# Patient Record
Sex: FEMALE | Race: WHITE | NOT HISPANIC OR LATINO | Employment: FULL TIME | URBAN - METROPOLITAN AREA
[De-identification: names, ages, dates, MRNs, and addresses within clinical notes are randomized per-mention and may not be internally consistent; named-entity substitution may affect disease eponyms.]

---

## 2017-07-03 ENCOUNTER — HOSPITAL ENCOUNTER (OUTPATIENT)
Dept: RADIOLOGY | Facility: OTHER | Age: 57
Discharge: HOME | End: 2017-07-03
Attending: OBSTETRICS & GYNECOLOGY
Payer: COMMERCIAL

## 2017-07-03 VITALS — WEIGHT: 114 LBS | BODY MASS INDEX: 19.46 KG/M2 | HEIGHT: 64 IN

## 2017-07-03 DIAGNOSIS — R92.8 ABNORMAL MAMMOGRAM, UNSPECIFIED: ICD-10-CM

## 2017-07-03 PROCEDURE — 77066 DX MAMMO INCL CAD BI: CPT | Mod: TC

## 2017-07-03 PROCEDURE — 77066 DX MAMMO INCL CAD BI: CPT | Mod: 26,,, | Performed by: RADIOLOGY

## 2017-07-03 PROCEDURE — 77062 BREAST TOMOSYNTHESIS BI: CPT | Mod: 26,,, | Performed by: RADIOLOGY

## 2018-03-13 ENCOUNTER — HOSPITAL ENCOUNTER (OUTPATIENT)
Dept: RADIOLOGY | Facility: OTHER | Age: 58
Discharge: HOME | End: 2018-03-13
Attending: INTERNAL MEDICINE
Payer: COMMERCIAL

## 2018-03-13 DIAGNOSIS — M25.572 LEFT ANKLE PAIN: ICD-10-CM

## 2018-03-13 PROCEDURE — 73600 X-RAY EXAM OF ANKLE: CPT | Mod: 26,50,, | Performed by: RADIOLOGY

## 2018-03-13 PROCEDURE — 73600 X-RAY EXAM OF ANKLE: CPT | Mod: 50,TC,FY

## 2018-09-28 DIAGNOSIS — Z12.39 SCREENING BREAST EXAMINATION: Primary | ICD-10-CM

## 2018-10-09 ENCOUNTER — HOSPITAL ENCOUNTER (OUTPATIENT)
Dept: RADIOLOGY | Facility: OTHER | Age: 58
Discharge: HOME | End: 2018-10-09
Attending: OBSTETRICS & GYNECOLOGY
Payer: COMMERCIAL

## 2018-10-09 VITALS — WEIGHT: 114 LBS | HEIGHT: 64 IN | BODY MASS INDEX: 19.46 KG/M2

## 2018-10-09 DIAGNOSIS — Z12.39 SCREENING BREAST EXAMINATION: ICD-10-CM

## 2018-10-09 PROCEDURE — 77063 BREAST TOMOSYNTHESIS BI: CPT | Mod: 26,,, | Performed by: RADIOLOGY

## 2018-10-09 PROCEDURE — 77067 SCR MAMMO BI INCL CAD: CPT | Mod: TC

## 2018-10-09 PROCEDURE — 77067 SCR MAMMO BI INCL CAD: CPT | Mod: 26,,, | Performed by: RADIOLOGY

## 2018-10-09 PROCEDURE — 77063 BREAST TOMOSYNTHESIS BI: CPT | Mod: TC

## 2021-03-12 ENCOUNTER — LAB VISIT (OUTPATIENT)
Dept: INTERNAL MEDICINE | Facility: CLINIC | Age: 61
End: 2021-03-12
Payer: COMMERCIAL

## 2021-03-12 DIAGNOSIS — Z01.818 PRE-OP TESTING: ICD-10-CM

## 2021-03-12 LAB — SARS-COV-2 RNA RESP QL NAA+PROBE: NOT DETECTED

## 2021-03-12 PROCEDURE — U0005 INFEC AGEN DETEC AMPLI PROBE: HCPCS | Performed by: SPECIALIST

## 2021-03-12 PROCEDURE — U0003 INFECTIOUS AGENT DETECTION BY NUCLEIC ACID (DNA OR RNA); SEVERE ACUTE RESPIRATORY SYNDROME CORONAVIRUS 2 (SARS-COV-2) (CORONAVIRUS DISEASE [COVID-19]), AMPLIFIED PROBE TECHNIQUE, MAKING USE OF HIGH THROUGHPUT TECHNOLOGIES AS DESCRIBED BY CMS-2020-01-R: HCPCS | Performed by: SPECIALIST

## 2021-03-15 ENCOUNTER — ANESTHESIA (OUTPATIENT)
Dept: SURGERY | Facility: OTHER | Age: 61
End: 2021-03-15
Payer: COMMERCIAL

## 2021-03-15 ENCOUNTER — ANESTHESIA EVENT (OUTPATIENT)
Dept: SURGERY | Facility: OTHER | Age: 61
End: 2021-03-15
Payer: COMMERCIAL

## 2021-03-15 ENCOUNTER — HOSPITAL ENCOUNTER (OUTPATIENT)
Facility: OTHER | Age: 61
Discharge: HOME | End: 2021-03-15
Attending: ORTHOPAEDIC SURGERY | Admitting: ORTHOPAEDIC SURGERY
Payer: COMMERCIAL

## 2021-03-15 VITALS
OXYGEN SATURATION: 100 % | SYSTOLIC BLOOD PRESSURE: 137 MMHG | WEIGHT: 118 LBS | HEART RATE: 61 BPM | BODY MASS INDEX: 19.66 KG/M2 | DIASTOLIC BLOOD PRESSURE: 66 MMHG | HEIGHT: 65 IN | TEMPERATURE: 97 F | RESPIRATION RATE: 18 BRPM

## 2021-03-15 DIAGNOSIS — M65.311 TRIGGER THUMB OF RIGHT HAND: Primary | ICD-10-CM

## 2021-03-15 DIAGNOSIS — Z01.818 PRE-OP TESTING: ICD-10-CM

## 2021-03-15 PROCEDURE — 36000706: Performed by: ORTHOPAEDIC SURGERY

## 2021-03-15 PROCEDURE — 36000707: Performed by: ORTHOPAEDIC SURGERY

## 2021-03-15 PROCEDURE — 63600175 PHARM REV CODE 636 W HCPCS: Performed by: NURSE ANESTHETIST, CERTIFIED REGISTERED

## 2021-03-15 PROCEDURE — 25000003 PHARM REV CODE 250: Performed by: NURSE ANESTHETIST, CERTIFIED REGISTERED

## 2021-03-15 PROCEDURE — 37000008 HC ANESTHESIA 1ST 15 MINUTES: Performed by: ORTHOPAEDIC SURGERY

## 2021-03-15 PROCEDURE — 71000016 HC POSTOP RECOV ADDL HR: Performed by: ORTHOPAEDIC SURGERY

## 2021-03-15 PROCEDURE — 25000003 PHARM REV CODE 250: Performed by: ORTHOPAEDIC SURGERY

## 2021-03-15 PROCEDURE — 71000015 HC POSTOP RECOV 1ST HR: Performed by: ORTHOPAEDIC SURGERY

## 2021-03-15 PROCEDURE — 63600175 PHARM REV CODE 636 W HCPCS: Performed by: ORTHOPAEDIC SURGERY

## 2021-03-15 PROCEDURE — 37000009 HC ANESTHESIA EA ADD 15 MINS: Performed by: ORTHOPAEDIC SURGERY

## 2021-03-15 RX ORDER — NAPROXEN SODIUM 220 MG
220 TABLET ORAL 2 TIMES DAILY WITH MEALS
Status: ON HOLD | COMMUNITY
Start: 2021-03-15 | End: 2022-12-28 | Stop reason: HOSPADM

## 2021-03-15 RX ORDER — OXYCODONE HYDROCHLORIDE 5 MG/1
5 TABLET ORAL
Status: CANCELLED | OUTPATIENT
Start: 2021-03-15

## 2021-03-15 RX ORDER — CEFAZOLIN SODIUM 1 G/3ML
2 INJECTION, POWDER, FOR SOLUTION INTRAMUSCULAR; INTRAVENOUS
Status: COMPLETED | OUTPATIENT
Start: 2021-03-15 | End: 2021-03-15

## 2021-03-15 RX ORDER — MIDAZOLAM HYDROCHLORIDE 1 MG/ML
INJECTION INTRAMUSCULAR; INTRAVENOUS
Status: DISCONTINUED | OUTPATIENT
Start: 2021-03-15 | End: 2021-03-15

## 2021-03-15 RX ORDER — HYDROMORPHONE HYDROCHLORIDE 2 MG/ML
0.4 INJECTION, SOLUTION INTRAMUSCULAR; INTRAVENOUS; SUBCUTANEOUS EVERY 5 MIN PRN
Status: CANCELLED | OUTPATIENT
Start: 2021-03-15

## 2021-03-15 RX ORDER — DIPHENHYDRAMINE HYDROCHLORIDE 50 MG/ML
25 INJECTION INTRAMUSCULAR; INTRAVENOUS EVERY 6 HOURS PRN
Status: CANCELLED | OUTPATIENT
Start: 2021-03-15

## 2021-03-15 RX ORDER — PROPOFOL 10 MG/ML
VIAL (ML) INTRAVENOUS
Status: DISCONTINUED | OUTPATIENT
Start: 2021-03-15 | End: 2021-03-15

## 2021-03-15 RX ORDER — LIDOCAINE HYDROCHLORIDE 10 MG/ML
INJECTION, SOLUTION EPIDURAL; INFILTRATION; INTRACAUDAL; PERINEURAL
Status: DISCONTINUED | OUTPATIENT
Start: 2021-03-15 | End: 2021-03-15 | Stop reason: HOSPADM

## 2021-03-15 RX ORDER — ONDANSETRON 2 MG/ML
4 INJECTION INTRAMUSCULAR; INTRAVENOUS DAILY PRN
Status: CANCELLED | OUTPATIENT
Start: 2021-03-15

## 2021-03-15 RX ORDER — MEPERIDINE HYDROCHLORIDE 25 MG/ML
12.5 INJECTION INTRAMUSCULAR; INTRAVENOUS; SUBCUTANEOUS ONCE AS NEEDED
Status: CANCELLED | OUTPATIENT
Start: 2021-03-15 | End: 2021-03-16

## 2021-03-15 RX ORDER — SODIUM CHLORIDE 0.9 % (FLUSH) 0.9 %
3 SYRINGE (ML) INJECTION
Status: DISCONTINUED | OUTPATIENT
Start: 2021-03-15 | End: 2021-03-15 | Stop reason: HOSPADM

## 2021-03-15 RX ORDER — LIDOCAINE HYDROCHLORIDE 20 MG/ML
INJECTION INTRAVENOUS
Status: DISCONTINUED | OUTPATIENT
Start: 2021-03-15 | End: 2021-03-15

## 2021-03-15 RX ORDER — DEXTROMETHORPHAN HYDROBROMIDE, GUAIFENESIN 5; 100 MG/5ML; MG/5ML
650 LIQUID ORAL EVERY 8 HOURS
Qty: 60 TABLET | Refills: 0 | Status: ON HOLD | OUTPATIENT
Start: 2021-03-15 | End: 2022-12-27

## 2021-03-15 RX ORDER — FENTANYL CITRATE 50 UG/ML
INJECTION, SOLUTION INTRAMUSCULAR; INTRAVENOUS
Status: DISCONTINUED | OUTPATIENT
Start: 2021-03-15 | End: 2021-03-15

## 2021-03-15 RX ORDER — PROPOFOL 10 MG/ML
VIAL (ML) INTRAVENOUS CONTINUOUS PRN
Status: DISCONTINUED | OUTPATIENT
Start: 2021-03-15 | End: 2021-03-15

## 2021-03-15 RX ORDER — OXYCODONE HYDROCHLORIDE 5 MG/1
TABLET ORAL
Qty: 10 TABLET | Refills: 0 | Status: ON HOLD | OUTPATIENT
Start: 2021-03-15 | End: 2022-12-27

## 2021-03-15 RX ADMIN — CEFAZOLIN 2 G: 330 INJECTION, POWDER, FOR SOLUTION INTRAMUSCULAR; INTRAVENOUS at 07:03

## 2021-03-15 RX ADMIN — PROPOFOL 40 MG: 10 INJECTION, EMULSION INTRAVENOUS at 07:03

## 2021-03-15 RX ADMIN — LIDOCAINE HYDROCHLORIDE 40 MG: 20 INJECTION, SOLUTION INTRAVENOUS at 07:03

## 2021-03-15 RX ADMIN — PROPOFOL 30 MG: 10 INJECTION, EMULSION INTRAVENOUS at 08:03

## 2021-03-15 RX ADMIN — FENTANYL CITRATE 25 MCG: 50 INJECTION, SOLUTION INTRAMUSCULAR; INTRAVENOUS at 08:03

## 2021-03-15 RX ADMIN — HYDROCORTISONE SODIUM SUCCINATE 100 MG: 100 INJECTION, POWDER, FOR SOLUTION INTRAMUSCULAR; INTRAVENOUS at 07:03

## 2021-03-15 RX ADMIN — MIDAZOLAM HYDROCHLORIDE 2 MG: 1 INJECTION, SOLUTION INTRAMUSCULAR; INTRAVENOUS at 07:03

## 2021-03-15 RX ADMIN — PROPOFOL 50 MCG/KG/MIN: 10 INJECTION, EMULSION INTRAVENOUS at 07:03

## 2021-03-29 ENCOUNTER — IMMUNIZATION (OUTPATIENT)
Dept: PRIMARY CARE CLINIC | Facility: CLINIC | Age: 61
End: 2021-03-29
Payer: COMMERCIAL

## 2021-03-29 DIAGNOSIS — Z23 NEED FOR VACCINATION: Primary | ICD-10-CM

## 2021-03-29 PROCEDURE — 91301 PR SARS-COV-2 COVID-19 VACCINE, NO PRSV, 100MCG/0.5ML, IM: ICD-10-PCS | Mod: S$GLB,,, | Performed by: INTERNAL MEDICINE

## 2021-03-29 PROCEDURE — 0011A PR IMMUNIZ ADMIN, SARS-COV-2 COVID-19 VACC, 100MCG/0.5ML, 1ST DOSE: ICD-10-PCS | Mod: CV19,S$GLB,, | Performed by: INTERNAL MEDICINE

## 2021-03-29 PROCEDURE — 91301 PR SARS-COV-2 COVID-19 VACCINE, NO PRSV, 100MCG/0.5ML, IM: CPT | Mod: S$GLB,,, | Performed by: INTERNAL MEDICINE

## 2021-03-29 PROCEDURE — 0011A PR IMMUNIZ ADMIN, SARS-COV-2 COVID-19 VACC, 100MCG/0.5ML, 1ST DOSE: CPT | Mod: CV19,S$GLB,, | Performed by: INTERNAL MEDICINE

## 2021-03-29 RX ADMIN — Medication 0.5 ML: at 05:03

## 2021-04-28 ENCOUNTER — IMMUNIZATION (OUTPATIENT)
Dept: PRIMARY CARE CLINIC | Facility: CLINIC | Age: 61
End: 2021-04-28
Payer: COMMERCIAL

## 2021-04-28 DIAGNOSIS — Z23 NEED FOR VACCINATION: Primary | ICD-10-CM

## 2021-04-28 PROCEDURE — 0012A PR IMMUNIZ ADMIN, SARS-COV-2 COVID-19 VACC, 100MCG/0.5ML, 2ND DOSE: ICD-10-PCS | Mod: CV19,S$GLB,, | Performed by: INTERNAL MEDICINE

## 2021-04-28 PROCEDURE — 91301 PR SARS-COV-2 COVID-19 VACCINE, NO PRSV, 100MCG/0.5ML, IM: CPT | Mod: S$GLB,,, | Performed by: INTERNAL MEDICINE

## 2021-04-28 PROCEDURE — 91301 PR SARS-COV-2 COVID-19 VACCINE, NO PRSV, 100MCG/0.5ML, IM: ICD-10-PCS | Mod: S$GLB,,, | Performed by: INTERNAL MEDICINE

## 2021-04-28 PROCEDURE — 0012A PR IMMUNIZ ADMIN, SARS-COV-2 COVID-19 VACC, 100MCG/0.5ML, 2ND DOSE: CPT | Mod: CV19,S$GLB,, | Performed by: INTERNAL MEDICINE

## 2021-04-28 RX ADMIN — Medication 0.5 ML: at 07:04

## 2022-01-02 ENCOUNTER — IMMUNIZATION (OUTPATIENT)
Dept: PRIMARY CARE CLINIC | Facility: CLINIC | Age: 62
End: 2022-01-02
Payer: COMMERCIAL

## 2022-01-02 DIAGNOSIS — Z23 NEED FOR VACCINATION: Primary | ICD-10-CM

## 2022-01-02 PROCEDURE — 0064A COVID-19, MRNA, LNP-S, PF, 100 MCG/0.25 ML DOSE VACCINE (MODERNA BOOSTER): CPT | Mod: CV19,PBBFAC | Performed by: INTERNAL MEDICINE

## 2022-12-25 ENCOUNTER — HOSPITAL ENCOUNTER (INPATIENT)
Facility: HOSPITAL | Age: 62
LOS: 3 days | Discharge: HOME | DRG: 270 | End: 2022-12-28
Attending: EMERGENCY MEDICINE | Admitting: INTERNAL MEDICINE
Payer: COMMERCIAL

## 2022-12-25 DIAGNOSIS — R79.89 ELEVATED TROPONIN: ICD-10-CM

## 2022-12-25 DIAGNOSIS — I50.20 HFREF (HEART FAILURE WITH REDUCED EJECTION FRACTION): ICD-10-CM

## 2022-12-25 DIAGNOSIS — I21.3 STEMI (ST ELEVATION MYOCARDIAL INFARCTION): ICD-10-CM

## 2022-12-25 DIAGNOSIS — R07.9 CHEST PAIN: ICD-10-CM

## 2022-12-25 DIAGNOSIS — I21.3 ST ELEVATION MYOCARDIAL INFARCTION (STEMI), UNSPECIFIED ARTERY: Primary | ICD-10-CM

## 2022-12-25 DIAGNOSIS — I21.11 ST ELEVATION MYOCARDIAL INFARCTION INVOLVING RIGHT CORONARY ARTERY: ICD-10-CM

## 2022-12-25 PROBLEM — Z98.890 ON INTRA-AORTIC BALLOON PUMP ASSIST: Status: ACTIVE | Noted: 2022-12-25

## 2022-12-25 LAB
ABO + RH BLD: NORMAL
ALBUMIN SERPL BCP-MCNC: 3.8 G/DL (ref 3.5–5.2)
ALP SERPL-CCNC: 66 U/L (ref 55–135)
ALT SERPL W/O P-5'-P-CCNC: 21 U/L (ref 10–44)
ANION GAP SERPL CALC-SCNC: 11 MMOL/L (ref 8–16)
AST SERPL-CCNC: 44 U/L (ref 10–40)
BASOPHILS # BLD AUTO: 0.05 K/UL (ref 0–0.2)
BASOPHILS NFR BLD: 0.5 % (ref 0–1.9)
BILIRUB SERPL-MCNC: 0.7 MG/DL (ref 0.1–1)
BLD GP AB SCN CELLS X3 SERPL QL: NORMAL
BUN SERPL-MCNC: 19 MG/DL (ref 8–23)
CALCIUM SERPL-MCNC: 9.6 MG/DL (ref 8.7–10.5)
CHLORIDE SERPL-SCNC: 103 MMOL/L (ref 95–110)
CO2 SERPL-SCNC: 23 MMOL/L (ref 23–29)
CREAT SERPL-MCNC: 1 MG/DL (ref 0.5–1.4)
DIFFERENTIAL METHOD: ABNORMAL
EOSINOPHIL # BLD AUTO: 0.1 K/UL (ref 0–0.5)
EOSINOPHIL NFR BLD: 1 % (ref 0–8)
ERYTHROCYTE [DISTWIDTH] IN BLOOD BY AUTOMATED COUNT: 11.8 % (ref 11.5–14.5)
EST. GFR  (NO RACE VARIABLE): >60 ML/MIN/1.73 M^2
GLUCOSE SERPL-MCNC: 181 MG/DL (ref 70–110)
HCT VFR BLD AUTO: 45.7 % (ref 37–48.5)
HGB BLD-MCNC: 15.7 G/DL (ref 12–16)
IMM GRANULOCYTES # BLD AUTO: 0.02 K/UL (ref 0–0.04)
IMM GRANULOCYTES NFR BLD AUTO: 0.2 % (ref 0–0.5)
LYMPHOCYTES # BLD AUTO: 2.7 K/UL (ref 1–4.8)
LYMPHOCYTES NFR BLD: 29.6 % (ref 18–48)
MCH RBC QN AUTO: 33.3 PG (ref 27–31)
MCHC RBC AUTO-ENTMCNC: 34.4 G/DL (ref 32–36)
MCV RBC AUTO: 97 FL (ref 82–98)
MONOCYTES # BLD AUTO: 0.6 K/UL (ref 0.3–1)
MONOCYTES NFR BLD: 6.4 % (ref 4–15)
NEUTROPHILS # BLD AUTO: 5.7 K/UL (ref 1.8–7.7)
NEUTROPHILS NFR BLD: 62.3 % (ref 38–73)
NRBC BLD-RTO: 0 /100 WBC
PLATELET # BLD AUTO: 156 K/UL (ref 150–450)
PMV BLD AUTO: 10.4 FL (ref 9.2–12.9)
POCT GLUCOSE: 107 MG/DL (ref 70–110)
POCT GLUCOSE: 93 MG/DL (ref 70–110)
POTASSIUM SERPL-SCNC: 4.3 MMOL/L (ref 3.5–5.1)
PROT SERPL-MCNC: 6.4 G/DL (ref 6–8.4)
RBC # BLD AUTO: 4.71 M/UL (ref 4–5.4)
SODIUM SERPL-SCNC: 137 MMOL/L (ref 136–145)
TROPONIN I SERPL DL<=0.01 NG/ML-MCNC: 1.73 NG/ML (ref 0–0.03)
TROPONIN I SERPL DL<=0.01 NG/ML-MCNC: 14.59 NG/ML (ref 0–0.03)
TROPONIN I SERPL DL<=0.01 NG/ML-MCNC: >50 NG/ML (ref 0–0.03)
WBC # BLD AUTO: 9.22 K/UL (ref 3.9–12.7)

## 2022-12-25 PROCEDURE — 84484 ASSAY OF TROPONIN QUANT: CPT | Performed by: STUDENT IN AN ORGANIZED HEALTH CARE EDUCATION/TRAINING PROGRAM

## 2022-12-25 PROCEDURE — 99152 MOD SED SAME PHYS/QHP 5/>YRS: CPT | Mod: ,,, | Performed by: INTERNAL MEDICINE

## 2022-12-25 PROCEDURE — 93308 PR ECHO HEART XTHORACIC,LIMITED: ICD-10-PCS | Mod: 26,,, | Performed by: EMERGENCY MEDICINE

## 2022-12-25 PROCEDURE — 99285 EMERGENCY DEPT VISIT HI MDM: CPT | Mod: 25,,, | Performed by: EMERGENCY MEDICINE

## 2022-12-25 PROCEDURE — 33967 INSERT I-AORT PERCUT DEVICE: CPT | Performed by: INTERNAL MEDICINE

## 2022-12-25 PROCEDURE — 99152 PR MOD CONSCIOUS SEDATION, SAME PHYS, 5+ YRS, FIRST 15 MIN: ICD-10-PCS | Mod: ,,, | Performed by: INTERNAL MEDICINE

## 2022-12-25 PROCEDURE — 99291 PR CRITICAL CARE, E/M 30-74 MINUTES: ICD-10-PCS | Mod: ,,, | Performed by: INTERNAL MEDICINE

## 2022-12-25 PROCEDURE — 96374 THER/PROPH/DIAG INJ IV PUSH: CPT

## 2022-12-25 PROCEDURE — C1894 INTRO/SHEATH, NON-LASER: HCPCS | Performed by: INTERNAL MEDICINE

## 2022-12-25 PROCEDURE — 93010 ELECTROCARDIOGRAM REPORT: CPT | Mod: 76,,, | Performed by: INTERNAL MEDICINE

## 2022-12-25 PROCEDURE — 27201423 OPTIME MED/SURG SUP & DEVICES STERILE SUPPLY: Performed by: INTERNAL MEDICINE

## 2022-12-25 PROCEDURE — 63600175 PHARM REV CODE 636 W HCPCS: Mod: JG | Performed by: STUDENT IN AN ORGANIZED HEALTH CARE EDUCATION/TRAINING PROGRAM

## 2022-12-25 PROCEDURE — 51701 INSERT BLADDER CATHETER: CPT

## 2022-12-25 PROCEDURE — 25000003 PHARM REV CODE 250: Performed by: STUDENT IN AN ORGANIZED HEALTH CARE EDUCATION/TRAINING PROGRAM

## 2022-12-25 PROCEDURE — C9606 PERC D-E COR REVASC W AMI S: HCPCS | Mod: RC | Performed by: INTERNAL MEDICINE

## 2022-12-25 PROCEDURE — 93454 PR CATH PLACE/CORONARY ANGIO, IMG SUPER/INTERP: ICD-10-PCS | Mod: 26,59,51, | Performed by: INTERNAL MEDICINE

## 2022-12-25 PROCEDURE — 92941 PRQ TRLML REVSC TOT OCCL AMI: CPT | Mod: RC,,, | Performed by: INTERNAL MEDICINE

## 2022-12-25 PROCEDURE — 99285 PR EMERGENCY DEPT VISIT,LEVEL V: ICD-10-PCS | Mod: 25,,, | Performed by: EMERGENCY MEDICINE

## 2022-12-25 PROCEDURE — 25000003 PHARM REV CODE 250

## 2022-12-25 PROCEDURE — 99153 MOD SED SAME PHYS/QHP EA: CPT | Performed by: INTERNAL MEDICINE

## 2022-12-25 PROCEDURE — 33967 PR IABP INSERTION: ICD-10-PCS | Mod: 51,,, | Performed by: INTERNAL MEDICINE

## 2022-12-25 PROCEDURE — 86900 BLOOD TYPING SEROLOGIC ABO: CPT | Performed by: EMERGENCY MEDICINE

## 2022-12-25 PROCEDURE — 25500020 PHARM REV CODE 255: Performed by: INTERNAL MEDICINE

## 2022-12-25 PROCEDURE — C1769 GUIDE WIRE: HCPCS | Performed by: INTERNAL MEDICINE

## 2022-12-25 PROCEDURE — 33967 INSERT I-AORT PERCUT DEVICE: CPT | Mod: 51,,, | Performed by: INTERNAL MEDICINE

## 2022-12-25 PROCEDURE — 93005 ELECTROCARDIOGRAM TRACING: CPT

## 2022-12-25 PROCEDURE — C1760 CLOSURE DEV, VASC: HCPCS | Performed by: INTERNAL MEDICINE

## 2022-12-25 PROCEDURE — 99291 CRITICAL CARE FIRST HOUR: CPT | Mod: ,,, | Performed by: INTERNAL MEDICINE

## 2022-12-25 PROCEDURE — 93454 CORONARY ARTERY ANGIO S&I: CPT | Mod: 26,59,51, | Performed by: INTERNAL MEDICINE

## 2022-12-25 PROCEDURE — 92941 PR AMI ANY METHOD: ICD-10-PCS | Mod: RC,,, | Performed by: INTERNAL MEDICINE

## 2022-12-25 PROCEDURE — 93010 EKG 12-LEAD: ICD-10-PCS | Mod: ,,, | Performed by: INTERNAL MEDICINE

## 2022-12-25 PROCEDURE — 27100094 HC IABP, SET-UP

## 2022-12-25 PROCEDURE — 25000003 PHARM REV CODE 250: Performed by: INTERNAL MEDICINE

## 2022-12-25 PROCEDURE — 84484 ASSAY OF TROPONIN QUANT: CPT | Mod: 91 | Performed by: EMERGENCY MEDICINE

## 2022-12-25 PROCEDURE — 63600175 PHARM REV CODE 636 W HCPCS: Performed by: INTERNAL MEDICINE

## 2022-12-25 PROCEDURE — 20000000 HC ICU ROOM

## 2022-12-25 PROCEDURE — 99285 EMERGENCY DEPT VISIT HI MDM: CPT | Mod: 25

## 2022-12-25 PROCEDURE — 93010 EKG 12-LEAD: ICD-10-PCS | Mod: 76,,, | Performed by: INTERNAL MEDICINE

## 2022-12-25 PROCEDURE — C1725 CATH, TRANSLUMIN NON-LASER: HCPCS | Performed by: INTERNAL MEDICINE

## 2022-12-25 PROCEDURE — C2623 CATH, TRANSLUMIN, DRUG-COAT: HCPCS | Performed by: INTERNAL MEDICINE

## 2022-12-25 PROCEDURE — 93454 CORONARY ARTERY ANGIO S&I: CPT | Mod: 59 | Performed by: INTERNAL MEDICINE

## 2022-12-25 PROCEDURE — 51798 US URINE CAPACITY MEASURE: CPT

## 2022-12-25 PROCEDURE — 99499 UNLISTED E&M SERVICE: CPT | Mod: ,,, | Performed by: INTERNAL MEDICINE

## 2022-12-25 PROCEDURE — 25000003 PHARM REV CODE 250: Performed by: EMERGENCY MEDICINE

## 2022-12-25 PROCEDURE — 81003 URINALYSIS AUTO W/O SCOPE: CPT | Performed by: EMERGENCY MEDICINE

## 2022-12-25 PROCEDURE — 99499 NO LOS: ICD-10-PCS | Mod: ,,, | Performed by: INTERNAL MEDICINE

## 2022-12-25 PROCEDURE — 93010 ELECTROCARDIOGRAM REPORT: CPT | Mod: ,,, | Performed by: INTERNAL MEDICINE

## 2022-12-25 PROCEDURE — 99152 MOD SED SAME PHYS/QHP 5/>YRS: CPT | Performed by: INTERNAL MEDICINE

## 2022-12-25 PROCEDURE — 93308 TTE F-UP OR LMTD: CPT | Mod: 26,,, | Performed by: EMERGENCY MEDICINE

## 2022-12-25 PROCEDURE — 80053 COMPREHEN METABOLIC PANEL: CPT | Performed by: EMERGENCY MEDICINE

## 2022-12-25 PROCEDURE — 85025 COMPLETE CBC W/AUTO DIFF WBC: CPT | Performed by: EMERGENCY MEDICINE

## 2022-12-25 PROCEDURE — C1887 CATHETER, GUIDING: HCPCS | Performed by: INTERNAL MEDICINE

## 2022-12-25 PROCEDURE — 63600175 PHARM REV CODE 636 W HCPCS: Performed by: EMERGENCY MEDICINE

## 2022-12-25 DEVICE — STENT ONYXNG40018UX ONYX 4.00X18RX
Type: IMPLANTABLE DEVICE | Site: HEART | Status: FUNCTIONAL
Brand: ONYX FRONTIER™

## 2022-12-25 DEVICE — STENT ONYXNG35008UX ONYX 3.50X08RX
Type: IMPLANTABLE DEVICE | Site: HEART | Status: FUNCTIONAL
Brand: ONYX FRONTIER™

## 2022-12-25 RX ORDER — IBUPROFEN 200 MG
16 TABLET ORAL
Status: DISCONTINUED | OUTPATIENT
Start: 2022-12-25 | End: 2022-12-28 | Stop reason: HOSPADM

## 2022-12-25 RX ORDER — TIROFIBAN HYDROCHLORIDE 50 UG/ML
0.07 INJECTION INTRAVENOUS CONTINUOUS
Status: DISPENSED | OUTPATIENT
Start: 2022-12-25 | End: 2022-12-26

## 2022-12-25 RX ORDER — CEFAZOLIN SODIUM 1 G/3ML
INJECTION, POWDER, FOR SOLUTION INTRAMUSCULAR; INTRAVENOUS
Status: DISCONTINUED | OUTPATIENT
Start: 2022-12-25 | End: 2022-12-25 | Stop reason: HOSPADM

## 2022-12-25 RX ORDER — SODIUM CHLORIDE 9 MG/ML
INJECTION, SOLUTION INTRAVENOUS CONTINUOUS
Status: CANCELLED | OUTPATIENT
Start: 2022-12-25 | End: 2022-12-25

## 2022-12-25 RX ORDER — LIDOCAINE HYDROCHLORIDE 10 MG/ML
INJECTION INFILTRATION; PERINEURAL
Status: DISCONTINUED | OUTPATIENT
Start: 2022-12-25 | End: 2022-12-25 | Stop reason: HOSPADM

## 2022-12-25 RX ORDER — MIDAZOLAM HYDROCHLORIDE 1 MG/ML
INJECTION INTRAMUSCULAR; INTRAVENOUS
Status: DISCONTINUED | OUTPATIENT
Start: 2022-12-25 | End: 2022-12-25 | Stop reason: HOSPADM

## 2022-12-25 RX ORDER — IBUPROFEN 200 MG
24 TABLET ORAL
Status: DISCONTINUED | OUTPATIENT
Start: 2022-12-25 | End: 2022-12-28 | Stop reason: HOSPADM

## 2022-12-25 RX ORDER — HEPARIN SODIUM 1000 [USP'U]/ML
INJECTION, SOLUTION INTRAVENOUS; SUBCUTANEOUS
Status: DISCONTINUED | OUTPATIENT
Start: 2022-12-25 | End: 2022-12-25 | Stop reason: HOSPADM

## 2022-12-25 RX ORDER — GLUCAGON 1 MG
1 KIT INJECTION
Status: DISCONTINUED | OUTPATIENT
Start: 2022-12-25 | End: 2022-12-28 | Stop reason: HOSPADM

## 2022-12-25 RX ORDER — HEPARIN SODIUM 5000 [USP'U]/ML
5000 INJECTION, SOLUTION INTRAVENOUS; SUBCUTANEOUS ONCE
Status: COMPLETED | OUTPATIENT
Start: 2022-12-25 | End: 2022-12-25

## 2022-12-25 RX ORDER — ASPIRIN 325 MG
TABLET ORAL
Status: DISPENSED
Start: 2022-12-25 | End: 2022-12-25

## 2022-12-25 RX ORDER — NITROGLYCERIN 0.4 MG/1
TABLET SUBLINGUAL
Status: DISPENSED
Start: 2022-12-25 | End: 2022-12-25

## 2022-12-25 RX ORDER — ATORVASTATIN CALCIUM 20 MG/1
80 TABLET, FILM COATED ORAL DAILY
Status: DISCONTINUED | OUTPATIENT
Start: 2022-12-25 | End: 2022-12-28 | Stop reason: HOSPADM

## 2022-12-25 RX ORDER — DIPHENHYDRAMINE HCL 50 MG
CAPSULE ORAL
Status: DISCONTINUED
Start: 2022-12-25 | End: 2022-12-25 | Stop reason: WASHOUT

## 2022-12-25 RX ORDER — ACETAMINOPHEN 325 MG/1
650 TABLET ORAL EVERY 6 HOURS PRN
Status: DISCONTINUED | OUTPATIENT
Start: 2022-12-25 | End: 2022-12-28 | Stop reason: HOSPADM

## 2022-12-25 RX ORDER — SODIUM CHLORIDE 0.9 % (FLUSH) 0.9 %
10 SYRINGE (ML) INJECTION
Status: DISCONTINUED | OUTPATIENT
Start: 2022-12-25 | End: 2022-12-28 | Stop reason: HOSPADM

## 2022-12-25 RX ORDER — ASPIRIN 325 MG
325 TABLET ORAL
Status: COMPLETED | OUTPATIENT
Start: 2022-12-25 | End: 2022-12-25

## 2022-12-25 RX ORDER — INSULIN ASPART 100 [IU]/ML
0-5 INJECTION, SOLUTION INTRAVENOUS; SUBCUTANEOUS
Status: DISCONTINUED | OUTPATIENT
Start: 2022-12-25 | End: 2022-12-28 | Stop reason: HOSPADM

## 2022-12-25 RX ORDER — FENTANYL CITRATE 50 UG/ML
INJECTION, SOLUTION INTRAMUSCULAR; INTRAVENOUS
Status: DISCONTINUED | OUTPATIENT
Start: 2022-12-25 | End: 2022-12-25 | Stop reason: HOSPADM

## 2022-12-25 RX ORDER — CLOPIDOGREL 300 MG/1
TABLET, FILM COATED ORAL
Status: DISPENSED
Start: 2022-12-25 | End: 2022-12-25

## 2022-12-25 RX ORDER — NAPROXEN SODIUM 220 MG/1
81 TABLET, FILM COATED ORAL DAILY
Status: DISCONTINUED | OUTPATIENT
Start: 2022-12-26 | End: 2022-12-28 | Stop reason: HOSPADM

## 2022-12-25 RX ORDER — HEPARIN SOD,PORCINE/0.9 % NACL 1000/500ML
INTRAVENOUS SOLUTION INTRAVENOUS
Status: DISCONTINUED | OUTPATIENT
Start: 2022-12-25 | End: 2022-12-25 | Stop reason: HOSPADM

## 2022-12-25 RX ORDER — DIPHENHYDRAMINE HCL 50 MG
50 CAPSULE ORAL ONCE
Status: CANCELLED | OUTPATIENT
Start: 2022-12-25 | End: 2022-12-25

## 2022-12-25 RX ORDER — SODIUM CHLORIDE 9 MG/ML
INJECTION, SOLUTION INTRAVENOUS CONTINUOUS
Status: ACTIVE | OUTPATIENT
Start: 2022-12-25 | End: 2022-12-25

## 2022-12-25 RX ORDER — MORPHINE SULFATE 2 MG/ML
2 INJECTION, SOLUTION INTRAMUSCULAR; INTRAVENOUS
Status: COMPLETED | OUTPATIENT
Start: 2022-12-25 | End: 2022-12-25

## 2022-12-25 RX ADMIN — ASPIRIN 325 MG: 325 TABLET ORAL at 11:12

## 2022-12-25 RX ADMIN — MORPHINE SULFATE 2 MG: 2 INJECTION, SOLUTION INTRAMUSCULAR; INTRAVENOUS at 11:12

## 2022-12-25 RX ADMIN — TICAGRELOR 90 MG: 90 TABLET ORAL at 08:12

## 2022-12-25 RX ADMIN — SODIUM CHLORIDE 500 ML: 0.9 INJECTION, SOLUTION INTRAVENOUS at 11:12

## 2022-12-25 RX ADMIN — HEPARIN SODIUM 5000 UNITS: 5000 INJECTION INTRAVENOUS; SUBCUTANEOUS at 11:12

## 2022-12-25 RX ADMIN — SODIUM CHLORIDE: 9 INJECTION, SOLUTION INTRAVENOUS at 01:12

## 2022-12-25 RX ADMIN — TICAGRELOR 180 MG: 90 TABLET ORAL at 11:12

## 2022-12-25 RX ADMIN — ATORVASTATIN CALCIUM 80 MG: 40 TABLET, FILM COATED ORAL at 03:12

## 2022-12-25 RX ADMIN — TIROFIBAN 0.07 MCG/KG/MIN: 5 INJECTION, SOLUTION INTRAVENOUS at 03:12

## 2022-12-25 NOTE — ASSESSMENT & PLAN NOTE
IABP left in place following LHC for assist.     Plan  - Daily CXR   - Daily coags   - Trend daily LDH

## 2022-12-25 NOTE — H&P
[facility]  Cardiology  History and Physical     Patient Name: Nai Boo  MRN: [id number]  Admission Date: 2022  Code Status: No Order   Attending Provider: No att. providers found   Primary Care Physician: Jaycee Barriga MD  Principal Problem:<principal problem not specified>    Patient information was obtained from patient and ER records.     Subjective:     Chief Complaint:  Chest discomfort     HPI:  Ms. Nai Boo is a 63 yo F with no known medical history presenting with crushing chest pain with radiation to upper arms. No other associated symptoms. History abbreviated given acuity of presentation. She was in normal state of health with some chest discomfort yesterday that self resolved. Today, symptoms were more severe and persistent. Symptoms started around 10-10:30am morning of admission. She was brought to ER by her .    In the ED, patient found to be in significant distress but hemodynamically stable. ECG showed inferior and posterior stemi findings. She was given morpine. Bedside echo showed infero septal hypokinesis with a quick picture. Code stemi called. SHe was loaded with ASA, brilinta, given 5k of heparin. Pt rolled to cath lab emergently after procedure discussed and consents obtained by patient and her .      Past Medical History:   Diagnosis Date    Hypertension     Trigger finger        Past Surgical History:   Procedure Laterality Date    AMPUTATION Left     finger amputation x2    BREAST BIOPSY Left 2016    benign     SECTION      EYE SURGERY      cataracts    SINUS SURGERY      TRIGGER FINGER RELEASE Right 3/15/2021    Procedure: RELEASE, TRIGGER THUMB;  Surgeon: Ailyn Renee MD;  Location: [facility];  Service: Orthopedics;  Laterality: Right;  rt thumb       Review of patient's allergies indicates:   Allergen Reactions    Sulfa (sulfonamide antibiotics) Swelling       No current facility-administered medications on file  prior to encounter.     Current Outpatient Medications on File Prior to Encounter   Medication Sig    acetaminophen (TYLENOL) 650 MG TbSR Take 1 tablet (650 mg total) by mouth every 8 (eight) hours. For three days then as needed    carboxymethylcellulose sodium (THERATEARS OPHT) Apply to eye.    multivitamin capsule Take 1 capsule by mouth once daily.    naproxen sodium (ALEVE) 220 MG tablet Take 1 tablet (220 mg total) by mouth 2 (two) times daily with meals. For 3 days then prn    oxyCODONE (ROXICODONE) 5 MG immediate release tablet Take 1 to 2 tablets by mouth every 4 hours as needed for pain     Family History    None       Tobacco Use    Smoking status: Never    Smokeless tobacco: Not on file   Substance and Sexual Activity    Alcohol use: Yes     Comment: occasinal     Drug use: Not on file    Sexual activity: Not on file     Review of Systems   Reason unable to perform ROS: negative aside from what is mentioned in hpi.   All other systems reviewed and are negative.  Objective:     Vital Signs (Most Recent):  Temp: 97.5 °F (36.4 °C) (12/25/22 1132)  Pulse: 62 (12/25/22 1138)  Resp: 20 (12/25/22 1138)  BP: (!) 150/71 (12/25/22 1138)  SpO2: 100 % (12/25/22 1138)   Vital Signs (24h Range):  Temp:  [97.5 °F (36.4 °C)] 97.5 °F (36.4 °C)  Pulse:  [61-77] 62  Resp:  [20-24] 20  SpO2:  [92 %-100 %] 100 %  BP: (115-152)/() 150/71     Weight: 51.3 kg (113 lb)  Body mass index is 18.8 kg/m².    SpO2: 100 %       No intake or output data in the 24 hours ending 12/25/22 1150    Lines/Drains/Airways       Peripheral Intravenous Line  Duration                  Peripheral IV - Single Lumen 03/15/21 0639 20 G Left Hand 650 days         Peripheral IV - Single Lumen 12/25/22 1118 18 G Right Antecubital <1 day         Peripheral IV - Single Lumen 12/25/22 1119 20 G Left Antecubital <1 day                    Physical Exam  Constitutional:       Appearance: She is well-developed.   HENT:      Head: Normocephalic and  atraumatic.      Right Ear: External ear normal.      Left Ear: External ear normal.   Eyes:      Conjunctiva/sclera: Conjunctivae normal.   Cardiovascular:      Rate and Rhythm: Normal rate and regular rhythm.      Pulses: Intact distal pulses.           Radial pulses are 2+ on the right side and 2+ on the left side.      Heart sounds: Normal heart sounds.   Pulmonary:      Effort: Pulmonary effort is normal. No respiratory distress.      Breath sounds: Normal breath sounds. No wheezing.   Abdominal:      General: Bowel sounds are normal. There is no distension.      Palpations: Abdomen is soft.      Tenderness: There is no abdominal tenderness.   Musculoskeletal:         General: Normal range of motion.      Cervical back: Normal range of motion and neck supple.   Skin:     General: Skin is warm and dry.   Neurological:      Mental Status: She is alert and oriented to person, place, and time.   Psychiatric:         Behavior: Behavior normal.     Significant Labs: CMP No results for input(s): NA, K, CL, CO2, GLU, BUN, CREATININE, CALCIUM, PROT, ALBUMIN, BILITOT, ALKPHOS, AST, ALT, ANIONGAP, ESTGFRAFRICA, EGFRNONAA in the last 48 hours., CBC   Recent Labs   Lab 12/25/22  1123   WBC 9.22   HGB 15.7   HCT 45.7      , INR No results for input(s): INR, PROTIME in the last 48 hours., and Troponin No results for input(s): TROPONINI in the last 48 hours.    Significant Imaging: EKG: see hpi    Assessment and Plan:     STEMI (ST elevation myocardial infarction)  61 yo F without significant medical history presenting with severe angina  Inferoposterior STEMI on ecg  Code stemi activated.    After LHC, will obtain TSH, A1c. Pt will need to be started on bb and high intensity statin along with DAPT, pending findings on LHC as well.    --LHC +/- PCI, patient is a MARQUES candidate  - Anti-platelet Therapy: ASA / Ticagrelor    - Allergies: No shellfish / Iodine allergy  - Pre-Hydration: NS  - Pre-Op Med: Bendaryl 50mg pO   -  All patient's questions were answered.  -The risks, benefits and alternatives of the procedure were explained to the patient.   -The risks of coronary angiography include but are not limited to: bleeding, infection, heart rhythm abnormalities, allergic reactions, kidney injury and potential need for dialysis, stroke and death.   - Should stenting be indicated, the patient has agreed to dual anti-platelet therapy for 1-consecutive year with a drug-eluting stent and a minimum of 1-month with the use of a bare metal stent  - Additionally, pt is aware that non-compliance is likely to result in stent clotting with heart attack, heart failure, and/or death  -The risks of moderate sedation include hypotension, respiratory depression, arrhythmias, bronchospasm, and death.   - Informed consent was obtained and the  patient is agreeable to proceed with the procedure.          VTE Risk Mitigation (From admission, onward)    None          Lisandro Beckett MD  Cardiology   [facility] - Cath Lab

## 2022-12-25 NOTE — ED NOTES
Patient leaves ED at this time with ED RN and cardiology. All patient belongings w family at this time. Consents at bedside. Patient on cardiac moniotor, bp cuff, and continuous pulse ox.

## 2022-12-25 NOTE — BRIEF OP NOTE
Brief Operative Note:    : Gilmar Ash MD     Referring Physician: Self,Aaareflaureano     All Operators: Surgeon(s):  MD Gilmar Mota MD     Preoperative Diagnosis: ST elevation myocardial infarction (STEMI), unspecified artery [I21.3]     Postop Diagnosis: ST elevation myocardial infarction (STEMI), unspecified artery [I21.3]    Treatments/Procedures: Procedure(s) (LRB):  CATHETERIZATION, HEART, BOTH LEFT AND RIGHT (N/A)  Stent, Drug Eluting, Single Vessel, Coronary  INSERTION, INTRA-AORTIC BALLOON PUMP    Access: Right CFA    Findings:Severe coronary artery disease is present.     See catheterization report for full details.    Intervention: s/p PCI to mid LAD with MARQUES X 2  Balloon pump placement     See catheterization report for full details.    Closure device: Perclose       Plan:  - Post cath protocol   - IVF @ 150 cc/kg/hr x 4 hours  - Bed rest as long as she has a balloon pump  - Continue aspirin 81 mg daily indefinitely  - Continue Brilinta BID  for minimum 6 months, ideally up to 1 year  - Continue high intensity statin therapy (LDL goal < 70)  - Risk factor reduction (BP <130/80 mmHg, glycemic control, etc)  - Cardiac rehab referral  - Follow up with outpatient cardiologist    Estimated Blood loss: 20 cc    Specimens removed: No    Michelle Houston MD

## 2022-12-25 NOTE — HPI
Ms. Nai Boo is a 63 yo F with no known medical history presenting with crushing chest pain at 1030 this AM. She denies any exertional CP in the preceding months but does report an episode of chest pressure at rest yesterday that was similar to today's presentation but less severe. She was in normal state of health noting only calf claudication 2 months ago. She also reports 1-2 months of GAMINO after climbing stairs but considered this to be 2/2 deconditioning. She attends an exercise class once per week and denies any decrease in exercise tolerance in these classes. Today, symptoms were more severe and persistent. Symptoms started around 10-10:30am morning of admission. She was brought to ER by her . She also reports that she is a lifetime non-smoker, does not drink and denies illicit's. Family history pertinent for a brother with MI in 50s, Mother CVA in 90s, Father MI in 70s. She does report increased stress at work after her boss passed away 6 months ago with an increased workload leading to trouble sleeping and panic attacks.     In the ED, patient found to be in significant distress but hemodynamically stable. ECG showed inferior and posterior stemi findings. She was given morpine. Bedside echo showed infero septal hypokinesis with a quick picture. Code stemi called. SHe was loaded with ASA, brilinta, given 5k of heparin. Pt rolled to cath lab emergently after procedure discussed and consents obtained by patient and her .

## 2022-12-25 NOTE — SUBJECTIVE & OBJECTIVE
Past Medical History:   Diagnosis Date    Hypertension     Trigger finger        Past Surgical History:   Procedure Laterality Date    AMPUTATION Left     finger amputation x2    BREAST BIOPSY Left 2016    benign     SECTION      EYE SURGERY      cataracts    SINUS SURGERY      TRIGGER FINGER RELEASE Right 3/15/2021    Procedure: RELEASE, TRIGGER THUMB;  Surgeon: Ailyn Renee MD;  Location: [facility];  Service: Orthopedics;  Laterality: Right;  rt thumb       Review of patient's allergies indicates:   Allergen Reactions    Sulfa (sulfonamide antibiotics) Swelling       No current facility-administered medications on file prior to encounter.     Current Outpatient Medications on File Prior to Encounter   Medication Sig    acetaminophen (TYLENOL) 650 MG TbSR Take 1 tablet (650 mg total) by mouth every 8 (eight) hours. For three days then as needed    carboxymethylcellulose sodium (THERATEARS OPHT) Apply to eye.    multivitamin capsule Take 1 capsule by mouth once daily.    naproxen sodium (ALEVE) 220 MG tablet Take 1 tablet (220 mg total) by mouth 2 (two) times daily with meals. For 3 days then prn    oxyCODONE (ROXICODONE) 5 MG immediate release tablet Take 1 to 2 tablets by mouth every 4 hours as needed for pain     Family History    None       Tobacco Use    Smoking status: Never    Smokeless tobacco: Not on file   Substance and Sexual Activity    Alcohol use: Yes     Comment: occasinal     Drug use: Not on file    Sexual activity: Not on file     Review of Systems   Constitutional: Negative for chills, diaphoresis, fever, weight gain and weight loss.   HENT:  Negative for congestion, hearing loss, nosebleeds, sore throat and tinnitus.    Eyes:  Negative for visual disturbance.   Cardiovascular:  Positive for chest pain. Negative for leg swelling, near-syncope, orthopnea, palpitations, paroxysmal nocturnal dyspnea and syncope.   Respiratory:  Negative for cough, hemoptysis, sputum production and  wheezing.    Endocrine: Negative for cold intolerance, heat intolerance and polyuria.   Skin:  Negative for nail changes and rash.   Musculoskeletal:  Positive for muscle cramps. Negative for back pain, falls, joint pain and muscle weakness.   Gastrointestinal:  Positive for heartburn. Negative for abdominal pain, constipation, diarrhea, hematemesis, hematochezia, melena, nausea and vomiting.   Genitourinary:  Negative for dysuria and hematuria.   Neurological:  Negative for excessive daytime sleepiness, dizziness and light-headedness.   Objective:     Vital Signs (Most Recent):  Temp: 97.8 °F (36.6 °C) (12/25/22 1345)  Pulse: 78 (12/25/22 1421)  Resp: 18 (12/25/22 1421)  BP: (!) 140/81 (12/25/22 1421)  SpO2: 99 % (12/25/22 1421)   Vital Signs (24h Range):  Temp:  [97.5 °F (36.4 °C)-97.8 °F (36.6 °C)] 97.8 °F (36.6 °C)  Pulse:  [61-81] 78  Resp:  [14-31] 18  SpO2:  [92 %-100 %] 99 %  BP: (115-152)/() 140/81     Weight: 51.3 kg (113 lb)  Body mass index is 18.8 kg/m².    SpO2: 99 %         Intake/Output Summary (Last 24 hours) at 12/25/2022 1426  Last data filed at 12/25/2022 1400  Gross per 24 hour   Intake 1773.83 ml   Output --   Net 1773.83 ml       Lines/Drains/Airways       Peripheral Intravenous Line  Duration                  Peripheral IV - Single Lumen 03/15/21 0639 20 G Left Hand 650 days         Peripheral IV - Single Lumen 12/25/22 1118 18 G Right Antecubital <1 day         Peripheral IV - Single Lumen 12/25/22 1119 20 G Left Antecubital <1 day                    Physical Exam  Constitutional:       General: She is in acute distress.      Appearance: Normal appearance. She is ill-appearing.   HENT:      Head: Normocephalic and atraumatic.      Right Ear: External ear normal.      Left Ear: External ear normal.      Nose: Nose normal. No congestion or rhinorrhea.      Mouth/Throat:      Mouth: Mucous membranes are moist.      Pharynx: Oropharynx is clear.   Eyes:      General: No scleral icterus.      Extraocular Movements: Extraocular movements intact.   Cardiovascular:      Rate and Rhythm: Normal rate and regular rhythm.      Comments: IABP 1:1   Pulmonary:      Effort: Pulmonary effort is normal. No respiratory distress.      Breath sounds: Normal breath sounds. No wheezing or rales.   Abdominal:      General: Abdomen is flat. There is no distension.      Palpations: Abdomen is soft.      Tenderness: There is no abdominal tenderness.   Musculoskeletal:         General: No tenderness. Normal range of motion.      Cervical back: Normal range of motion and neck supple. No tenderness.      Right lower leg: No edema.      Left lower leg: No edema.      Comments: R groin access site without bleeding   Skin:     General: Skin is warm and dry.      Capillary Refill: Capillary refill takes less than 2 seconds.      Coloration: Skin is not jaundiced.      Findings: No erythema or rash.   Neurological:      General: No focal deficit present.      Mental Status: She is alert and oriented to person, place, and time. Mental status is at baseline.       Significant Labs: All pertinent lab results from the last 24 hours have been reviewed.

## 2022-12-25 NOTE — H&P
[facility]  Cardiology  History and Physical     Patient Name: Nai Boo  MRN: [id number]  Admission Date: 12/25/2022  Code Status: Full Code   Attending Provider: Agustin Helms MD   Primary Care Physician: Jaycee Barriga MD  Principal Problem:<principal problem not specified>    Patient information was obtained from patient, spouse/SO, EMS personnel, past medical records and ER records.     Subjective:     Chief Complaint:  Chest pain     HPI:  Ms. Nai Boo is a 63 yo F with no known medical history presenting with crushing chest pain at 1030 this AM. She denies any exertional CP in the preceding months but does report an episode of chest pressure at rest yesterday that was similar to today's presentation but less severe. She was in normal state of health noting only calf claudication 2 months ago. She also reports 1-2 months of GAMINO after climbing stairs but considered this to be 2/2 deconditioning. She attends an exercise class once per week and denies any decrease in exercise tolerance in these classes. Today, symptoms were more severe and persistent. Symptoms started around 10-10:30am morning of admission. She was brought to ER by her . She also reports that she is a lifetime non-smoker, does not drink and denies illicit's. Family history pertinent for a brother with MI in 50s, Mother CVA in 90s, Father MI in 70s. She does report increased stress at work after her boss passed away 6 months ago with an increased workload leading to trouble sleeping and panic attacks.     In the ED, patient found to be in significant distress but hemodynamically stable. ECG showed inferior and posterior stemi findings. She was given morpine. Bedside echo showed infero septal hypokinesis with a quick picture. Code stemi called. SHe was loaded with ASA, brilinta, given 5k of heparin. Pt rolled to cath lab emergently after procedure discussed and consents obtained by patient and her  .      Past Medical History:   Diagnosis Date    Hypertension     Trigger finger        Past Surgical History:   Procedure Laterality Date    AMPUTATION Left     finger amputation x2    BREAST BIOPSY Left 2016    benign     SECTION      EYE SURGERY      cataracts    SINUS SURGERY      TRIGGER FINGER RELEASE Right 3/15/2021    Procedure: RELEASE, TRIGGER THUMB;  Surgeon: Ailyn Renee MD;  Location: [facility];  Service: Orthopedics;  Laterality: Right;  rt thumb       Review of patient's allergies indicates:   Allergen Reactions    Sulfa (sulfonamide antibiotics) Swelling       No current facility-administered medications on file prior to encounter.     Current Outpatient Medications on File Prior to Encounter   Medication Sig    acetaminophen (TYLENOL) 650 MG TbSR Take 1 tablet (650 mg total) by mouth every 8 (eight) hours. For three days then as needed    carboxymethylcellulose sodium (THERATEARS OPHT) Apply to eye.    multivitamin capsule Take 1 capsule by mouth once daily.    naproxen sodium (ALEVE) 220 MG tablet Take 1 tablet (220 mg total) by mouth 2 (two) times daily with meals. For 3 days then prn    oxyCODONE (ROXICODONE) 5 MG immediate release tablet Take 1 to 2 tablets by mouth every 4 hours as needed for pain     Family History    None       Tobacco Use    Smoking status: Never    Smokeless tobacco: Not on file   Substance and Sexual Activity    Alcohol use: Yes     Comment: occasinal     Drug use: Not on file    Sexual activity: Not on file     Review of Systems   Constitutional: Negative for chills, diaphoresis, fever, weight gain and weight loss.   HENT:  Negative for congestion, hearing loss, nosebleeds, sore throat and tinnitus.    Eyes:  Negative for visual disturbance.   Cardiovascular:  Positive for chest pain. Negative for leg swelling, near-syncope, orthopnea, palpitations, paroxysmal nocturnal dyspnea and syncope.   Respiratory:  Negative for cough,  hemoptysis, sputum production and wheezing.    Endocrine: Negative for cold intolerance, heat intolerance and polyuria.   Skin:  Negative for nail changes and rash.   Musculoskeletal:  Positive for muscle cramps. Negative for back pain, falls, joint pain and muscle weakness.   Gastrointestinal:  Positive for heartburn. Negative for abdominal pain, constipation, diarrhea, hematemesis, hematochezia, melena, nausea and vomiting.   Genitourinary:  Negative for dysuria and hematuria.   Neurological:  Negative for excessive daytime sleepiness, dizziness and light-headedness.   Objective:     Vital Signs (Most Recent):  Temp: 97.8 °F (36.6 °C) (12/25/22 1345)  Pulse: 78 (12/25/22 1421)  Resp: 18 (12/25/22 1421)  BP: (!) 140/81 (12/25/22 1421)  SpO2: 99 % (12/25/22 1421)   Vital Signs (24h Range):  Temp:  [97.5 °F (36.4 °C)-97.8 °F (36.6 °C)] 97.8 °F (36.6 °C)  Pulse:  [61-81] 78  Resp:  [14-31] 18  SpO2:  [92 %-100 %] 99 %  BP: (115-152)/() 140/81     Weight: 51.3 kg (113 lb)  Body mass index is 18.8 kg/m².    SpO2: 99 %         Intake/Output Summary (Last 24 hours) at 12/25/2022 1426  Last data filed at 12/25/2022 1400  Gross per 24 hour   Intake 1773.83 ml   Output --   Net 1773.83 ml       Lines/Drains/Airways       Peripheral Intravenous Line  Duration                  Peripheral IV - Single Lumen 03/15/21 0639 20 G Left Hand 650 days         Peripheral IV - Single Lumen 12/25/22 1118 18 G Right Antecubital <1 day         Peripheral IV - Single Lumen 12/25/22 1119 20 G Left Antecubital <1 day                    Physical Exam  Constitutional:       General: She is in acute distress.      Appearance: Normal appearance. She is ill-appearing.   HENT:      Head: Normocephalic and atraumatic.      Right Ear: External ear normal.      Left Ear: External ear normal.      Nose: Nose normal. No congestion or rhinorrhea.      Mouth/Throat:      Mouth: Mucous membranes are moist.      Pharynx: Oropharynx is clear.   Eyes:       General: No scleral icterus.     Extraocular Movements: Extraocular movements intact.   Cardiovascular:      Rate and Rhythm: Normal rate and regular rhythm.      Comments: IABP 1:1   Pulmonary:      Effort: Pulmonary effort is normal. No respiratory distress.      Breath sounds: Normal breath sounds. No wheezing or rales.   Abdominal:      General: Abdomen is flat. There is no distension.      Palpations: Abdomen is soft.      Tenderness: There is no abdominal tenderness.   Musculoskeletal:         General: No tenderness. Normal range of motion.      Cervical back: Normal range of motion and neck supple. No tenderness.      Right lower leg: No edema.      Left lower leg: No edema.      Comments: R groin access site without bleeding   Skin:     General: Skin is warm and dry.      Capillary Refill: Capillary refill takes less than 2 seconds.      Coloration: Skin is not jaundiced.      Findings: No erythema or rash.   Neurological:      General: No focal deficit present.      Mental Status: She is alert and oriented to person, place, and time. Mental status is at baseline.       Significant Labs: All pertinent lab results from the last 24 hours have been reviewed.        Assessment and Plan:     On intra-aortic balloon pump assist  IABP left in place following C for assist.     Plan  - Daily CXR   - Daily coags   - Trend daily LDH     STEMI (ST elevation myocardial infarction)  61 yo F without significant medical history presenting with severe angina  Inferoposterior STEMI on ecg  Code stemi activated.    [facility] with severe CAD and subtotal occlusion of the RCA requiring x2 MARQUES. IABP placed. Agristat started intraoperatively.     Plan   - Continue Agristat for 24 hours   - DAPT with ticagrelor 6 months and ASA 1 year  - Atorvastatin 80mg  - Continuous telemetry   - Morphine 2mg PRN   - Nitro gtt for hypertension  - Maintain Euvolemia, avoid hypovolemia give RV involvement  - Trend toponin to peak  - TSH, A1c, Lipid  panel ordered        VTE Risk Mitigation (From admission, onward)         Ordered     IP VTE HIGH RISK PATIENT  Once         12/25/22 1157     Place sequential compression device  Until discontinued         12/25/22 1157                Salvatore Trujillo DO  Cardiology   [facility] - Cardiac Int[facility]

## 2022-12-25 NOTE — ED PROVIDER NOTES
"Encounter Date: 2022       History     Chief Complaint   Patient presents with    Abdominal Pain    Chest Pain     X 3 days, pt pale and diaphoretic in triage     Muscle Pain     "Both arms feel very heavy"     63 yo W with pmhx HTN presents with chief complaint of chest pain.  I received the triage EKG that was consistent with an inferior lateral STEMI and activated the cath lab.  Patient notes she is chest pain radiating to her back.  It is described as "heaviness".  It began with minimal exertion 1 hour prior.  It is constant.  She is been suffering from intermittent chest pain throughout the last week and she is taken Tums for it.  No history of CAD.  Her father had a defibrillator and  from heart disease in his 70s.  Her mother is 93 and had a stroke but is still living.  Her brother has had CAD.  History is limited secondary to acuity of condition.    Review of patient's allergies indicates:   Allergen Reactions    Sulfa (sulfonamide antibiotics) Swelling     Past Medical History:   Diagnosis Date    Hypertension     Trigger finger      Past Surgical History:   Procedure Laterality Date    AMPUTATION Left     finger amputation x2    BREAST BIOPSY Left 2016    benign     SECTION      EYE SURGERY      cataracts    SINUS SURGERY      TRIGGER FINGER RELEASE Right 3/15/2021    Procedure: RELEASE, TRIGGER THUMB;  Surgeon: Ailyn Renee MD;  Location: [facility];  Service: Orthopedics;  Laterality: Right;  rt thumb     History reviewed. No pertinent family history.  Social History     Tobacco Use    Smoking status: Never   Substance Use Topics    Alcohol use: Yes     Comment: occasinal      Review of Systems   Constitutional:  Negative for fever.   HENT:  Negative for sore throat.    Respiratory:  Negative for shortness of breath.    Cardiovascular:  Positive for chest pain.   Gastrointestinal:  Negative for nausea.   Genitourinary:  Negative for dysuria.   Musculoskeletal:  Negative for back " pain.   Skin:  Negative for rash.   Neurological:  Positive for weakness.   Hematological:  Does not bruise/bleed easily.     Physical Exam     Initial Vitals   BP Pulse Resp Temp SpO2   12/25/22 1059 12/25/22 1059 12/25/22 1059 12/25/22 1132 12/25/22 1059   (!) 115/58 61 (!) 24 97.5 °F (36.4 °C) (!) 92 %      MAP       --                Physical Exam    Nursing note and vitals reviewed.  Constitutional: She appears well-developed and well-nourished. She is not diaphoretic. She appears distressed.   HENT:   Head: Normocephalic and atraumatic.   Eyes: Right eye exhibits no discharge. Left eye exhibits no discharge. No scleral icterus.   Neck: Neck supple. No JVD present.   Normal range of motion.  Cardiovascular:  Normal rate, regular rhythm and normal heart sounds.     Exam reveals no gallop and no friction rub.       No murmur heard.  Pulmonary/Chest: Breath sounds normal. No respiratory distress. She has no wheezes. She has no rhonchi. She has no rales. She exhibits no tenderness.   Abdominal: Abdomen is soft. She exhibits no distension and no mass. There is no abdominal tenderness. There is no rebound and no guarding.   Musculoskeletal:         General: No tenderness or edema. Normal range of motion.      Cervical back: Normal range of motion and neck supple.     Neurological: She is alert and oriented to person, place, and time. No sensory deficit.   Skin: Skin is warm. Capillary refill takes less than 2 seconds. There is pallor.   Psychiatric: Thought content normal.       ED Course   Procedures  Labs Reviewed   CBC W/ AUTO DIFFERENTIAL - Abnormal; Notable for the following components:       Result Value    MCH 33.3 (*)     All other components within normal limits   URINALYSIS, REFLEX TO URINE CULTURE   POCT URINE PREGNANCY     EKG Readings: (Independently Interpreted)   Initial Reading: STEMI. Rhythm: Normal Sinus Rhythm. Heart Rate: 60. ST Segment Elevation: II, III, AVF, V3, V4, V5 and V6. ST Segment  Depression: I. Axis: Normal.     Imaging Results    None          Medications   clopidogreL (PLAVIX) 300 mg tablet (  Not Given 12/25/22 1130)   nitroGLYCERIN (NITROSTAT) 0.4 MG SL tablet (  Not Given 12/25/22 1130)   sodium chloride 0.9% flush 10 mL (has no administration in time range)   sodium chloride 0.9% flush 10 mL (has no administration in time range)   LIDOcaine HCL 10 mg/ml (1%) injection (10 mLs Other Given 12/25/22 1156)   heparin infusion 1,000 units/500 ml in 0.9% NaCl (on sterile field) (1,500 mLs Intra-Catheter Given 12/25/22 1158)   fentaNYL 50 mcg/mL injection (50 mcg Intravenous Given 12/25/22 1159)   midazolam (VERSED) 1 mg/mL injection (0.5 mg Intravenous Given 12/25/22 1159)   heparin (porcine) injection (4,000 Units Intravenous Given 12/25/22 1158)   aspirin tablet 325 mg (325 mg Oral Given 12/25/22 1116)   ticagrelor tablet 180 mg (0 mg Oral Not Given 12/25/22 1130)   morphine injection 2 mg (2 mg Intravenous Given 12/25/22 1117)   sodium chloride 0.9% bolus 500 mL 500 mL (0 mLs Intravenous Stopped 12/25/22 1143)   heparin (porcine) injection 5,000 Units (5,000 Units Intravenous Given 12/25/22 1133)     Medical Decision Making:   History:   I obtained history from: someone other than patient.  Old Medical Records: I decided to obtain old medical records.  Initial Assessment:   61 yo W with pmhx HTN presents with chief complaint of chest pain.  EKG consistent with inferior lateral STEMI.  Initial blood pressure soft.  Patient brought promptly to resuscitation room.  Code STEMI activated.  Loaded with aspirin, Plavix.  Given inferior location and soft blood pressure, I held nitro.  We administered IV fluids and morphine morphine.  Differential Diagnosis:   STEMI, dissection, vasospasm, reflux  Clinical Tests:   Lab Tests: Ordered  Radiological Study: Ordered  Medical Tests: Ordered  ED Management:  Patient reported minimal improvement in symptoms status post morphine.  Cardiology fellow at  bedside.  Bedside ultrasound performed by myself reveals inferior wall motion abnormalities.  She will be transported to the cath lab.    Reassessment:  Patient remains in ED as cath lab staff are being called in as it is a holiday.  Blood pressure trending up, most recent 148/79.  On repeat assessment she has improved color.  She reports pain is still present but improved.  Discussed nitro with Cardiology fellow but will hold at this time given concern for inferior STEMI.  Additional morphine ordered.  Patient will be transported to cath lab now.                        Clinical Impression:   Final diagnoses:  [R07.9] Chest pain  [I21.3] ST elevation myocardial infarction (STEMI), unspecified artery (Primary)        ED Disposition Condition    Admit Stable                Guero Banks MD  12/25/22 9290

## 2022-12-25 NOTE — ASSESSMENT & PLAN NOTE
63 yo F without significant medical history presenting with severe angina  Inferoposterior STEMI on ecg  Code stemi activated.    [facility] with severe CAD and subtotal occlusion of the RCA requiring x2 MARQUES. IABP placed. Agristat started intraoperatively.     Plan   - Continue Agristat for 24 hours   - DAPT with ticagrelor 6 months and ASA 1 year  - Atorvastatin 80mg  - Continuous telemetry   - Morphine 2mg PRN   - Nitro gtt for hypertension  - Maintain Euvolemia, avoid hypovolemia give RV involvement  - Trend toponin to peak  - TSH, A1c, Lipid panel ordered

## 2022-12-25 NOTE — SUBJECTIVE & OBJECTIVE
Past Medical History:   Diagnosis Date    Hypertension     Trigger finger        Past Surgical History:   Procedure Laterality Date    AMPUTATION Left     finger amputation x2    BREAST BIOPSY Left 2016    benign     SECTION      EYE SURGERY      cataracts    SINUS SURGERY      TRIGGER FINGER RELEASE Right 3/15/2021    Procedure: RELEASE, TRIGGER THUMB;  Surgeon: Ailyn Renee MD;  Location: [facility];  Service: Orthopedics;  Laterality: Right;  rt thumb       Review of patient's allergies indicates:   Allergen Reactions    Sulfa (sulfonamide antibiotics) Swelling       No current facility-administered medications on file prior to encounter.     Current Outpatient Medications on File Prior to Encounter   Medication Sig    acetaminophen (TYLENOL) 650 MG TbSR Take 1 tablet (650 mg total) by mouth every 8 (eight) hours. For three days then as needed    carboxymethylcellulose sodium (THERATEARS OPHT) Apply to eye.    multivitamin capsule Take 1 capsule by mouth once daily.    naproxen sodium (ALEVE) 220 MG tablet Take 1 tablet (220 mg total) by mouth 2 (two) times daily with meals. For 3 days then prn    oxyCODONE (ROXICODONE) 5 MG immediate release tablet Take 1 to 2 tablets by mouth every 4 hours as needed for pain     Family History    None       Tobacco Use    Smoking status: Never    Smokeless tobacco: Not on file   Substance and Sexual Activity    Alcohol use: Yes     Comment: occasinal     Drug use: Not on file    Sexual activity: Not on file     Review of Systems   Reason unable to perform ROS: negative aside from what is mentioned in hpi.   All other systems reviewed and are negative.  Objective:     Vital Signs (Most Recent):  Temp: 97.5 °F (36.4 °C) (22 1132)  Pulse: 62 (22 1138)  Resp: 20 (22 1138)  BP: (!) 150/71 (22 1138)  SpO2: 100 % (22 1138)   Vital Signs (24h Range):  Temp:  [97.5 °F (36.4 °C)] 97.5 °F (36.4 °C)  Pulse:  [61-77] 62  Resp:  [20-24] 20  SpO2:   [92 %-100 %] 100 %  BP: (115-152)/() 150/71     Weight: 51.3 kg (113 lb)  Body mass index is 18.8 kg/m².    SpO2: 100 %       No intake or output data in the 24 hours ending 12/25/22 1150    Lines/Drains/Airways       Peripheral Intravenous Line  Duration                  Peripheral IV - Single Lumen 03/15/21 0639 20 G Left Hand 650 days         Peripheral IV - Single Lumen 12/25/22 1118 18 G Right Antecubital <1 day         Peripheral IV - Single Lumen 12/25/22 1119 20 G Left Antecubital <1 day                    Physical Exam  Constitutional:       Appearance: She is well-developed.   HENT:      Head: Normocephalic and atraumatic.      Right Ear: External ear normal.      Left Ear: External ear normal.   Eyes:      Conjunctiva/sclera: Conjunctivae normal.   Cardiovascular:      Rate and Rhythm: Normal rate and regular rhythm.      Pulses: Intact distal pulses.           Radial pulses are 2+ on the right side and 2+ on the left side.      Heart sounds: Normal heart sounds.   Pulmonary:      Effort: Pulmonary effort is normal. No respiratory distress.      Breath sounds: Normal breath sounds. No wheezing.   Abdominal:      General: Bowel sounds are normal. There is no distension.      Palpations: Abdomen is soft.      Tenderness: There is no abdominal tenderness.   Musculoskeletal:         General: Normal range of motion.      Cervical back: Normal range of motion and neck supple.   Skin:     General: Skin is warm and dry.   Neurological:      Mental Status: She is alert and oriented to person, place, and time.   Psychiatric:         Behavior: Behavior normal.     Significant Labs: CMP No results for input(s): NA, K, CL, CO2, GLU, BUN, CREATININE, CALCIUM, PROT, ALBUMIN, BILITOT, ALKPHOS, AST, ALT, ANIONGAP, ESTGFRAFRICA, EGFRNONAA in the last 48 hours., CBC   Recent Labs   Lab 12/25/22  1123   WBC 9.22   HGB 15.7   HCT 45.7      , INR No results for input(s): INR, PROTIME in the last 48 hours., and  Troponin No results for input(s): TROPONINI in the last 48 hours.    Significant Imaging: EKG: see hpi

## 2022-12-25 NOTE — ED TRIAGE NOTES
Patient presents to the ER with complaints of chest pain and shortness of breath for a few days, that progressively got worse approx 1 hour ago patient has pain in both arms and a headache. Patient states both arms are numb. Pt has no cardiac history. Pain is center of chest radiates to the back. Pain 8 out of 10 at this time.

## 2022-12-25 NOTE — ED NOTES
Patient ready for cath-lab, patient in hospital [facility], cath lab leads in place, awaiting cardiology to come to bedside

## 2022-12-25 NOTE — ASSESSMENT & PLAN NOTE
61 yo F without significant medical history presenting with severe angina  Inferoposterior STEMI on ecg  Code stemi activated.    After LHC, will obtain TSH, A1c. Pt will need to be started on bb and high intensity statin along with DAPT, pending findings on LHC as well.    --LHC +/- PCI, patient is a MARQUES candidate  - Anti-platelet Therapy: ASA / Ticagrelor    - Allergies: No shellfish / Iodine allergy  - Pre-Hydration: NS  - Pre-Op Med: Bendaryl 50mg pO   - All patient's questions were answered.  -The risks, benefits and alternatives of the procedure were explained to the patient.   -The risks of coronary angiography include but are not limited to: bleeding, infection, heart rhythm abnormalities, allergic reactions, kidney injury and potential need for dialysis, stroke and death.   - Should stenting be indicated, the patient has agreed to dual anti-platelet therapy for 1-consecutive year with a drug-eluting stent and a minimum of 1-month with the use of a bare metal stent  - Additionally, pt is aware that non-compliance is likely to result in stent clotting with heart attack, heart failure, and/or death  -The risks of moderate sedation include hypotension, respiratory depression, arrhythmias, bronchospasm, and death.   - Informed consent was obtained and the  patient is agreeable to proceed with the procedure.

## 2022-12-26 PROBLEM — N95.2 ATROPHIC VAGINITIS: Status: ACTIVE | Noted: 2022-12-26

## 2022-12-26 PROBLEM — I73.00 RAYNAUD PHENOMENON: Status: ACTIVE | Noted: 2022-12-26

## 2022-12-26 PROBLEM — I10 ESSENTIAL HYPERTENSION: Status: ACTIVE | Noted: 2022-12-26

## 2022-12-26 PROBLEM — E78.2 MIXED HYPERLIPIDEMIA: Status: ACTIVE | Noted: 2022-12-26

## 2022-12-26 PROBLEM — R92.8 ABNORMAL FINDINGS ON DIAGNOSTIC IMAGING OF BREAST: Status: ACTIVE | Noted: 2022-12-26

## 2022-12-26 LAB
ALBUMIN SERPL BCP-MCNC: 3.1 G/DL (ref 3.5–5.2)
ALP SERPL-CCNC: 58 U/L (ref 55–135)
ALT SERPL W/O P-5'-P-CCNC: 34 U/L (ref 10–44)
ANION GAP SERPL CALC-SCNC: 6 MMOL/L (ref 8–16)
ANION GAP SERPL CALC-SCNC: 6 MMOL/L (ref 8–16)
AST SERPL-CCNC: 194 U/L (ref 10–40)
BASOPHILS # BLD AUTO: 0.02 K/UL (ref 0–0.2)
BASOPHILS NFR BLD: 0.3 % (ref 0–1.9)
BILIRUB SERPL-MCNC: 0.8 MG/DL (ref 0.1–1)
BILIRUB UR QL STRIP: NEGATIVE
BUN SERPL-MCNC: 15 MG/DL (ref 8–23)
BUN SERPL-MCNC: 16 MG/DL (ref 8–23)
CALCIUM SERPL-MCNC: 8.4 MG/DL (ref 8.7–10.5)
CALCIUM SERPL-MCNC: 8.6 MG/DL (ref 8.7–10.5)
CHLORIDE SERPL-SCNC: 107 MMOL/L (ref 95–110)
CHLORIDE SERPL-SCNC: 109 MMOL/L (ref 95–110)
CHOLEST SERPL-MCNC: 149 MG/DL (ref 120–199)
CHOLEST/HDLC SERPL: 4.4 {RATIO} (ref 2–5)
CLARITY UR REFRACT.AUTO: CLEAR
CO2 SERPL-SCNC: 24 MMOL/L (ref 23–29)
CO2 SERPL-SCNC: 27 MMOL/L (ref 23–29)
COLOR UR AUTO: YELLOW
CREAT SERPL-MCNC: 0.7 MG/DL (ref 0.5–1.4)
CREAT SERPL-MCNC: 0.7 MG/DL (ref 0.5–1.4)
DIFFERENTIAL METHOD: ABNORMAL
EOSINOPHIL # BLD AUTO: 0 K/UL (ref 0–0.5)
EOSINOPHIL NFR BLD: 0.3 % (ref 0–8)
ERYTHROCYTE [DISTWIDTH] IN BLOOD BY AUTOMATED COUNT: 11.9 % (ref 11.5–14.5)
EST. GFR  (NO RACE VARIABLE): >60 ML/MIN/1.73 M^2
EST. GFR  (NO RACE VARIABLE): >60 ML/MIN/1.73 M^2
ESTIMATED AVG GLUCOSE: 103 MG/DL (ref 68–131)
GLUCOSE SERPL-MCNC: 102 MG/DL (ref 70–110)
GLUCOSE SERPL-MCNC: 97 MG/DL (ref 70–110)
GLUCOSE UR QL STRIP: NEGATIVE
HBA1C MFR BLD: 5.2 % (ref 4–5.6)
HCT VFR BLD AUTO: 39.2 % (ref 37–48.5)
HDLC SERPL-MCNC: 34 MG/DL (ref 40–75)
HDLC SERPL: 22.8 % (ref 20–50)
HGB BLD-MCNC: 12.9 G/DL (ref 12–16)
HGB UR QL STRIP: NEGATIVE
IMM GRANULOCYTES # BLD AUTO: 0.01 K/UL (ref 0–0.04)
IMM GRANULOCYTES NFR BLD AUTO: 0.1 % (ref 0–0.5)
KETONES UR QL STRIP: ABNORMAL
LDH SERPL L TO P-CCNC: 660 U/L (ref 110–260)
LDLC SERPL CALC-MCNC: 93 MG/DL (ref 63–159)
LEUKOCYTE ESTERASE UR QL STRIP: NEGATIVE
LYMPHOCYTES # BLD AUTO: 0.9 K/UL (ref 1–4.8)
LYMPHOCYTES NFR BLD: 10.9 % (ref 18–48)
MAGNESIUM SERPL-MCNC: 1.8 MG/DL (ref 1.6–2.6)
MAGNESIUM SERPL-MCNC: 2.7 MG/DL (ref 1.6–2.6)
MCH RBC QN AUTO: 31.5 PG (ref 27–31)
MCHC RBC AUTO-ENTMCNC: 32.9 G/DL (ref 32–36)
MCV RBC AUTO: 96 FL (ref 82–98)
MONOCYTES # BLD AUTO: 0.7 K/UL (ref 0.3–1)
MONOCYTES NFR BLD: 8.8 % (ref 4–15)
NEUTROPHILS # BLD AUTO: 6.3 K/UL (ref 1.8–7.7)
NEUTROPHILS NFR BLD: 79.6 % (ref 38–73)
NITRITE UR QL STRIP: NEGATIVE
NONHDLC SERPL-MCNC: 115 MG/DL
NRBC BLD-RTO: 0 /100 WBC
PH UR STRIP: 7 [PH] (ref 5–8)
PHOSPHATE SERPL-MCNC: 2.9 MG/DL (ref 2.7–4.5)
PLATELET # BLD AUTO: 110 K/UL (ref 150–450)
PMV BLD AUTO: 10.7 FL (ref 9.2–12.9)
POTASSIUM SERPL-SCNC: 3.7 MMOL/L (ref 3.5–5.1)
POTASSIUM SERPL-SCNC: 4.4 MMOL/L (ref 3.5–5.1)
PROT SERPL-MCNC: 5.2 G/DL (ref 6–8.4)
PROT UR QL STRIP: ABNORMAL
RBC # BLD AUTO: 4.1 M/UL (ref 4–5.4)
SODIUM SERPL-SCNC: 139 MMOL/L (ref 136–145)
SODIUM SERPL-SCNC: 140 MMOL/L (ref 136–145)
SP GR UR STRIP: >1.03 (ref 1–1.03)
T4 FREE SERPL-MCNC: 0.84 NG/DL (ref 0.71–1.51)
TRIGL SERPL-MCNC: 110 MG/DL (ref 30–150)
TROPONIN I SERPL DL<=0.01 NG/ML-MCNC: 41.46 NG/ML (ref 0–0.03)
TROPONIN I SERPL DL<=0.01 NG/ML-MCNC: >50 NG/ML (ref 0–0.03)
TSH SERPL DL<=0.005 MIU/L-ACNC: 7.47 UIU/ML (ref 0.4–4)
URN SPEC COLLECT METH UR: ABNORMAL
WBC # BLD AUTO: 7.91 K/UL (ref 3.9–12.7)

## 2022-12-26 PROCEDURE — 25000003 PHARM REV CODE 250: Performed by: STUDENT IN AN ORGANIZED HEALTH CARE EDUCATION/TRAINING PROGRAM

## 2022-12-26 PROCEDURE — 84484 ASSAY OF TROPONIN QUANT: CPT | Performed by: STUDENT IN AN ORGANIZED HEALTH CARE EDUCATION/TRAINING PROGRAM

## 2022-12-26 PROCEDURE — 99291 CRITICAL CARE FIRST HOUR: CPT | Mod: ,,, | Performed by: INTERNAL MEDICINE

## 2022-12-26 PROCEDURE — 93005 ELECTROCARDIOGRAM TRACING: CPT

## 2022-12-26 PROCEDURE — 84484 ASSAY OF TROPONIN QUANT: CPT | Mod: 91 | Performed by: STUDENT IN AN ORGANIZED HEALTH CARE EDUCATION/TRAINING PROGRAM

## 2022-12-26 PROCEDURE — 27000202 HC IABP, ADD'L DAY

## 2022-12-26 PROCEDURE — 99291 PR CRITICAL CARE, E/M 30-74 MINUTES: ICD-10-PCS | Mod: ,,, | Performed by: INTERNAL MEDICINE

## 2022-12-26 PROCEDURE — 94761 N-INVAS EAR/PLS OXIMETRY MLT: CPT

## 2022-12-26 PROCEDURE — 83615 LACTATE (LD) (LDH) ENZYME: CPT

## 2022-12-26 PROCEDURE — 80048 BASIC METABOLIC PNL TOTAL CA: CPT | Performed by: INTERNAL MEDICINE

## 2022-12-26 PROCEDURE — 25000003 PHARM REV CODE 250

## 2022-12-26 PROCEDURE — 80053 COMPREHEN METABOLIC PANEL: CPT | Performed by: STUDENT IN AN ORGANIZED HEALTH CARE EDUCATION/TRAINING PROGRAM

## 2022-12-26 PROCEDURE — 83036 HEMOGLOBIN GLYCOSYLATED A1C: CPT | Performed by: INTERNAL MEDICINE

## 2022-12-26 PROCEDURE — 80061 LIPID PANEL: CPT | Performed by: STUDENT IN AN ORGANIZED HEALTH CARE EDUCATION/TRAINING PROGRAM

## 2022-12-26 PROCEDURE — 84443 ASSAY THYROID STIM HORMONE: CPT | Performed by: STUDENT IN AN ORGANIZED HEALTH CARE EDUCATION/TRAINING PROGRAM

## 2022-12-26 PROCEDURE — 51798 US URINE CAPACITY MEASURE: CPT

## 2022-12-26 PROCEDURE — 83735 ASSAY OF MAGNESIUM: CPT | Performed by: STUDENT IN AN ORGANIZED HEALTH CARE EDUCATION/TRAINING PROGRAM

## 2022-12-26 PROCEDURE — 84100 ASSAY OF PHOSPHORUS: CPT | Performed by: STUDENT IN AN ORGANIZED HEALTH CARE EDUCATION/TRAINING PROGRAM

## 2022-12-26 PROCEDURE — 20000000 HC ICU ROOM

## 2022-12-26 PROCEDURE — 63600175 PHARM REV CODE 636 W HCPCS: Performed by: STUDENT IN AN ORGANIZED HEALTH CARE EDUCATION/TRAINING PROGRAM

## 2022-12-26 PROCEDURE — 84439 ASSAY OF FREE THYROXINE: CPT | Performed by: STUDENT IN AN ORGANIZED HEALTH CARE EDUCATION/TRAINING PROGRAM

## 2022-12-26 PROCEDURE — 93010 ELECTROCARDIOGRAM REPORT: CPT | Mod: ,,, | Performed by: INTERNAL MEDICINE

## 2022-12-26 PROCEDURE — 85025 COMPLETE CBC W/AUTO DIFF WBC: CPT | Performed by: STUDENT IN AN ORGANIZED HEALTH CARE EDUCATION/TRAINING PROGRAM

## 2022-12-26 PROCEDURE — 83735 ASSAY OF MAGNESIUM: CPT | Mod: 91 | Performed by: INTERNAL MEDICINE

## 2022-12-26 PROCEDURE — 93010 EKG 12-LEAD: ICD-10-PCS | Mod: ,,, | Performed by: INTERNAL MEDICINE

## 2022-12-26 RX ORDER — FLUTICASONE PROPIONATE 50 MCG
2 SPRAY, SUSPENSION (ML) NASAL DAILY
Status: DISCONTINUED | OUTPATIENT
Start: 2022-12-26 | End: 2022-12-28 | Stop reason: HOSPADM

## 2022-12-26 RX ORDER — POTASSIUM CHLORIDE 20 MEQ/1
40 TABLET, EXTENDED RELEASE ORAL ONCE
Status: COMPLETED | OUTPATIENT
Start: 2022-12-26 | End: 2022-12-26

## 2022-12-26 RX ORDER — METOPROLOL TARTRATE 25 MG/1
25 TABLET, FILM COATED ORAL 2 TIMES DAILY
Status: DISCONTINUED | OUTPATIENT
Start: 2022-12-26 | End: 2022-12-28

## 2022-12-26 RX ORDER — TIROFIBAN HYDROCHLORIDE 50 UG/ML
0.07 INJECTION INTRAVENOUS CONTINUOUS
Status: DISCONTINUED | OUTPATIENT
Start: 2022-12-26 | End: 2022-12-26

## 2022-12-26 RX ORDER — SODIUM,POTASSIUM PHOSPHATES 280-250MG
1 POWDER IN PACKET (EA) ORAL ONCE
Status: COMPLETED | OUTPATIENT
Start: 2022-12-26 | End: 2022-12-26

## 2022-12-26 RX ORDER — LIDOCAINE 50 MG/G
2 PATCH TOPICAL
Status: DISCONTINUED | OUTPATIENT
Start: 2022-12-26 | End: 2022-12-28 | Stop reason: HOSPADM

## 2022-12-26 RX ORDER — MAGNESIUM SULFATE HEPTAHYDRATE 40 MG/ML
2 INJECTION, SOLUTION INTRAVENOUS ONCE
Status: COMPLETED | OUTPATIENT
Start: 2022-12-26 | End: 2022-12-26

## 2022-12-26 RX ADMIN — ATORVASTATIN CALCIUM 80 MG: 40 TABLET, FILM COATED ORAL at 08:12

## 2022-12-26 RX ADMIN — POTASSIUM & SODIUM PHOSPHATES POWDER PACK 280-160-250 MG 1 PACKET: 280-160-250 PACK at 08:12

## 2022-12-26 RX ADMIN — LIDOCAINE 2 PATCH: 50 PATCH CUTANEOUS at 02:12

## 2022-12-26 RX ADMIN — METOPROLOL TARTRATE 25 MG: 25 TABLET, FILM COATED ORAL at 08:12

## 2022-12-26 RX ADMIN — ASPIRIN 81 MG: 81 TABLET, CHEWABLE ORAL at 08:12

## 2022-12-26 RX ADMIN — MAGNESIUM SULFATE 2 G: 2 INJECTION INTRAVENOUS at 04:12

## 2022-12-26 RX ADMIN — METOPROLOL TARTRATE 25 MG: 25 TABLET, FILM COATED ORAL at 09:12

## 2022-12-26 RX ADMIN — TICAGRELOR 90 MG: 90 TABLET ORAL at 08:12

## 2022-12-26 RX ADMIN — POTASSIUM CHLORIDE 40 MEQ: 1500 TABLET, EXTENDED RELEASE ORAL at 04:12

## 2022-12-26 RX ADMIN — ACETAMINOPHEN 650 MG: 325 TABLET ORAL at 12:12

## 2022-12-26 RX ADMIN — TICAGRELOR 90 MG: 90 TABLET ORAL at 09:12

## 2022-12-26 NOTE — CARE UPDATE
Tirofiban gtt discontinued around 11:30AM and IABP in R CFA pulled at bedside this afternoon. IABP placed on standby, helium disconnected, and console turned off prior to removal. Manual pressure held for 25 minutes with hemostasis achieved at 2:15PM. Distal pulses intact and small hematoma noted laterally to access site.    Bed rest x4 hrs.  Pulse checks Q1 hr while on bedrest.     Orders placed and relayed to nursing.     Karlos Lundberg MD PGY5  Cardiovascular Medicine Fellow  [facility]  Pager: [phone]

## 2022-12-26 NOTE — PROGRESS NOTES
[facility] - Card[facility] Intensive [facility]  Cardiology  Progress Note    Patient Name: Nai Boo  MRN: [id number]  Admission Date: 12/25/2022  Hospital Length of Stay: 1 days  Code Status: Full Code   Attending Physician: Agustin Helms MD   Primary Care Physician: Jaycee Barriga MD  Expected Discharge Date:   Principal Problem:STEMI (ST elevation myocardial infarction)    Subjective:     Hospital Course:   Admitted to CCU after C where 2x MARQUES were placed to RCA for acute STEMI. IABP left in place. Had been loaded with heparin drip and DAPT with brillinta, tirofiban was also used while on IABP. IABP removed 12/26. With some ongoing chest achiness but this is different in quality from her initial ischemic chest pain.       Interval History: NAEON. Tirofiban stopped this morning    Review of Systems   Cardiovascular:  Positive for chest pain.   Musculoskeletal:  Positive for back pain.   Objective:     Vital Signs (Most Recent):  Temp: 97.9 °F (36.6 °C) (12/26/22 1100)  Pulse: 67 (12/26/22 1100)  Resp: (!) 22 (12/26/22 1100)  BP: (!) 145/67 (12/26/22 1100)  SpO2: 98 % (12/26/22 1100)   Vital Signs (24h Range):  Temp:  [97.5 °F (36.4 °C)-99.1 °F (37.3 °C)] 97.9 °F (36.6 °C)  Pulse:  [61-93] 67  Resp:  [13-31] 22  SpO2:  [95 %-100 %] 98 %  BP: (113-157)/(64-98) 145/67     Weight: 51.3 kg (113 lb 1.5 oz)  Body mass index is 18.82 kg/m².     SpO2: 98 %         Intake/Output Summary (Last 24 hours) at 12/26/2022 1310  Last data filed at 12/26/2022 0905  Gross per 24 hour   Intake 2808.09 ml   Output 550 ml   Net 2258.09 ml       Lines/Drains/Airways       Line  Duration                  IABP 12/25/22 1330 <1 day              Peripheral Intravenous Line  Duration                  Peripheral IV - Single Lumen 03/15/21 0639 20 G Left Hand 651 days         Peripheral IV - Single Lumen 12/25/22 1118 18 G Right Antecubital 1 day         Peripheral IV - Single Lumen 12/25/22 1119 20 G Left Antecubital 1 day                     Physical Exam  Vitals and nursing note reviewed.   Constitutional:       General: She is not in acute distress.     Appearance: She is not toxic-appearing or diaphoretic.   Cardiovascular:      Rate and Rhythm: Normal rate and regular rhythm.   Pulmonary:      Effort: Pulmonary effort is normal. No respiratory distress.      Breath sounds: No wheezing.   Abdominal:      Palpations: Abdomen is soft.      Tenderness: There is no guarding.   Musculoskeletal:      Right lower leg: No edema.      Left lower leg: No edema.   Skin:     General: Skin is warm and dry.   Neurological:      Mental Status: She is alert. Mental status is at baseline.      Cranial Nerves: No dysarthria.   Psychiatric:         Mood and Affect: Mood normal.         Behavior: Behavior normal.       Significant Labs: All pertinent lab results from the last 24 hours have been reviewed.    Significant Imaging:  na    Assessment and Plan:       * STEMI (ST elevation myocardial infarction)  61 yo F without significant medical history presenting with severe angina  Inferoposterior STEMI on ecg  Code stemi activated.    [facility] with severe CAD and subtotal occlusion of the RCA requiring x2 MARQUES. IABP placed. Agristat started intraoperatively.     Plan   - Continue Agristat for 18; now off  - Remove IABP today  - DAPT with ticagrelor 6 months and ASA 1 year  - Atorvastatin 80mg; goal LDL <70  - Continuous telemetry   - Morphine 2mg PRN   - Maintain Euvolemia, avoid hypovolemia give RV involvement  - Trend toponin to peak; peaked at >50k, stop tranding  - TSH, A1c, Lipid panel ordered    Essential hypertension  Not on medications for this at home    - assess regimen prior to discharge    On intra-aortic balloon pump assist  IABP left in place following [facility] for assist.     Plan  - Daily CXR   - Daily coags   - Trend daily LDH   - remove 12/26        VTE Risk Mitigation (From admission, onward)         Ordered     IP VTE HIGH RISK PATIENT  Once          12/25/22 1157     Place sequential compression device  Until discontinued         12/25/22 1157                Paola Mcgraw MD  Cardiology  [facility] - Cardiac Intensive [facility]

## 2022-12-26 NOTE — ASSESSMENT & PLAN NOTE
IABP left in place following LHC for assist.     Plan  - Daily CXR   - Daily coags   - Trend daily LDH   - remove 12/26

## 2022-12-26 NOTE — PROGRESS NOTES
12/25/2022  Neville Haque    Current provider:  Agustin Helms MD    Device interrogation:  No flowsheet data found.       Rounded on Nai Mariejose to ensure all mechanical assist device settings (IABP or VAD) were appropriate and all parameters were within limits.  I was able to ensure all back up equipment was present, the staff had no issues, and the Perfusion Department daily rounding was complete.      For implantable VADs: Interrogation of Ventricular assist device was performed with analysis of device parameters and review of device function. I have personally reviewed the interrogation findings and agree with findings as stated.     In emergency, the nursing units have been notified to contact the perfusion department either by:  Calling [id number] from 630am to 4pm Mon thru Fri, utilizing the On-Call Finder functionality of Epic and searching for Perfusion, or by contacting the hospital  from 4pm to 630am and on weekends and asking to speak with the perfusionist on call.    9:14 PM

## 2022-12-26 NOTE — SUBJECTIVE & OBJECTIVE
nterval History: NAEON. Reporting some aching chest pain different in quality from her initial ischemic chest pain.     Review of Systems   Cardiovascular:  Positive for chest pain.   Musculoskeletal:  Positive for back pain.   Objective:     Vital Signs (Most Recent):  Temp: 97.9 °F (36.6 °C) (12/26/22 1100)  Pulse: 67 (12/26/22 1100)  Resp: (!) 22 (12/26/22 1100)  BP: (!) 145/67 (12/26/22 1100)  SpO2: 98 % (12/26/22 1100)   Vital Signs (24h Range):  Temp:  [97.5 °F (36.4 °C)-99.1 °F (37.3 °C)] 97.9 °F (36.6 °C)  Pulse:  [61-93] 67  Resp:  [13-31] 22  SpO2:  [95 %-100 %] 98 %  BP: (113-157)/(64-98) 145/67     Weight: 51.3 kg (113 lb 1.5 oz)  Body mass index is 18.82 kg/m².    Intake/Output Summary (Last 24 hours) at 12/26/2022 1302  Last data filed at 12/26/2022 0905  Gross per 24 hour   Intake 2808.09 ml   Output 550 ml   Net 2258.09 ml      Physical Exam  Vitals and nursing note reviewed.   Constitutional:       General: She is not in acute distress.     Appearance: She is ill-appearing. She is not toxic-appearing or diaphoretic.   Cardiovascular:      Rate and Rhythm: Normal rate and regular rhythm.      Heart sounds: No murmur heard.     Comments: No JVD appreciated  Pulmonary:      Effort: Pulmonary effort is normal. No respiratory distress.   Abdominal:      Palpations: Abdomen is soft.      Tenderness: There is no guarding.   Musculoskeletal:      Right lower leg: No edema.      Left lower leg: No edema.   Skin:     General: Skin is warm and dry.   Neurological:      Mental Status: She is alert. Mental status is at baseline.      Cranial Nerves: No dysarthria.   Psychiatric:         Mood and Affect: Mood normal.         Behavior: Behavior normal.       Significant Labs: All pertinent labs within the past 24 hours have been reviewed.    Significant Imaging: I have reviewed all pertinent imaging results/findings within the past 24 hours.

## 2022-12-26 NOTE — HOSPITAL COURSE
Admitted to CCU with acute STEMI (inferolateral). Loaded with ASA and brillinta + heparin.  Underwent LHC with 2x MARQUES placed in the RCA on 12/25. Continued to tirofiban for 18h. IABP left in place after cath, removed on 12/26. Continued to have additional chest pain, but it was different in quality from initial ischemic chest pain. Troponins downtrending.

## 2022-12-26 NOTE — ASSESSMENT & PLAN NOTE
61 yo F without significant medical history presenting with severe angina  Inferoposterior STEMI on ecg  Code stemi activated.    [facility] with severe CAD and subtotal occlusion of the RCA requiring x2 MARQUES. IABP placed. Agristat started intraoperatively.     Plan   - Continue Agristat for 18; now off  - Remove IABP today  - DAPT with ticagrelor 6 months and ASA 1 year  - Atorvastatin 80mg; goal LDL <70  - Continuous telemetry   - Morphine 2mg PRN   - Maintain Euvolemia, avoid hypovolemia give RV involvement  - Trend toponin to peak; peaked at >50k, stop tranding  - TSH, A1c, Lipid panel ordered

## 2022-12-26 NOTE — PLAN OF CARE
Cardiac ICU Care Plan    POC reviewed with Nai Pinawalter Mariejose and family. Questions and concerns addressed. Patient had no complaints of chest pain this shift. IABP site found to be draining blood. Pressure held by RN and quick clot applied under sterile dressing. Bleeding under control at this time. No alarms with IABP or complaints from patient. See below and flowsheets for full assessment and VS info.       Neuro:  [place] Coma Scale  Best Eye Response: 4-->(E4) spontaneous  Best Motor Response: 6-->(M6) obeys commands  Best Verbal Response: 5-->(V5) oriented  [place] Coma Scale Score: 15       24 hr Temp:  [97.5 °F (36.4 °C)-98.2 °F (36.8 °C)]      CV:  Rhythm: normal sinus rhythm   DVT prophylaxis:                   IABP Mode: Auto  IABP Rate: 1:1  IABP Trigger: ECG  IABP Augmented Diastolic Pressure: 136          Pulses  Right Radial Pulse: 2+ (normal)  Left Radial Pulse: 2+ (normal)  Right Dorsalis Pedis Pulse: 1+ (weak)  Left Dorsalis Pedis Pulse: 1+ (weak)  Right Posterior Tibial Pulse: 1+ (weak)  Left Posterior Tibial Pulse: 1+ (weak)    Resp:  Flow (L/min): 2       GI/:  GI prophylaxis: no            Intake/Output Summary (Last 24 hours) at 12/25/2022 1918  Last data filed at 12/25/2022 1800  Gross per 24 hour   Intake 2329.47 ml   Output --   Net 2329.47 ml        Nutritional Supplement Intake: None    Labs/Accuchecks:  Recent Labs   Lab 12/25/22  1123   WBC 9.22   RBC 4.71   HGB 15.7   HCT 45.7       No results for input(s): PT, INR, APTT in the last 168 hours.   Recent Labs     12/25/22  1122      K 4.3   CO2 23      BUN 19   CREATININE 1.0   ALKPHOS 66   ALT 21   AST 44*   BILITOT 0.7       Recent Labs   Lab 12/25/22  1122 12/25/22  1548   TROPONINI 1.728* 14.589*    No results for input(s): PH, PCO2, PO2, HCO3, POCSATURATED, BE in the last 72 hours.    Electrolytes: N/A - electrolytes WDL  Accuchecks: ACHS    Gtts/LDAs:   tirofiban-0.9% sodium chloride 12.5 mg/250ml 0.075  mcg/kg/min (12/25/22 1800)       Lines/Drains/Airways       Line  Duration                  IABP 12/25/22 1330 <1 day              Peripheral Intravenous Line  Duration                  Peripheral IV - Single Lumen 03/15/21 0639 20 G Left Hand 650 days         Peripheral IV - Single Lumen 12/25/22 1118 18 G Right Antecubital <1 day         Peripheral IV - Single Lumen 12/25/22 1119 20 G Left Antecubital <1 day                    Skin/Wounds  Bathing/Skin Care: back care;bath, complete;dressed/undressed;electrode patches/site rotation;linen changed (12/25/22 1530)  Wounds: No  Wound care consulted: No

## 2022-12-26 NOTE — PROGRESS NOTES
12/26/2022  Neville Haque    Current provider:  Agustin Helms MD    Device interrogation:  No flowsheet data found.       Rounded on Nai Mariejose to ensure all mechanical assist device settings (IABP or VAD) were appropriate and all parameters were within limits.  I was able to ensure all back up equipment was present, the staff had no issues, and the Perfusion Department daily rounding was complete.      For implantable VADs: Interrogation of Ventricular assist device was performed with analysis of device parameters and review of device function. I have personally reviewed the interrogation findings and agree with findings as stated.     In emergency, the nursing units have been notified to contact the perfusion department either by:  Calling [phone] from 630am to 4pm Mon thru Fri, utilizing the On-Call Finder functionality of Epic and searching for Perfusion, or by contacting the hospital  from 4pm to 630am and on weekends and asking to speak with the perfusionist on call.    10:40 AM

## 2022-12-26 NOTE — SUBJECTIVE & OBJECTIVE
Interval History: NAEON. Tirofiban stopped this morning    Review of Systems   Cardiovascular:  Positive for chest pain.   Musculoskeletal:  Positive for back pain.   Objective:     Vital Signs (Most Recent):  Temp: 97.9 °F (36.6 °C) (12/26/22 1100)  Pulse: 67 (12/26/22 1100)  Resp: (!) 22 (12/26/22 1100)  BP: (!) 145/67 (12/26/22 1100)  SpO2: 98 % (12/26/22 1100)   Vital Signs (24h Range):  Temp:  [97.5 °F (36.4 °C)-99.1 °F (37.3 °C)] 97.9 °F (36.6 °C)  Pulse:  [61-93] 67  Resp:  [13-31] 22  SpO2:  [95 %-100 %] 98 %  BP: (113-157)/(64-98) 145/67     Weight: 51.3 kg (113 lb 1.5 oz)  Body mass index is 18.82 kg/m².     SpO2: 98 %         Intake/Output Summary (Last 24 hours) at 12/26/2022 1310  Last data filed at 12/26/2022 0905  Gross per 24 hour   Intake 2808.09 ml   Output 550 ml   Net 2258.09 ml       Lines/Drains/Airways       Line  Duration                  IABP 12/25/22 1330 <1 day              Peripheral Intravenous Line  Duration                  Peripheral IV - Single Lumen 03/15/21 0639 20 G Left Hand 651 days         Peripheral IV - Single Lumen 12/25/22 1118 18 G Right Antecubital 1 day         Peripheral IV - Single Lumen 12/25/22 1119 20 G Left Antecubital 1 day                    Physical Exam  Vitals and nursing note reviewed.   Constitutional:       General: She is not in acute distress.     Appearance: She is not toxic-appearing or diaphoretic.   Cardiovascular:      Rate and Rhythm: Normal rate and regular rhythm.   Pulmonary:      Effort: Pulmonary effort is normal. No respiratory distress.      Breath sounds: No wheezing.   Abdominal:      Palpations: Abdomen is soft.      Tenderness: There is no guarding.   Musculoskeletal:      Right lower leg: No edema.      Left lower leg: No edema.   Skin:     General: Skin is warm and dry.   Neurological:      Mental Status: She is alert. Mental status is at baseline.      Cranial Nerves: No dysarthria.   Psychiatric:         Mood and Affect: Mood normal.          Behavior: Behavior normal.       Significant Labs: All pertinent lab results from the last 24 hours have been reviewed.    Significant Imaging:  na

## 2022-12-26 NOTE — HOSPITAL COURSE
Admitted to CCU after [facility] where 2x MARQUES were placed to RCA for acute STEMI. IABP left in place to aid with coronary perfusion not ionotropic support. Had been loaded with heparin drip and DAPT with brillinta, tirofiban was also used while on IABP. IABP removed 12/26. With some ongoing chest achiness but this is different in quality from her initial ischemic chest pain. EF 35-40% now after NSTEMI. Brillinta changed to prasugrel due to insurance issues. Stepped down from ICU on 12/27. Feeling well and ready for discharge.

## 2022-12-26 NOTE — HPI
Ms. Nai Boo is a 63 yo F with no known medical history presenting with crushing chest pain at 1030 this AM. She denies any exertional CP in the preceding months but does report an episode of chest pressure at rest yesterday that was similar to today's presentation but less severe. She was in normal state of health noting only calf claudication 2 months ago. She also reports 1-2 months of GAMINO after climbing stairs but considered this to be 2/2 deconditioning. She attends an exercise class once per week and denies any decrease in exercise tolerance in these classes. Today, symptoms were more severe and persistent. Symptoms started around 10-10:30am morning of admission. She was brought to ER by her . She also reports that she is a lifetime non-smoker, does not drink and denies illicit's. Family history pertinent for a brother with MI in 50s, Mother CVA in 90s, Father MI in 70s. She does report increased stress at work after her boss passed away 6 months ago with an increased workload leading to trouble sleeping and panic attacks.      In the ED, patient found to be in significant distress but hemodynamically stable. ECG showed inferior and posterior stemi findings. She was given morpine. Bedside echo showed infero septal hypokinesis with a quick picture. Code stemi called. SHe was loaded with ASA, brilinta, given 5k of heparin. Pt rolled to cath lab emergently after procedure discussed and consents obtained by patient and her . See above.

## 2022-12-26 NOTE — NURSING
12/25/22:  2130-Notified Dr. Pavel Gil of pt's Troponin  >50.000 with previous Troponin-14.589 without changes noted to vital signs or clinical assessment. Pt denies chest pain or discomfort at this time. New orders obtained and implemented. Dr. Naveed Gil reviewed stat 12 lead EKG. No new orders obtained at this time.    12/26/22:    0157-Notified Dr. Pavel Gil of pt's most recent troponin, with pt c/o lower back pain without radiation. Pt denies chest pain and stated that headache has resolved after administration of Tylenol. 12 lead uploaded in Epic for MD's review. Reviewed pt's current vital signs. New orders obtained.

## 2022-12-27 PROBLEM — D69.6 THROMBOCYTOPENIA: Status: ACTIVE | Noted: 2022-12-27

## 2022-12-27 PROBLEM — I50.20 HFREF (HEART FAILURE WITH REDUCED EJECTION FRACTION): Status: ACTIVE | Noted: 2022-12-27

## 2022-12-27 PROBLEM — Z98.890 ON INTRA-AORTIC BALLOON PUMP ASSIST: Status: RESOLVED | Noted: 2022-12-25 | Resolved: 2022-12-27

## 2022-12-27 LAB
ALBUMIN SERPL BCP-MCNC: 3.4 G/DL (ref 3.5–5.2)
ALP SERPL-CCNC: 65 U/L (ref 55–135)
ALT SERPL W/O P-5'-P-CCNC: 29 U/L (ref 10–44)
ANION GAP SERPL CALC-SCNC: 6 MMOL/L (ref 8–16)
ASCENDING AORTA: 3.06 CM
AST SERPL-CCNC: 111 U/L (ref 10–40)
AV INDEX (PROSTH): 0.71
AV MEAN GRADIENT: 2 MMHG
AV PEAK GRADIENT: 3 MMHG
AV VALVE AREA: 2.15 CM2
AV VELOCITY RATIO: 0.64
BASOPHILS # BLD AUTO: 0.03 K/UL (ref 0–0.2)
BASOPHILS NFR BLD: 0.5 % (ref 0–1.9)
BILIRUB SERPL-MCNC: 1.3 MG/DL (ref 0.1–1)
BSA FOR ECHO PROCEDURE: 1.53 M2
BUN SERPL-MCNC: 10 MG/DL (ref 8–23)
CALCIUM SERPL-MCNC: 8.6 MG/DL (ref 8.7–10.5)
CHLORIDE SERPL-SCNC: 109 MMOL/L (ref 95–110)
CO2 SERPL-SCNC: 24 MMOL/L (ref 23–29)
CREAT SERPL-MCNC: 0.7 MG/DL (ref 0.5–1.4)
CV ECHO LV RWT: 0.63 CM
DIFFERENTIAL METHOD: ABNORMAL
DOP CALC AO PEAK VEL: 0.91 M/S
DOP CALC AO VTI: 15.42 CM
DOP CALC LVOT AREA: 3 CM2
DOP CALC LVOT DIAMETER: 1.96 CM
DOP CALC LVOT PEAK VEL: 0.58 M/S
DOP CALC LVOT STROKE VOLUME: 33.14 CM3
DOP CALCLVOT PEAK VEL VTI: 10.99 CM
E WAVE DECELERATION TIME: 203.24 MSEC
E/A RATIO: 0.73
E/E' RATIO: 13.75 M/S
ECHO LV POSTERIOR WALL: 1.07 CM (ref 0.6–1.1)
EJECTION FRACTION: 35 %
EOSINOPHIL # BLD AUTO: 0.1 K/UL (ref 0–0.5)
EOSINOPHIL NFR BLD: 1.4 % (ref 0–8)
ERYTHROCYTE [DISTWIDTH] IN BLOOD BY AUTOMATED COUNT: 12 % (ref 11.5–14.5)
EST. GFR  (NO RACE VARIABLE): >60 ML/MIN/1.73 M^2
FRACTIONAL SHORTENING: 19 % (ref 28–44)
GLUCOSE SERPL-MCNC: 98 MG/DL (ref 70–110)
HCT VFR BLD AUTO: 40.5 % (ref 37–48.5)
HGB BLD-MCNC: 13.6 G/DL (ref 12–16)
IMM GRANULOCYTES # BLD AUTO: 0.01 K/UL (ref 0–0.04)
IMM GRANULOCYTES NFR BLD AUTO: 0.2 % (ref 0–0.5)
INTERVENTRICULAR SEPTUM: 0.93 CM (ref 0.6–1.1)
LA MAJOR: 4.27 CM
LA MINOR: 3.88 CM
LA WIDTH: 3.55 CM
LDH SERPL L TO P-CCNC: 645 U/L (ref 110–260)
LEFT ATRIUM SIZE: 2.26 CM
LEFT ATRIUM VOLUME INDEX: 17.9 ML/M2
LEFT ATRIUM VOLUME: 27.73 CM3
LEFT INTERNAL DIMENSION IN SYSTOLE: 2.75 CM (ref 2.1–4)
LEFT VENTRICLE DIASTOLIC VOLUME INDEX: 30.43 ML/M2
LEFT VENTRICLE DIASTOLIC VOLUME: 47.16 ML
LEFT VENTRICLE MASS INDEX: 64 G/M2
LEFT VENTRICLE SYSTOLIC VOLUME INDEX: 18.3 ML/M2
LEFT VENTRICLE SYSTOLIC VOLUME: 28.33 ML
LEFT VENTRICULAR INTERNAL DIMENSION IN DIASTOLE: 3.39 CM (ref 3.5–6)
LEFT VENTRICULAR MASS: 98.47 G
LV LATERAL E/E' RATIO: 13.75 M/S
LV SEPTAL E/E' RATIO: 13.75 M/S
LYMPHOCYTES # BLD AUTO: 1 K/UL (ref 1–4.8)
LYMPHOCYTES NFR BLD: 14.7 % (ref 18–48)
MAGNESIUM SERPL-MCNC: 2.1 MG/DL (ref 1.6–2.6)
MCH RBC QN AUTO: 32.9 PG (ref 27–31)
MCHC RBC AUTO-ENTMCNC: 33.6 G/DL (ref 32–36)
MCV RBC AUTO: 98 FL (ref 82–98)
MONOCYTES # BLD AUTO: 0.5 K/UL (ref 0.3–1)
MONOCYTES NFR BLD: 7.5 % (ref 4–15)
MV PEAK A VEL: 0.75 M/S
MV PEAK E VEL: 0.55 M/S
MV STENOSIS PRESSURE HALF TIME: 58.94 MS
MV VALVE AREA P 1/2 METHOD: 3.73 CM2
NEUTROPHILS # BLD AUTO: 4.9 K/UL (ref 1.8–7.7)
NEUTROPHILS NFR BLD: 75.7 % (ref 38–73)
NRBC BLD-RTO: 0 /100 WBC
PHOSPHATE SERPL-MCNC: 2.3 MG/DL (ref 2.7–4.5)
PLATELET # BLD AUTO: 115 K/UL (ref 150–450)
PMV BLD AUTO: 10.6 FL (ref 9.2–12.9)
POCT GLUCOSE: 92 MG/DL (ref 70–110)
POTASSIUM SERPL-SCNC: 4.3 MMOL/L (ref 3.5–5.1)
PROT SERPL-MCNC: 5.4 G/DL (ref 6–8.4)
QEF: 38 %
RA MAJOR: 3.94 CM
RA WIDTH: 3.86 CM
RBC # BLD AUTO: 4.13 M/UL (ref 4–5.4)
RIGHT VENTRICULAR END-DIASTOLIC DIMENSION: 2.58 CM
SINUS: 3.53 CM
SODIUM SERPL-SCNC: 139 MMOL/L (ref 136–145)
STJ: 3.19 CM
TDI LATERAL: 0.04 M/S
TDI SEPTAL: 0.04 M/S
TDI: 0.04 M/S
TRICUSPID ANNULAR PLANE SYSTOLIC EXCURSION: 1.38 CM
WBC # BLD AUTO: 6.52 K/UL (ref 3.9–12.7)

## 2022-12-27 PROCEDURE — 94761 N-INVAS EAR/PLS OXIMETRY MLT: CPT

## 2022-12-27 PROCEDURE — 20600001 HC STEP DOWN PRIVATE ROOM

## 2022-12-27 PROCEDURE — 63600175 PHARM REV CODE 636 W HCPCS: Performed by: STUDENT IN AN ORGANIZED HEALTH CARE EDUCATION/TRAINING PROGRAM

## 2022-12-27 PROCEDURE — 80053 COMPREHEN METABOLIC PANEL: CPT | Performed by: STUDENT IN AN ORGANIZED HEALTH CARE EDUCATION/TRAINING PROGRAM

## 2022-12-27 PROCEDURE — 83615 LACTATE (LD) (LDH) ENZYME: CPT

## 2022-12-27 PROCEDURE — 25000003 PHARM REV CODE 250: Performed by: STUDENT IN AN ORGANIZED HEALTH CARE EDUCATION/TRAINING PROGRAM

## 2022-12-27 PROCEDURE — 27000202 HC IABP, ADD'L DAY

## 2022-12-27 PROCEDURE — 25000003 PHARM REV CODE 250: Performed by: INTERNAL MEDICINE

## 2022-12-27 PROCEDURE — 99232 SBSQ HOSP IP/OBS MODERATE 35: CPT | Mod: ,,, | Performed by: INTERNAL MEDICINE

## 2022-12-27 PROCEDURE — 85025 COMPLETE CBC W/AUTO DIFF WBC: CPT | Performed by: STUDENT IN AN ORGANIZED HEALTH CARE EDUCATION/TRAINING PROGRAM

## 2022-12-27 PROCEDURE — 99232 PR SUBSEQUENT HOSPITAL CARE,LEVL II: ICD-10-PCS | Mod: ,,, | Performed by: INTERNAL MEDICINE

## 2022-12-27 PROCEDURE — 25000003 PHARM REV CODE 250

## 2022-12-27 PROCEDURE — 84100 ASSAY OF PHOSPHORUS: CPT | Performed by: STUDENT IN AN ORGANIZED HEALTH CARE EDUCATION/TRAINING PROGRAM

## 2022-12-27 PROCEDURE — 25500020 PHARM REV CODE 255: Performed by: INTERNAL MEDICINE

## 2022-12-27 PROCEDURE — 83735 ASSAY OF MAGNESIUM: CPT | Performed by: STUDENT IN AN ORGANIZED HEALTH CARE EDUCATION/TRAINING PROGRAM

## 2022-12-27 RX ORDER — PRASUGREL 10 MG/1
60 TABLET, FILM COATED ORAL ONCE
Status: COMPLETED | OUTPATIENT
Start: 2022-12-27 | End: 2022-12-27

## 2022-12-27 RX ORDER — ENOXAPARIN SODIUM 100 MG/ML
40 INJECTION SUBCUTANEOUS EVERY 24 HOURS
Status: DISCONTINUED | OUTPATIENT
Start: 2022-12-27 | End: 2022-12-28 | Stop reason: HOSPADM

## 2022-12-27 RX ORDER — PRASUGREL 5 MG/1
5 TABLET, FILM COATED ORAL DAILY
Qty: 30 TABLET | Refills: 11 | Status: SHIPPED | OUTPATIENT
Start: 2022-12-28 | End: 2022-12-28 | Stop reason: SDUPTHER

## 2022-12-27 RX ORDER — PRASUGREL 5 MG/1
5 TABLET, FILM COATED ORAL DAILY
Status: DISCONTINUED | OUTPATIENT
Start: 2022-12-28 | End: 2022-12-28 | Stop reason: HOSPADM

## 2022-12-27 RX ORDER — SODIUM,POTASSIUM PHOSPHATES 280-250MG
2 POWDER IN PACKET (EA) ORAL ONCE
Status: COMPLETED | OUTPATIENT
Start: 2022-12-27 | End: 2022-12-27

## 2022-12-27 RX ORDER — NAPROXEN SODIUM 220 MG/1
81 TABLET, FILM COATED ORAL DAILY
Qty: 90 TABLET | Refills: 3 | Status: CANCELLED | OUTPATIENT
Start: 2022-12-28 | End: 2023-12-28

## 2022-12-27 RX ORDER — ATORVASTATIN CALCIUM 80 MG/1
80 TABLET, FILM COATED ORAL DAILY
Qty: 90 TABLET | Refills: 3 | Status: CANCELLED | OUTPATIENT
Start: 2022-12-28 | End: 2023-12-28

## 2022-12-27 RX ADMIN — VALSARTAN 20 MG: 40 TABLET, FILM COATED ORAL at 08:12

## 2022-12-27 RX ADMIN — ACETAMINOPHEN 650 MG: 325 TABLET ORAL at 09:12

## 2022-12-27 RX ADMIN — METOPROLOL TARTRATE 25 MG: 25 TABLET, FILM COATED ORAL at 08:12

## 2022-12-27 RX ADMIN — ENOXAPARIN SODIUM 40 MG: 100 INJECTION SUBCUTANEOUS at 04:12

## 2022-12-27 RX ADMIN — TICAGRELOR 90 MG: 90 TABLET ORAL at 08:12

## 2022-12-27 RX ADMIN — ATORVASTATIN CALCIUM 80 MG: 40 TABLET, FILM COATED ORAL at 08:12

## 2022-12-27 RX ADMIN — HUMAN ALBUMIN MICROSPHERES AND PERFLUTREN 0.66 MG: 10; .22 INJECTION, SOLUTION INTRAVENOUS at 11:12

## 2022-12-27 RX ADMIN — POTASSIUM & SODIUM PHOSPHATES POWDER PACK 280-160-250 MG 2 PACKET: 280-160-250 PACK at 06:12

## 2022-12-27 RX ADMIN — PRASUGREL 60 MG: 10 TABLET, FILM COATED ORAL at 08:12

## 2022-12-27 RX ADMIN — ASPIRIN 81 MG: 81 TABLET, CHEWABLE ORAL at 08:12

## 2022-12-27 NOTE — ASSESSMENT & PLAN NOTE
EF 35-40% now after STEMI:   The left ventricle is normal in size with concentric hypertrophy and moderately decreased systolic function.   There are segmental left ventricular wall motion abnormalities.   The estimated ejection fraction is 35-40%.   Grade I left ventricular diastolic dysfunction.   Small circumferential pericardial effusion.   Normal right ventricular size with normal right ventricular systolic function.   The quantitatively derived ejection fraction is 38%.   Indeterminate central venous pressure.    - will need GDMT initiation   - start low-dose valsartan 20mg BID, uptitrate as tolerated   - continue tartrate 25mg BID, plan to transition to succinate  - ambulatory referral to cardiac rehab

## 2022-12-27 NOTE — NURSING TRANSFER
Nursing Transfer Note      12/27/2022     Reason patient is being transferred: Stepdown    Transfer To: 319    Transfer via wheelchair    Transfer with cardiac monitoring, pt belongings    Transported by Ze Ochoa RN    Medicines sent: none    Chart send with patient: Yes    Notified: spouse w/ patient    Upon arrival to floor: accepting RN at bedside, patient oriented to room, call bell in reach, and bed in lowest position

## 2022-12-27 NOTE — PLAN OF CARE
"      PLAN OF CARE NOTE    Dx: STEMI (ST elevation myocardial infarction), post-IABP removal    Shift Events: Pt's V/S stable over night, no acute events over shift. Pt able to rest well over shift, no complaints of pain over shift. Pt able to ambulate to toilet once over night, tolerated well. Plan to repeat TTE today, possible stepdown / discharge today. POC reviewed with pt and pt's spouse, all questions answered and encouraged.     Goals of Care: MAP >65, SBP <180    Neuro: AAO x4, Follows Commands, and Moves All Extremities    Vital Signs: /61 (BP Location: Right arm, Patient Position: Lying)   Pulse 69   Temp 98.5 °F (36.9 °C) (Oral)   Resp 18   Ht 5' 5" (1.651 m)   Wt 51.3 kg (113 lb 1.5 oz)   LMP 03/11/2021   SpO2 96%   BMI 18.82 kg/m²     Cardiac: NSR, HR 60-80    Respiratory: Room Air    Diet: Cardiac Diet    Urine Output: Voids Spontaneously, x1 urine occurrence over shift     Labs: daily, 2 packets of Phos given PO / Accuchecks: ACHS    Skin: No skin breakdown noted over shift. Pt's R groin site remains CDI over shift. Pt able to reposition independently, pillow support provided. Skin foams in place for prevention. [place] bed plugged in, inflated, and working properly.     "

## 2022-12-27 NOTE — ASSESSMENT & PLAN NOTE
Mild TCP to 110-115 this admission    - continue to monitor; continue antiplatelet agents at this time as benefit>risk

## 2022-12-27 NOTE — PLAN OF CARE
[facility] - Cardiac Intensive Care  Initial Discharge Assessment       Primary Care Provider: Jaycee Barriga MD    Admission Diagnosis: Chest pain [R07.9]  ST elevation myocardial infarction (STEMI), unspecified artery [I21.3]    Admission Date: 12/25/2022  Expected Discharge Date: 12/27/2022    Discharge Barriers Identified: None    Payor: BLUE CROSS BLUE SHIELD / Plan: BCBS BLUE SAVER PPO - HD / Product Type: PPO /     Extended Emergency Contact Information  Primary Emergency Contact: Guillaume Boo  Address: [address] [place] [address] [facility]  Home Phone: [phone]  Mobile Phone: [phone]  Relation: Spouse    Discharge Plan A: Home with family  Discharge Plan B: Home      [id number] - [place], [place] - [address] AT [facility] & [address]  [address]  [facility] [address]  Phone: [phone] Fax: [phone]      Initial Assessment (most recent)       Adult Discharge Assessment - 12/27/22 1128          Discharge Assessment    Assessment Type Discharge Planning Assessment     Confirmed/corrected address, phone number and insurance Yes     Confirmed Demographics Correct on Facesheet     Source of Information patient;family;health record     Communicated TIM with patient/caregiver Yes     Reason For Admission chest pain     People in Home significant other     Facility Arrived From: home     Do you expect to return to your current living situation? Yes     Do you have help at home or someone to help you manage your care at home? Yes     Who are your caregiver(s) and their phone number(s)? Guillaume Boo () [phone]     Prior to hospitilization cognitive status: Alert/Oriented     Current cognitive status: Alert/Oriented     Home Layout Able to live on 1st floor     Readmission within 30 days? No     Patient currently being followed by outpatient case management? No     Do you currently have service(s) that  help you manage your care at home? No     Do you take prescription medications? No     Who is going to help you get home at discharge? Guillaume Boo () [phone]     How do you get to doctors appointments? car, drives self     Are you on dialysis? No     Do you take coumadin? No     Discharge Plan A Home with family     Discharge Plan B Home     DME Needed Upon Discharge  none     Discharge Plan discussed with: Spouse/sig other;Patient     Name(s) and Number(s) Guillaume Boo () [phone]     Discharge Barriers Identified None                   SW met with pt and  at bedside to discuss discharge planning.  Pt lives with her  in a one-story house and is independent with ambulation and ADLs.  Pt will have transportation and assistance from her  at bedside.  Anticipate discharge home with no needs.  SW name and ext on whiteboard; discharge planning booklet provided.  Will continue to follow.    Edita Yadav LMSW  [facility]  [id number]

## 2022-12-27 NOTE — SUBJECTIVE & OBJECTIVE
Interval History: NAEON. Tolerated removal of balloon pump well. EF returned moderately decreased at 35-40%. Stepped down from ICU.     Review of Systems   Constitutional: Negative for chills and diaphoresis.   Cardiovascular:  Positive for chest pain (achiness not same as initial ischemic pain). Negative for palpitations.   Respiratory:  Negative for cough and shortness of breath.    Gastrointestinal:  Negative for nausea and vomiting.   Neurological:  Negative for dizziness.   Objective:     Vital Signs (Most Recent):  Temp: 98 °F (36.7 °C) (12/27/22 1205)  Pulse: 86 (12/27/22 1305)  Resp: (!) 25 (12/27/22 1305)  BP: (!) 109/54 (12/27/22 1305)  SpO2: 100 % (12/27/22 1305)   Vital Signs (24h Range):  Temp:  [98 °F (36.7 °C)-98.7 °F (37.1 °C)] 98 °F (36.7 °C)  Pulse:  [66-97] 86  Resp:  [15-35] 25  SpO2:  [95 %-100 %] 100 %  BP: ()/(54-73) 109/54     Weight: 51.3 kg (113 lb)  Body mass index is 18.8 kg/m².     SpO2: 100 %         Intake/Output Summary (Last 24 hours) at 12/27/2022 1325  Last data filed at 12/27/2022 1305  Gross per 24 hour   Intake 980 ml   Output 1000 ml   Net -20 ml       Lines/Drains/Airways       Peripheral Intravenous Line  Duration                  Peripheral IV - Single Lumen 12/25/22 1118 18 G Right Antecubital 2 days         Peripheral IV - Single Lumen 12/25/22 1119 20 G Left Antecubital 2 days                    Physical Exam  Vitals and nursing note reviewed.   Constitutional:       General: She is not in acute distress.     Appearance: She is not toxic-appearing or diaphoretic.   Cardiovascular:      Rate and Rhythm: Normal rate and regular rhythm.      Heart sounds: No murmur heard.  Pulmonary:      Effort: Pulmonary effort is normal. No respiratory distress.      Breath sounds: No wheezing.   Abdominal:      Palpations: Abdomen is soft.      Tenderness: There is no guarding.   Musculoskeletal:      Right lower leg: No edema.      Left lower leg: No edema.   Skin:     General:  Skin is warm and dry.   Neurological:      Mental Status: She is alert. Mental status is at baseline.      Cranial Nerves: No dysarthria.   Psychiatric:         Mood and Affect: Mood normal.         Behavior: Behavior normal.       Significant Labs: All pertinent lab results from the last 24 hours have been reviewed.    Significant Imaging:  na

## 2022-12-27 NOTE — PROGRESS NOTES
12/27/22 1437   Vital Signs   Temp 98.3 °F (36.8 °C)   Temp src Oral   Pulse 77   Heart Rate Source Monitor   Resp 18   SpO2 95 %   Pulse Oximetry Type Intermittent   Device (Oxygen Therapy) room air   BP (!) 110/54   MAP (mmHg) 77   BP Location Left arm   BP Method Automatic   Patient Position Lying        Cardiac/Telemetry Details / Alarms   Cardiac/Telemetry Monitor On Yes   Cardiac/Telemetry Audible Yes   Cardiac/Telemetry Alarms Set Yes   Assessments (Pre/Post)   Level of Consciousness (AVPU) alert       Patient admitted to CSU. Patient arrived to floor from CICU no evidence of distress; patient AAO x4 at this time. Patient placed on tele. Vital signs obtained. Patient voices no complaints at this time. Plan of care initiated with patient. Bed in lowest position, locked, SR up x2, call bell in reach. Will continue to monitor patient throughout my shift.

## 2022-12-27 NOTE — ASSESSMENT & PLAN NOTE
Not on medications for this at home  Pressures soft inpatient    - will be on GDMT as tolerated after discharge

## 2022-12-27 NOTE — PLAN OF CARE
Problem: Adult Inpatient Plan of Care  Goal: Patient-Specific Goal (Individualized)  Outcome: Ongoing, Progressing  Flowsheets (Taken 12/27/2022 7585)  Anxieties, Fears or Concerns: None voiced  Individualized Care Needs:   Monitor HR. Monitor R groin site. Pt to be D/C 12/28. Maintain BG protocol; achs  Patient-Specific Goals (Include Timeframe): Pt will be free of falls and injuries throughout my shift.

## 2022-12-27 NOTE — ASSESSMENT & PLAN NOTE
63 yo F without significant medical history presenting with severe angina  Inferoposterior STEMI on ecg  Code stemi activated.    [facility] with severe CAD and subtotal occlusion of the RCA requiring x2 MARQUES. IABP placed. Agristat started intraoperatively.      The left ventricle is normal in size with concentric hypertrophy and moderately decreased systolic function.   There are segmental left ventricular wall motion abnormalities.   The estimated ejection fraction is 35-40%.   Grade I left ventricular diastolic dysfunction.   Small circumferential pericardial effusion.   Normal right ventricular size with normal right ventricular systolic function.   The quantitatively derived ejection fraction is 38%.   Indeterminate central venous pressure.    Plan   - now s/p IABP removal  - DAPT with prasugrel x6 months and ASA 1 year  - Atorvastatin 80mg; goal LDL <70  - Continuous telemetry   - Morphine 2mg PRN   - Maintain Euvolemia, avoid hypovolemia give RV involvement  - Troponin peaked at >50k, stop trending

## 2022-12-27 NOTE — PLAN OF CARE
Cardiac ICU Care Plan    POC reviewed with Nai Boo and family. Questions and concerns addressed. No acute events today. IABP removed, pt tolerated well. Bedrest complete, pt ambulated in room w/ standby assist. Pt progressing toward goals. Will continue to monitor. See below and flowsheets for full assessment and VS info.       Neuro:  [place] Coma Scale  Best Eye Response: 4-->(E4) spontaneous  Best Motor Response: 6-->(M6) obeys commands  Best Verbal Response: 5-->(V5) oriented  [place] Coma Scale Score: 15  Assessment Qualifiers: patient not sedated/intubated       24 hr Temp:  [97.5 °F (36.4 °C)-99.1 °F (37.3 °C)]      CV:  Rhythm: normal sinus rhythm   DVT prophylaxis: VTE Required Core Measure: Pharmacological prophylaxis initiated/maintained                 IABP Mode: Auto  IABP Rate: 1:1  IABP Trigger: ECG  IABP Augmented Diastolic Pressure: 133          Pulses  Right Radial Pulse: 2+ (normal)  Left Radial Pulse: 2+ (normal)  Right Dorsalis Pedis Pulse: 2+ (normal)  Left Dorsalis Pedis Pulse: 2+ (normal)  Right Posterior Tibial Pulse: 1+ (weak)  Left Posterior Tibial Pulse: 1+ (weak)    Resp:  Flow (L/min): 2       GI/:  GI prophylaxis: no  Diet/Nutrition Received: 2 gram sodium, low saturated fat/low cholesterol  Last Bowel Movement: 12/25/22  Voiding Characteristics: voids spontaneously without difficulty   Intake/Output Summary (Last 24 hours) at 12/26/2022 1922  Last data filed at 12/26/2022 1505  Gross per 24 hour   Intake 714.01 ml   Output 1150 ml   Net -435.99 ml        Nutritional Supplement Intake: Quantity 0,    Labs/Accuchecks:  Recent Labs   Lab 12/25/22  1123 12/26/22  0312   WBC 9.22 7.91   RBC 4.71 4.10   HGB 15.7 12.9   HCT 45.7 39.2    110*    No results for input(s): PT, INR, APTT in the last 168 hours.   Recent Labs     12/26/22  0312 12/26/22  0831    140   K 3.7 4.4   CO2 24 27    107   BUN 16 15   CREATININE 0.7 0.7   ALKPHOS 58  --    ALT 34  --     *  --    BILITOT 0.8  --        Recent Labs   Lab 12/25/22 2033 12/26/22  0010 12/26/22  0831   TROPONINI >50.000* >50.000* 41.462*    No results for input(s): PH, PCO2, PO2, HCO3, POCSATURATED, BE in the last 72 hours.    Electrolytes: N/A - electrolytes WDL  Accuchecks: none    Gtts/LDAs:      Lines/Drains/Airways       Line  Duration                  IABP 12/25/22 1330 1 day              Peripheral Intravenous Line  Duration                  Peripheral IV - Single Lumen 03/15/21 0639 20 G Left Hand 651 days         Peripheral IV - Single Lumen 12/25/22 1118 18 G Right Antecubital 1 day         Peripheral IV - Single Lumen 12/25/22 1119 20 G Left Antecubital 1 day                    Skin/Wounds  Bathing/Skin Care: back care;bath, complete;dressed/undressed;electrode patches/site rotation;linen changed (12/25/22 1530)  Wounds: Yes  Wound care consulted: No

## 2022-12-27 NOTE — PROGRESS NOTES
12/27/2022  [place]    Current provider:  Agustin Helms MD    Device interrogation:  No flowsheet data found.       Rounded on Nai Mariejose to ensure all mechanical assist device settings (IABP or VAD) were appropriate and all parameters were within limits.  I was able to ensure all back up equipment was present, the staff had no issues, and the Perfusion Department daily rounding was complete.      For implantable VADs: Interrogation of Ventricular assist device was performed with analysis of device parameters and review of device function. I have personally reviewed the interrogation findings and agree with findings as stated.     In emergency, the nursing units have been notified to contact the perfusion department either by:  Calling [phone] from 630am to 4pm Mon thru Fri, utilizing the On-Call Finder functionality of Epic and searching for Perfusion, or by contacting the hospital  from 4pm to 630am and on weekends and asking to speak with the perfusionist on call.    7:08 AM

## 2022-12-27 NOTE — PROGRESS NOTES
[facility] - Cardiology Stepdown  Cardiology  Progress Note    Patient Name: Nai Boo  MRN: [id number]  Admission Date: 12/25/2022  Hospital Length of Stay: 2 days  Code Status: Full Code   Attending Physician: Wlii Best MD   Primary Care Physician: Jaycee Barriga MD  Expected Discharge Date: 12/28/2022  Principal Problem:STEMI (ST elevation myocardial infarction)    Subjective:     Hospital Course:   Admitted to CCU after [facility] where 2x MARQUES were placed to RCA for acute STEMI. IABP left in place to aid with coronary perfusion not ionotropic support. Had been loaded with heparin drip and DAPT with brillinta, tirofiban was also used while on IABP. IABP removed 12/26. With some ongoing chest achiness but this is different in quality from her initial ischemic chest pain. EF 35-40% now after NSTEMI. Brillinta changed to prasugrel due to insurance issues. Stepped down from ICU on 12/27.       Interval History: NAEON. Tolerated removal of balloon pump well. EF returned moderately decreased at 35-40%. Stepped down from ICU.     Review of Systems   Constitutional: Negative for chills and diaphoresis.   Cardiovascular:  Positive for chest pain (achiness not same as initial ischemic pain). Negative for palpitations.   Respiratory:  Negative for cough and shortness of breath.    Gastrointestinal:  Negative for nausea and vomiting.   Neurological:  Negative for dizziness.   Objective:     Vital Signs (Most Recent):  Temp: 98 °F (36.7 °C) (12/27/22 1205)  Pulse: 86 (12/27/22 1305)  Resp: (!) 25 (12/27/22 1305)  BP: (!) 109/54 (12/27/22 1305)  SpO2: 100 % (12/27/22 1305)   Vital Signs (24h Range):  Temp:  [98 °F (36.7 °C)-98.7 °F (37.1 °C)] 98 °F (36.7 °C)  Pulse:  [66-97] 86  Resp:  [15-35] 25  SpO2:  [95 %-100 %] 100 %  BP: ()/(54-73) 109/54     Weight: 51.3 kg (113 lb)  Body mass index is 18.8 kg/m².     SpO2: 100 %         Intake/Output Summary (Last 24 hours) at 12/27/2022 1833  Last data filed at  12/27/2022 1305  Gross per 24 hour   Intake 980 ml   Output 1000 ml   Net -20 ml       Lines/Drains/Airways       Peripheral Intravenous Line  Duration                  Peripheral IV - Single Lumen 12/25/22 1118 18 G Right Antecubital 2 days         Peripheral IV - Single Lumen 12/25/22 1119 20 G Left Antecubital 2 days                    Physical Exam  Vitals and nursing note reviewed.   Constitutional:       General: She is not in acute distress.     Appearance: She is not toxic-appearing or diaphoretic.   Cardiovascular:      Rate and Rhythm: Normal rate and regular rhythm.      Heart sounds: No murmur heard.  Pulmonary:      Effort: Pulmonary effort is normal. No respiratory distress.      Breath sounds: No wheezing.   Abdominal:      Palpations: Abdomen is soft.      Tenderness: There is no guarding.   Musculoskeletal:      Right lower leg: No edema.      Left lower leg: No edema.   Skin:     General: Skin is warm and dry.   Neurological:      Mental Status: She is alert. Mental status is at baseline.      Cranial Nerves: No dysarthria.   Psychiatric:         Mood and Affect: Mood normal.         Behavior: Behavior normal.       Significant Labs: All pertinent lab results from the last 24 hours have been reviewed.    Significant Imaging:  na    Assessment and Plan:     * STEMI (ST elevation myocardial infarction)  63 yo F without significant medical history presenting with severe angina  Inferoposterior STEMI on ecg  Code stemi activated.    [facility] with severe CAD and subtotal occlusion of the RCA requiring x2 MARQUES. IABP placed. Agristat started intraoperatively.      The left ventricle is normal in size with concentric hypertrophy and moderately decreased systolic function.   There are segmental left ventricular wall motion abnormalities.   The estimated ejection fraction is 35-40%.   Grade I left ventricular diastolic dysfunction.   Small circumferential pericardial effusion.   Normal right ventricular size  with normal right ventricular systolic function.   The quantitatively derived ejection fraction is 38%.   Indeterminate central venous pressure.    Plan   - now s/p IABP removal  - DAPT with prasugrel x6 months and ASA 1 year  - Atorvastatin 80mg; goal LDL <70  - Continuous telemetry   - Morphine 2mg PRN   - Maintain Euvolemia, avoid hypovolemia give RV involvement  - Troponin peaked at >50k, stop trending    HFrEF (heart failure with reduced ejection fraction)  EF 35-40% now after STEMI:   The left ventricle is normal in size with concentric hypertrophy and moderately decreased systolic function.   There are segmental left ventricular wall motion abnormalities.   The estimated ejection fraction is 35-40%.   Grade I left ventricular diastolic dysfunction.   Small circumferential pericardial effusion.   Normal right ventricular size with normal right ventricular systolic function.   The quantitatively derived ejection fraction is 38%.   Indeterminate central venous pressure.    - will need GDMT initiation   - start low-dose valsartan 20mg BID, uptitrate as tolerated   - continue tartrate 25mg BID, plan to transition to succinate  - ambulatory referral to cardiac rehab    Thrombocytopenia  Mild TCP to 110-115 this admission    - continue to monitor; continue antiplatelet agents at this time as benefit>risk    Essential hypertension  Not on medications for this at home  Pressures soft inpatient    - will be on GDMT as tolerated after discharge        VTE Risk Mitigation (From admission, onward)         Ordered     enoxaparin injection 40 mg  Daily         12/27/22 1529     IP VTE HIGH RISK PATIENT  Once         12/25/22 1157     Place sequential compression device  Until discontinued         12/25/22 1157                Paola Mcgraw MD  Cardiology  [facility] - Card[facility] Stepdown

## 2022-12-28 ENCOUNTER — TELEPHONE (OUTPATIENT)
Dept: CARDIAC REHAB | Facility: CLINIC | Age: 62
End: 2022-12-28
Payer: COMMERCIAL

## 2022-12-28 ENCOUNTER — TELEPHONE (OUTPATIENT)
Dept: CARDIAC REHAB | Facility: CLINIC | Age: 62
End: 2022-12-28

## 2022-12-28 VITALS
TEMPERATURE: 99 F | RESPIRATION RATE: 18 BRPM | DIASTOLIC BLOOD PRESSURE: 59 MMHG | OXYGEN SATURATION: 96 % | BODY MASS INDEX: 18.52 KG/M2 | HEIGHT: 65 IN | SYSTOLIC BLOOD PRESSURE: 108 MMHG | HEART RATE: 78 BPM | WEIGHT: 111.13 LBS

## 2022-12-28 LAB
ALBUMIN SERPL BCP-MCNC: 3.3 G/DL (ref 3.5–5.2)
ALP SERPL-CCNC: 66 U/L (ref 55–135)
ALT SERPL W/O P-5'-P-CCNC: 24 U/L (ref 10–44)
ANION GAP SERPL CALC-SCNC: 9 MMOL/L (ref 8–16)
AST SERPL-CCNC: 61 U/L (ref 10–40)
BASOPHILS # BLD AUTO: 0.03 K/UL (ref 0–0.2)
BASOPHILS NFR BLD: 0.4 % (ref 0–1.9)
BILIRUB SERPL-MCNC: 1.2 MG/DL (ref 0.1–1)
BUN SERPL-MCNC: 14 MG/DL (ref 8–23)
CALCIUM SERPL-MCNC: 8.8 MG/DL (ref 8.7–10.5)
CHLORIDE SERPL-SCNC: 108 MMOL/L (ref 95–110)
CO2 SERPL-SCNC: 22 MMOL/L (ref 23–29)
CREAT SERPL-MCNC: 0.8 MG/DL (ref 0.5–1.4)
DIFFERENTIAL METHOD: ABNORMAL
EOSINOPHIL # BLD AUTO: 0.2 K/UL (ref 0–0.5)
EOSINOPHIL NFR BLD: 2 % (ref 0–8)
ERYTHROCYTE [DISTWIDTH] IN BLOOD BY AUTOMATED COUNT: 12 % (ref 11.5–14.5)
EST. GFR  (NO RACE VARIABLE): >60 ML/MIN/1.73 M^2
GLUCOSE SERPL-MCNC: 94 MG/DL (ref 70–110)
HCT VFR BLD AUTO: 40.7 % (ref 37–48.5)
HGB BLD-MCNC: 13.8 G/DL (ref 12–16)
IMM GRANULOCYTES # BLD AUTO: 0.01 K/UL (ref 0–0.04)
IMM GRANULOCYTES NFR BLD AUTO: 0.1 % (ref 0–0.5)
LDH SERPL L TO P-CCNC: 616 U/L (ref 110–260)
LYMPHOCYTES # BLD AUTO: 0.9 K/UL (ref 1–4.8)
LYMPHOCYTES NFR BLD: 10.5 % (ref 18–48)
MAGNESIUM SERPL-MCNC: 1.9 MG/DL (ref 1.6–2.6)
MCH RBC QN AUTO: 32.4 PG (ref 27–31)
MCHC RBC AUTO-ENTMCNC: 33.9 G/DL (ref 32–36)
MCV RBC AUTO: 96 FL (ref 82–98)
MONOCYTES # BLD AUTO: 0.5 K/UL (ref 0.3–1)
MONOCYTES NFR BLD: 6.7 % (ref 4–15)
NEUTROPHILS # BLD AUTO: 6.5 K/UL (ref 1.8–7.7)
NEUTROPHILS NFR BLD: 80.3 % (ref 38–73)
NRBC BLD-RTO: 0 /100 WBC
PHOSPHATE SERPL-MCNC: 2.4 MG/DL (ref 2.7–4.5)
PLATELET # BLD AUTO: 111 K/UL (ref 150–450)
PMV BLD AUTO: 11.1 FL (ref 9.2–12.9)
POCT GLUCOSE: 93 MG/DL (ref 70–110)
POCT GLUCOSE: 94 MG/DL (ref 70–110)
POTASSIUM SERPL-SCNC: 4.1 MMOL/L (ref 3.5–5.1)
PROT SERPL-MCNC: 5.9 G/DL (ref 6–8.4)
RBC # BLD AUTO: 4.26 M/UL (ref 4–5.4)
SODIUM SERPL-SCNC: 139 MMOL/L (ref 136–145)
WBC # BLD AUTO: 8.1 K/UL (ref 3.9–12.7)

## 2022-12-28 PROCEDURE — 99239 PR HOSPITAL DISCHARGE DAY,>30 MIN: ICD-10-PCS | Mod: ,,, | Performed by: INTERNAL MEDICINE

## 2022-12-28 PROCEDURE — 25000003 PHARM REV CODE 250: Performed by: STUDENT IN AN ORGANIZED HEALTH CARE EDUCATION/TRAINING PROGRAM

## 2022-12-28 PROCEDURE — 83615 LACTATE (LD) (LDH) ENZYME: CPT

## 2022-12-28 PROCEDURE — 94761 N-INVAS EAR/PLS OXIMETRY MLT: CPT

## 2022-12-28 PROCEDURE — 85025 COMPLETE CBC W/AUTO DIFF WBC: CPT | Performed by: STUDENT IN AN ORGANIZED HEALTH CARE EDUCATION/TRAINING PROGRAM

## 2022-12-28 PROCEDURE — 83735 ASSAY OF MAGNESIUM: CPT | Performed by: STUDENT IN AN ORGANIZED HEALTH CARE EDUCATION/TRAINING PROGRAM

## 2022-12-28 PROCEDURE — 25000003 PHARM REV CODE 250: Performed by: INTERNAL MEDICINE

## 2022-12-28 PROCEDURE — 99239 HOSP IP/OBS DSCHRG MGMT >30: CPT | Mod: ,,, | Performed by: INTERNAL MEDICINE

## 2022-12-28 PROCEDURE — 25000003 PHARM REV CODE 250

## 2022-12-28 PROCEDURE — 84100 ASSAY OF PHOSPHORUS: CPT | Performed by: STUDENT IN AN ORGANIZED HEALTH CARE EDUCATION/TRAINING PROGRAM

## 2022-12-28 PROCEDURE — 80053 COMPREHEN METABOLIC PANEL: CPT | Performed by: STUDENT IN AN ORGANIZED HEALTH CARE EDUCATION/TRAINING PROGRAM

## 2022-12-28 PROCEDURE — 36415 COLL VENOUS BLD VENIPUNCTURE: CPT

## 2022-12-28 RX ORDER — SPIRONOLACTONE 25 MG/1
25 TABLET ORAL DAILY
Qty: 30 TABLET | Refills: 11 | Status: SHIPPED | OUTPATIENT
Start: 2022-12-28 | End: 2023-12-21

## 2022-12-28 RX ORDER — PRASUGREL 5 MG/1
5 TABLET, FILM COATED ORAL DAILY
Qty: 30 TABLET | Refills: 11 | Status: SHIPPED | OUTPATIENT
Start: 2022-12-28 | End: 2023-04-17

## 2022-12-28 RX ORDER — METOPROLOL SUCCINATE 50 MG/1
50 TABLET, EXTENDED RELEASE ORAL DAILY
Qty: 30 TABLET | Refills: 11 | Status: SHIPPED | OUTPATIENT
Start: 2022-12-28 | End: 2023-10-19 | Stop reason: SDUPTHER

## 2022-12-28 RX ORDER — VALSARTAN 40 MG/1
40 TABLET ORAL DAILY
Qty: 90 TABLET | Refills: 3 | Status: SHIPPED | OUTPATIENT
Start: 2022-12-28 | End: 2023-12-21

## 2022-12-28 RX ORDER — VALSARTAN 40 MG/1
40 TABLET ORAL DAILY
Status: DISCONTINUED | OUTPATIENT
Start: 2022-12-28 | End: 2022-12-28 | Stop reason: HOSPADM

## 2022-12-28 RX ORDER — METOPROLOL SUCCINATE 50 MG/1
50 TABLET, EXTENDED RELEASE ORAL DAILY
Status: DISCONTINUED | OUTPATIENT
Start: 2022-12-28 | End: 2022-12-28 | Stop reason: HOSPADM

## 2022-12-28 RX ORDER — SODIUM,POTASSIUM PHOSPHATES 280-250MG
2 POWDER IN PACKET (EA) ORAL ONCE
Status: COMPLETED | OUTPATIENT
Start: 2022-12-28 | End: 2022-12-28

## 2022-12-28 RX ORDER — ASPIRIN 81 MG/1
81 TABLET ORAL DAILY
Qty: 30 TABLET | Refills: 11 | Status: SHIPPED | OUTPATIENT
Start: 2022-12-28 | End: 2023-10-20

## 2022-12-28 RX ORDER — ATORVASTATIN CALCIUM 80 MG/1
80 TABLET, FILM COATED ORAL DAILY
Qty: 90 TABLET | Refills: 3 | Status: SHIPPED | OUTPATIENT
Start: 2022-12-28 | End: 2023-04-17

## 2022-12-28 RX ORDER — SPIRONOLACTONE 25 MG/1
25 TABLET ORAL DAILY
Status: DISCONTINUED | OUTPATIENT
Start: 2022-12-28 | End: 2022-12-28 | Stop reason: HOSPADM

## 2022-12-28 RX ADMIN — PRASUGREL 5 MG: 5 TABLET, FILM COATED ORAL at 09:12

## 2022-12-28 RX ADMIN — ASPIRIN 81 MG: 81 TABLET, CHEWABLE ORAL at 08:12

## 2022-12-28 RX ADMIN — SPIRONOLACTONE 25 MG: 25 TABLET, FILM COATED ORAL at 08:12

## 2022-12-28 RX ADMIN — POTASSIUM & SODIUM PHOSPHATES POWDER PACK 280-160-250 MG 2 PACKET: 280-160-250 PACK at 08:12

## 2022-12-28 RX ADMIN — ATORVASTATIN CALCIUM 80 MG: 40 TABLET, FILM COATED ORAL at 08:12

## 2022-12-28 RX ADMIN — METOPROLOL SUCCINATE 50 MG: 50 TABLET, EXTENDED RELEASE ORAL at 08:12

## 2022-12-28 RX ADMIN — VALSARTAN 40 MG: 40 TABLET, FILM COATED ORAL at 08:12

## 2022-12-28 NOTE — PLAN OF CARE
[facility] - Cardiology Stepdown  Discharge Final Note    Primary Care Provider: Jaycee Barriga MD    Expected Discharge Date: 12/28/2022    Final Discharge Note (most recent)       Final Note - 12/28/22 1317          Final Note    Assessment Type Final Discharge Note     Anticipated Discharge Disposition Home or Self Care     Hospital Resources/Appts/Education Provided Appointments scheduled and added to AVS                   Edita Yadav LMSW  [facility]  [id number]      Future Appointments   Date Time Provider Department Center   1/9/2023  9:00 AM Agustin Helms MD [facility] CARDIO [facility]       Contact Info       [facility] - Cardiology - 3rd Fl   Specialty: Cardiology    [address]  [facility] [address]   Phone: [phone]       Next Steps: Follow up in 2 week(s)    Instructions: STEMI f/u

## 2022-12-28 NOTE — DISCHARGE SUMMARY
[facility] - Cardiology Stepdown  Cardiology  Discharge Summary      Patient Name: Nai Boo  MRN: [id number]  Admission Date: 12/25/2022  Hospital Length of Stay: 3 days  Discharge Date and Time: No discharge date for patient encounter.  Attending Physician: Wili Best MD    Discharging Provider: Paola Mcgraw MD  Primary Care Physician: Jaycee Barriga MD    HPI:   Ms. Nai Boo is a 61 yo F with no known medical history presenting with crushing chest pain at 1030 this AM. She denies any exertional CP in the preceding months but does report an episode of chest pressure at rest yesterday that was similar to today's presentation but less severe. She was in normal state of health noting only calf claudication 2 months ago. She also reports 1-2 months of GAMINO after climbing stairs but considered this to be 2/2 deconditioning. She attends an exercise class once per week and denies any decrease in exercise tolerance in these classes. Today, symptoms were more severe and persistent. Symptoms started around 10-10:30am morning of admission. She was brought to ER by her . She also reports that she is a lifetime non-smoker, does not drink and denies illicit's. Family history pertinent for a brother with MI in 50s, Mother CVA in 90s, Father MI in 70s. She does report increased stress at work after her boss passed away 6 months ago with an increased workload leading to trouble sleeping and panic attacks.     In the ED, patient found to be in significant distress but hemodynamically stable. ECG showed inferior and posterior stemi findings. She was given morpine. Bedside echo showed infero septal hypokinesis with a quick picture. Code stemi called. SHe was loaded with ASA, brilinta, given 5k of heparin. Pt rolled to cath lab emergently after procedure discussed and consents obtained by patient and her .      Procedure(s) (LRB):  CATHETERIZATION, HEART, BOTH LEFT AND RIGHT (N/A)  Stent,  Drug Eluting, Single Vessel, Coronary  INSERTION, INTRA-AORTIC BALLOON PUMP     Indwelling Lines/Drains at time of discharge:  Lines/Drains/Airways     None                 Hospital Course:  Admitted to CCU after C where 2x MARQUES were placed to RCA for acute STEMI. IABP left in place to aid with coronary perfusion not ionotropic support. Had been loaded with heparin drip and DAPT with brillinta, tirofiban was also used while on IABP. IABP removed 12/26. With some ongoing chest achiness but this is different in quality from her initial ischemic chest pain. EF 35-40% now after NSTEMI. Brillinta changed to prasugrel due to insurance issues. Stepped down from ICU on 12/27. Feeling well and ready for discharge.     Physical Exam  Vitals and nursing note reviewed.   Constitutional:       General: She is not in acute distress.     Appearance: She is not toxic-appearing or diaphoretic.   Cardiovascular:      Rate and Rhythm: Normal rate and regular rhythm.      Heart sounds: No murmur heard.  Pulmonary:      Effort: Pulmonary effort is normal. No respiratory distress.      Breath sounds: No wheezing.   Abdominal:      Palpations: Abdomen is soft.      Tenderness: There is no guarding.   Musculoskeletal:      Right lower leg: No edema.      Left lower leg: No edema.   Skin:     General: Skin is warm and dry.   Neurological:      Mental Status: She is alert. Mental status is at baseline.      Cranial Nerves: No dysarthria.   Psychiatric:         Mood and Affect: Mood normal.         Behavior: Behavior normal.     Goals of Care Treatment Preferences:  Code Status: Full Code      Consults:     Significant Diagnostic Studies: Labs:   Troponin   Recent Labs   Lab 12/25/22 2033 12/26/22  0010 12/26/22  0831   TROPONINI >50.000* >50.000* 41.462*       Pending Diagnostic Studies:     None          Final Active Diagnoses:    Diagnosis Date Noted POA    PRINCIPAL PROBLEM:  STEMI (ST elevation myocardial infarction) [I21.3]  12/25/2022 Yes    HFrEF (heart failure with reduced ejection fraction) [I50.20] 12/27/2022 Yes    Thrombocytopenia [D69.6] 12/27/2022 Yes    Essential hypertension [I10] 12/26/2022 Yes      Problems Resolved During this Admission:    Diagnosis Date Noted Date Resolved POA    On intra-aortic balloon pump assist [Z98.890] 12/25/2022 12/27/2022 Not Applicable     No new Assessment & Plan notes have been filed under this hospital service since the last note was generated.  Service: Cardiology      Discharged Condition: fair    Disposition: Home or Self Care    Follow Up:   Follow-up Information     [facility] - Cardiology - 3rd Fl Follow up in 2 week(s).    Specialty: Cardiology  Why: STEMI f/u  Contact information:  [address]  [facility] [address] [phone]  Additional information:  Cardiology Services Clinics - 3rd floor                     Patient Instructions:      Ambulatory referral/consult to Cardiac Rehab   Standing Status: Future   Referral Priority: Routine Referral Type: Consultation   Referral Reason: Specialty Services Required   Requested Specialty: Cardiac Rehabilitation   Number of Visits Requested: 1     Ambulatory referral/consult to Cardiology   Standing Status: Future   Referral Priority: Routine Referral Type: Consultation   Referral Reason: Specialty Services Required   Requested Specialty: Cardiology   Number of Visits Requested: 1     Cardiac rehab phase ii   Standing Status: Future Standing Exp. Date: 12/28/23     Order Specific Question Answer Comments   Department [facility] CARDIAC REHAB    Select qualifying diagnosis: I21.3 - ST elevation (STEMI) myocardial infarction of unspecified site      Medications:  Reconciled Home Medications:      Medication List      START taking these medications    aspirin 81 MG EC tablet  Commonly known as: ECOTRIN  Take 1 tablet (81 mg total) by mouth once daily.     atorvastatin 80 MG tablet  Commonly known as: LIPITOR  Take 1 tablet (80  mg total) by mouth once daily.     metoprolol succinate 50 MG 24 hr tablet  Commonly known as: TOPROL-XL  Take 1 tablet (50 mg total) by mouth once daily.     prasugreL 5 mg Tab  Commonly known as: EFFIENT  Take 1 tablet (5 mg total) by mouth once daily.     spironolactone 25 MG tablet  Commonly known as: ALDACTONE  Take 1 tablet (25 mg total) by mouth once daily.     valsartan 40 MG tablet  Commonly known as: DIOVAN  Take 1 tablet (40 mg total) by mouth once daily.        CONTINUE taking these medications    multivitamin capsule  Take 1 capsule by mouth once daily.     THERATEARS OPHT  Apply to eye.        STOP taking these medications    naproxen sodium 220 MG tablet  Commonly known as: ALEVE            Time spent on the discharge of patient: 30 minutes    Paola Mcgraw MD  Cardiology  [facility] - Cardiology Stepdown

## 2022-12-28 NOTE — NURSING
Patient is ready for discharge. Patient stable alert and oriented. Ivs and TELE removed. No complaints of pain. Discussed discharge plan. Reviewed medications and side effects, appointments, and answered questions with patient and family. RX given to patient @ bedside. Prasugrel was sent to pt's pharmacy. Pt left via wheelchair with transport.

## 2022-12-28 NOTE — LETTER
December 28, 2022    Nai Boo  [address] [address]             [facility]  [address]  [place] [address]  Phone: [phone]                                  [facility]   [address]   [place] [address]  [phone]         [facility]   [address] [place] [address] [phone]         [facility]    [address] [facility] [address] [place] [address] [phone]   Re: Nai Boo  Clinic number: [id number]    Dear Ms. Boo:    You were recently admitted to an [facility] facility for cardiac (heart) problem.  Your physician has referred you to [facility] Re[facility].  Cardiac Rehab Phase 2 is an educational and exercise program, conducted in a outpatient setting, proven to help reduce your risk for recurrent heart events.    Cardiac rehab has two major parts:    1. Exercise training to help you achieve cardiovascular fitness while learning how to exercise safely and improve muscle strength and endurance.  Your exercise prescription will be based on the results of the cardiopulmonary stress test (CPX) which will be done before entering the program and at completion.  2. Education, counseling and training to help you understand your heart condition and find ways to reduce your risk of future heart problems.  The cardiac rehab team will help you learn how to cope with the stress of adjusting to a new lifestyle and to deal with your fears about the future.    Phase 2 is a 36-session program, meeting 3 times a week for 12 weeks.  Each session consists of an hour of exercise and half-hour dedicated to the educational topic of the day.  Class days vary per location.  Please contact your nearest facility for details.    Through cardiac rehab you will learn:  About your heart condition, medical therapies, and medication  Risk factors in y our lifestyle  contributing to heart disease  New strategies to modify your risk factors  About a healthy diet that can lower your blood cholesterol, control weight, help prevent or control high blood pressure, and diabetes  How to stop smoking  How to manage stress    If you are interested in getting started, call the [facility] of your choosing.     Sincerely,     [facility]

## 2022-12-28 NOTE — TELEPHONE ENCOUNTER
Thank you for the referral for Nia Boo  to [facility]. However her order is placed incorrectly as an ambulatory referral. Please place correct order in [facility] for Cardiac Rehab Phase II (CRR4) so that we can submit to insurance for authorization. We will call patient after it is approved. Please contact me for further information or questions. Thank you again.  Lenard Lozada RN  Cardiac Rehab Nurse  [phone]

## 2022-12-28 NOTE — TELEPHONE ENCOUNTER
"Information packet sent to patient, which includes "Your guide to living with heart disease". Letter was also sent to patient.      Lenard Lozada RN  Cardiac Rehab Nurse  "

## 2022-12-28 NOTE — PLAN OF CARE
APPOINTMENT:    Patient has been scheduled for a Cardiology Hospital Follow Up with Agustin Helms MD on Monday Jan 9, 2023 at 9:00 AM.    Please arrive approximately 15 minutes before your scheduled appointment time and ensure that you have a valid government issued ID and your insurance card.     Cardiology Services Clinics - 3rd floor  [facility] - [facility]iology - [facility]  [address]   [facility] [address]  [phone]          Tala Gates  Community Health Worker(CHW)  Case Management  Ext. [phone]

## 2022-12-28 NOTE — SUBJECTIVE & OBJECTIVE
Interval History: NAEON. Tolerated removal of balloon pump well. EF returned moderately decreased at 35-40%. Stepped down from ICU.     Review of Systems   Constitutional: Negative for chills and diaphoresis.   Cardiovascular:  Positive for chest pain (achiness not same as initial ischemic pain). Negative for palpitations.   Respiratory:  Negative for cough and shortness of breath.    Gastrointestinal:  Negative for nausea and vomiting.   Neurological:  Negative for dizziness.   Objective:     Vital Signs (Most Recent):  Temp: 99.1 °F (37.3 °C) (12/28/22 1201)  Pulse: 78 (12/28/22 1201)  Resp: 18 (12/28/22 1201)  BP: (!) 108/59 (12/28/22 1201)  SpO2: 96 % (12/28/22 1201)   Vital Signs (24h Range):  Temp:  [98.3 °F (36.8 °C)-99.3 °F (37.4 °C)] 99.1 °F (37.3 °C)  Pulse:  [69-96] 78  Resp:  [18-19] 18  SpO2:  [93 %-97 %] 96 %  BP: (103-120)/(54-78) 108/59     Weight: 50.4 kg (111 lb 1.8 oz)  Body mass index is 18.49 kg/m².     SpO2: 96 %         Intake/Output Summary (Last 24 hours) at 12/28/2022 1319  Last data filed at 12/28/2022 0910  Gross per 24 hour   Intake 680 ml   Output 240 ml   Net 440 ml         Lines/Drains/Airways       None                   Physical Exam  Vitals and nursing note reviewed.   Constitutional:       General: She is not in acute distress.     Appearance: She is not toxic-appearing or diaphoretic.   Cardiovascular:      Rate and Rhythm: Normal rate and regular rhythm.      Heart sounds: No murmur heard.  Pulmonary:      Effort: Pulmonary effort is normal. No respiratory distress.      Breath sounds: No wheezing.   Abdominal:      Palpations: Abdomen is soft.      Tenderness: There is no guarding.   Musculoskeletal:      Right lower leg: No edema.      Left lower leg: No edema.   Skin:     General: Skin is warm and dry.   Neurological:      Mental Status: She is alert. Mental status is at baseline.      Cranial Nerves: No dysarthria.   Psychiatric:         Mood and Affect: Mood normal.          Behavior: Behavior normal.       Significant Labs: All pertinent lab results from the last 24 hours have been reviewed.    Significant Imaging:  na

## 2022-12-29 LAB — POCT GLUCOSE: 111 MG/DL (ref 70–110)

## 2022-12-31 ENCOUNTER — NURSE TRIAGE (OUTPATIENT)
Dept: ADMINISTRATIVE | Facility: CLINIC | Age: 62
End: 2022-12-31
Payer: COMMERCIAL

## 2022-12-31 NOTE — TELEPHONE ENCOUNTER
"Pt d/c from hospital on 12/18. She states, "The doctor was saying that I am cleared to go back to work on Tuesday, but they won't let me come back to work without a return to work clearance letter."     Pt is advised that a high priority message will be routed to her cardiologist requesting direct f/u with pt. Pt instructed to call back with any additional questions or concerns; she verbalizes understanding.   Reason for Disposition   [1] Caller requesting NON-URGENT health information AND [2] PCP's office is the best resource    Protocols used: Information Only Call - No Triage-A-    "

## 2023-01-03 NOTE — PHYSICIAN QUERY
PT Name: Nai Boo  MR #: [id number]     DOCUMENTATION CLARIFICATION     CDS/: DIALLO Casillas, RN, CCDS               Contact information: [email or web address]  This form is a permanent document in the medical record.     Query Date: January 3, 2023    By submitting this query, we are merely seeking further clarification of documentation.  Please utilize your independent clinical judgment when addressing the question(s) below.    The Medical Record contains the following   Indicators Supporting Clinical Findings Location in Medical Record   X Heart Failure documented HFrEF  EF 35-40% now after STEMI    HFrEF (heart failure with reduced ejection fraction)   Cards PN: Dr. Mcgraw 12/27      DCS: Dr. Mcgraw 12/28      BNP     X EF/Echo moderately decreased systolic function.  There are segmental left ventricular wall motion abnormalities.  The estimated ejection fraction is 35-40%.  Grade I left ventricular diastolic dysfunction.    Echo: 12/27   x Radiology findings No consolidation, pleural effusion, or pneumothorax.  Cardiac silhouette is enlarged, stable   CXR: 12/26   X Subjective/Objective Respiratory Conditions  reports 1-2 months of GAMINO after climbing stairs but considered this to be 2/2 deconditioning Cards H & P: Dr. Helms 12/25   X Recent/Current MI STEMI (ST elevation myocardial infarction)  61 yo F without significant medical history presenting with severe angina  Inferoposterior STEMI on ecg       Heart Transplant, LVAD     X Edema, JVD Negative for leg swelling, near-syncope, orthopnea   Cards H & P: Dr. Helms 12/25    Ascites     X Diuretics/Meds spironolactone tablet 25 mg  Dose: 25 mg  Freq: Daily Route: Oral  Start: 12/28/22 0900 End: 12/28/22 1544 MAR    Other Treatment      Other       Heart failure is a clinical diagnosis which includes symptomatic fluid retention, elevated intracardiac pressures, and/or the inability of the heart to deliver adequate blood flow.    Heart  Failure with reduced Ejection Fraction (HFrEF) or Systolic Heart Failure (loses ability to contract normally, EF is <40%)    Heart Failure with preserved Ejection Fraction (HFpEF) or Diastolic Heart Failure (stiff ventricles, does not relax properly, EF is >50%)     Heart Failure with Combined Systolic and Diastolic Failure (stiff ventricles, does not relax properly and EF is <50%)    Mid-range or mildly reduced ejection fraction (HFmrEF) is classified as systolic heart failure.  Congestive heart failure with a recovered EF is classified as Diastolic Heart Failure.  Common clues to acute exacerbation:  Rapidly progressive symptoms (w/in 2 weeks of presentation), using IV diuretics, using supplemental O2, pulmonary edema on Xray, new or worsening pleural effusion, +JVD or other signs of volume overload, MI w/in 4 weeks, and/or BNP >500  The clinical guidelines noted are only system guidelines, and do not replace the providers clinical judgment.    Provider, please specify the acuity of systolic CHF associated with the above clinical findings.    [ X  ]  Acute Systolic Heart Failure (HFrEF or HFmrEF) - new diagnosis   [   ]  Acute on Chronic Systolic Heart Failure (HFrEF or HFmrEF) - worsening of CHF signs/symptoms in preexisting CHF   [   ]  Chronic Systolic Heart Failure (HFrEF or HFmrEF) - preexisting and stable   [   ]  Other (please specify): ___________________________________   [  ]  Clinically Undetermined     Please document in your progress notes daily for the duration of treatment until resolved and include in your discharge summary.    References:  American Heart Association editorial staff. (2017, May). Ejection Fraction Heart Failure Measurement. American Heart Association. https://www.heart.org/en/health-topics/heart-failure/diagnosing-heart-failure/ejection-fraction-heart-failure-measurement#:~:text=Ejection%20fraction%20(EF)%20is%20a,pushed%20out%20with%20each%20heartbeat  BUDDY Piper (2020,  December 15). Heart failure with preserved ejection fraction: Clinical manifestations and diagnosis. CIQUALToDate. https://www.[employer].com/contents/heart-failure-with-preserved-ejection-fraction-clinical-manifestations-and-diagnosis.  ICD-10-CM/PCS Coding Clinic Third Quarter ICD-10, Effective with discharges: September 8, 2020 [facility] [place] Heart failure with mid-range or mildly reduced ejection fraction (2020).  ICD-10-CM/PCS Coding Clinic Third Quarter ICD-10, Effective with discharges: September 8, 2020 [facility] § Heart failure with recovered ejection fraction (2020).  Form No. [id number]

## 2023-01-04 ENCOUNTER — TELEPHONE (OUTPATIENT)
Dept: CARDIAC REHAB | Facility: CLINIC | Age: 63
End: 2023-01-04
Payer: COMMERCIAL

## 2023-01-04 NOTE — TELEPHONE ENCOUNTER
----- Message from Darren Hook sent at 1/3/2023  1:05 PM CST -----  Regarding: Appointment  PT called to schedule an appt.  Pt can be reached @ [phone]      Thanks

## 2023-01-08 PROBLEM — I25.5 ISCHEMIC CARDIOMYOPATHY: Status: ACTIVE | Noted: 2022-12-27

## 2023-01-08 PROBLEM — I25.2 HISTORY OF ST ELEVATION MYOCARDIAL INFARCTION (STEMI): Status: ACTIVE | Noted: 2022-12-25

## 2023-01-09 ENCOUNTER — OFFICE VISIT (OUTPATIENT)
Dept: CARDIOLOGY | Facility: CLINIC | Age: 63
End: 2023-01-09
Payer: COMMERCIAL

## 2023-01-09 VITALS
WEIGHT: 112.19 LBS | HEIGHT: 65 IN | DIASTOLIC BLOOD PRESSURE: 56 MMHG | HEART RATE: 69 BPM | SYSTOLIC BLOOD PRESSURE: 116 MMHG | BODY MASS INDEX: 18.69 KG/M2

## 2023-01-09 DIAGNOSIS — I10 ESSENTIAL HYPERTENSION: ICD-10-CM

## 2023-01-09 DIAGNOSIS — I25.5 ISCHEMIC CARDIOMYOPATHY: ICD-10-CM

## 2023-01-09 DIAGNOSIS — I21.3 ST ELEVATION MYOCARDIAL INFARCTION (STEMI), UNSPECIFIED ARTERY: ICD-10-CM

## 2023-01-09 DIAGNOSIS — I25.2 HISTORY OF ST ELEVATION MYOCARDIAL INFARCTION (STEMI): Primary | ICD-10-CM

## 2023-01-09 PROCEDURE — 93000 EKG 12-LEAD: ICD-10-PCS | Mod: S$GLB,,, | Performed by: INTERNAL MEDICINE

## 2023-01-09 PROCEDURE — 99999 PR PBB SHADOW E&M-EST. PATIENT-LVL IV: ICD-10-PCS | Mod: PBBFAC,,, | Performed by: INTERNAL MEDICINE

## 2023-01-09 PROCEDURE — 1160F RVW MEDS BY RX/DR IN RCRD: CPT | Mod: CPTII,S$GLB,, | Performed by: INTERNAL MEDICINE

## 2023-01-09 PROCEDURE — 3074F PR MOST RECENT SYSTOLIC BLOOD PRESSURE < 130 MM HG: ICD-10-PCS | Mod: CPTII,S$GLB,, | Performed by: INTERNAL MEDICINE

## 2023-01-09 PROCEDURE — 99999 PR PBB SHADOW E&M-EST. PATIENT-LVL IV: CPT | Mod: PBBFAC,,, | Performed by: INTERNAL MEDICINE

## 2023-01-09 PROCEDURE — 3074F SYST BP LT 130 MM HG: CPT | Mod: CPTII,S$GLB,, | Performed by: INTERNAL MEDICINE

## 2023-01-09 PROCEDURE — 1159F PR MEDICATION LIST DOCUMENTED IN MEDICAL RECORD: ICD-10-PCS | Mod: CPTII,S$GLB,, | Performed by: INTERNAL MEDICINE

## 2023-01-09 PROCEDURE — 3078F DIAST BP <80 MM HG: CPT | Mod: CPTII,S$GLB,, | Performed by: INTERNAL MEDICINE

## 2023-01-09 PROCEDURE — 1159F MED LIST DOCD IN RCRD: CPT | Mod: CPTII,S$GLB,, | Performed by: INTERNAL MEDICINE

## 2023-01-09 PROCEDURE — 1160F PR REVIEW ALL MEDS BY PRESCRIBER/CLIN PHARMACIST DOCUMENTED: ICD-10-PCS | Mod: CPTII,S$GLB,, | Performed by: INTERNAL MEDICINE

## 2023-01-09 PROCEDURE — 1111F DSCHRG MED/CURRENT MED MERGE: CPT | Mod: CPTII,S$GLB,, | Performed by: INTERNAL MEDICINE

## 2023-01-09 PROCEDURE — 93000 ELECTROCARDIOGRAM COMPLETE: CPT | Mod: S$GLB,,, | Performed by: INTERNAL MEDICINE

## 2023-01-09 PROCEDURE — 99214 OFFICE O/P EST MOD 30 MIN: CPT | Mod: S$GLB,,, | Performed by: INTERNAL MEDICINE

## 2023-01-09 PROCEDURE — 3078F PR MOST RECENT DIASTOLIC BLOOD PRESSURE < 80 MM HG: ICD-10-PCS | Mod: CPTII,S$GLB,, | Performed by: INTERNAL MEDICINE

## 2023-01-09 PROCEDURE — 99214 PR OFFICE/OUTPT VISIT, EST, LEVL IV, 30-39 MIN: ICD-10-PCS | Mod: S$GLB,,, | Performed by: INTERNAL MEDICINE

## 2023-01-09 PROCEDURE — 1111F PR DISCHARGE MEDS RECONCILED W/ CURRENT OUTPATIENT MED LIST: ICD-10-PCS | Mod: CPTII,S$GLB,, | Performed by: INTERNAL MEDICINE

## 2023-01-09 PROCEDURE — 3008F BODY MASS INDEX DOCD: CPT | Mod: CPTII,S$GLB,, | Performed by: INTERNAL MEDICINE

## 2023-01-09 PROCEDURE — 3008F PR BODY MASS INDEX (BMI) DOCUMENTED: ICD-10-PCS | Mod: CPTII,S$GLB,, | Performed by: INTERNAL MEDICINE

## 2023-01-09 NOTE — PROGRESS NOTES
Chart has been dictated using voice recognition software.  It is not been reviewed carefully for any transcriptional errors due to this technology.   Subjective:   Patient ID:  Nai Boo is a 62 y.o. female who presents for evaluation of No chief complaint on file.      HPI: Patient with hypertension admitted 25-28-Dec-2023 with RCA STEMI.  Patient had emmergency PCI of RCA which was a large dominant vessel extending to the lateral wall.  Patient needed an IABP because of poor flow following the stent placement.  Patient successfully weaned the following day and discharged 3 days later.  EF following PCI showed an EF of 35-40%.    Patient having back tightness with arm movement.  Has been very fatigued and has not been very active.  Had upper leg tightness when she went walking without back pain.   Patient denies any dyspnea at rest or on exertion, orthopnea, PND, or edema.  Patient denies any palpitations, lightheadedness, or syncope. Had claudication symptoms after several blocks and after 3 flights of stairs but denies lower extremity claudication since MI.    Home blood pressure readings:      Cardiac risk factors: positive family history    Past Medical History:   Diagnosis Date    Hypertension     Trigger finger        Past Surgical History:   Procedure Laterality Date    AMPUTATION Left     finger amputation x2    BREAST BIOPSY Left 2016    benign    CATHETERIZATION OF BOTH LEFT AND RIGHT HEART N/A 2022    Procedure: CATHETERIZATION, HEART, BOTH LEFT AND RIGHT;  Surgeon: Gilmar Ash MD;  Location: [facility] CATH LAB;  Service: Cardiology;  Laterality: N/A;     SECTION      EYE SURGERY      cataracts    INSERTION OF INTRA-AORTIC BALLOON ASSIST DEVICE  2022    Procedure: INSERTION, INTRA-AORTIC BALLOON PUMP;  Surgeon: Gilmar Ash MD;  Location: [facility] CATH LAB;  Service: Cardiology;;    SINUS SURGERY      STENT, DRUG ELUTING, SINGLE VESSEL, CORONARY  2022     Procedure: Stent, Drug Eluting, Single Vessel, Coronary;  Surgeon: Gilmar Ash MD;  Location: [facility] CATH LAB;  Service: Cardiology;;    TRIGGER FINGER RELEASE Right 3/15/2021    Procedure: RELEASE, TRIGGER THUMB;  Surgeon: Ailyn Renee MD;  Location: [facility] OR;  Service: Orthopedics;  Laterality: Right;  rt thumb       Social History     Tobacco Use    Smoking status: Never   Substance Use Topics    Alcohol use: Yes     Comment: occasinal        Outpatient Medications Prior to Visit   Medication Sig Dispense Refill    aspirin (ECOTRIN) 81 MG EC tablet Take 1 tablet (81 mg total) by mouth once daily. 30 tablet 11    atorvastatin (LIPITOR) 80 MG tablet Take 1 tablet (80 mg total) by mouth once daily. 90 tablet 3    carboxymethylcellulose sodium (THERATEARS OPHT) Apply to eye.      metoprolol succinate (TOPROL-XL) 50 MG 24 hr tablet Take 1 tablet (50 mg total) by mouth once daily. 30 tablet 11    multivitamin capsule Take 1 capsule by mouth once daily.      prasugreL (EFFIENT) 5 mg Tab Take 1 tablet (5 mg total) by mouth once daily. 30 tablet 11    spironolactone (ALDACTONE) 25 MG tablet Take 1 tablet (25 mg total) by mouth once daily. 30 tablet 11    valsartan (DIOVAN) 40 MG tablet Take 1 tablet (40 mg total) by mouth once daily. 90 tablet 3     No facility-administered medications prior to visit.       Review of patient's allergies indicates:   Allergen Reactions    Sulfa (sulfonamide antibiotics) Swelling       Review of Systems   Constitutional: Negative for weight gain and weight loss.   Respiratory:  Negative for hemoptysis.    Endocrine:        Patient has been feeling cold.   Hematologic/Lymphatic: Negative for bleeding problem. Bruises/bleeds easily.   Gastrointestinal:  Negative for hematemesis and hematochezia.   Neurological:  Negative for focal weakness, numbness and weakness.    Objective:   Physical Exam  Constitutional:       Appearance: She is well-developed.      Comments: BP (!) 116/56 (BP  "Location: Left arm, Patient Position: Sitting)   Pulse 69   Ht 5' 5" (1.651 m)   Wt 50.9 kg (112 lb 3.4 oz)   LMP 03/11/2021   BMI 18.67 kg/m²      Neck:      Vascular: No carotid bruit or JVD.   Cardiovascular:      Rate and Rhythm: Normal rate and regular rhythm.      Pulses: Intact distal pulses.      Heart sounds: Normal heart sounds. No murmur heard.    No friction rub. No gallop.      Comments: Split S1  Pulmonary:      Effort: Pulmonary effort is normal.      Breath sounds: Normal breath sounds. No wheezing or rales.   Abdominal:      General: Bowel sounds are normal. There is no abdominal bruit.      Palpations: Abdomen is soft. There is no hepatomegaly.      Tenderness: There is no abdominal tenderness.   Musculoskeletal:      Cervical back: Neck supple.   Skin:     Nails: There is no clubbing.          Neurological:      Mental Status: She is alert and oriented to person, place, and time.       Lab Results   Component Value Date    WBC 8.10 12/28/2022    HGB 13.8 12/28/2022    HCT 40.7 12/28/2022    MCV 96 12/28/2022     (L) 12/28/2022       Lab Results   Component Value Date     12/28/2022    K 4.1 12/28/2022    BUN 14 12/28/2022    CREATININE 0.8 12/28/2022    GLU 94 12/28/2022    HGBA1C 5.2 12/26/2022    CHOL 149 12/26/2022    HDL 34 (L) 12/26/2022    LDLCALC 93.0 12/26/2022    TRIG 110 12/26/2022    CHOLHDL 22.8 12/26/2022    HGB 13.8 12/28/2022    HCT 40.7 12/28/2022     (L) 12/28/2022     ECG (today) shows normal sinus rhythm with normal intervals and a mild left axis deviation.  There was low limb lead voltage and inverted T-waves in the inferolateral leads.  Since her last ECG in the hospital, the axis is shifted slightly to the left.  Assessment:     1. History of ST elevation myocardial infarction (STEMI)    2. Essential hypertension    3. Ischemic cardiomyopathy      The patient is doing well following her myocardial infarction although is not unclear whether her back " pain represents recurrent ischemia.  At this time, I do not think further evaluation is needed.  The patient is supposed to enter cardiac rehabilitation at which time she will have an exercise test performed which will help diagnose whether this pain is due to her heart.  The patient has had no further chest discomfort, shortness of breath, palpitations, lightheadedness, or syncope.  She does note significant fatigue which may be in part due to her beta-blockade.  However, her TSH is elevated and her free T4 was at the very low end of normal so that it may be related to hypothyroidism.  This would also account for her feeling cold and having borderline low blood pressure.    No change will be made in the patient's medications.  She was given a copy the Mediterranean diet to follow.  She is been encouraged to walk and I will follow-up on cardiac rehabilitation.  The patient needs an echocardiogram in 2 months. Unless there are intervening problems, patient should return for re-evaluation in 3 months.     Plan:     Diagnoses and all orders for this visit:    History of ST elevation myocardial infarction (STEMI)    Essential hypertension    Ischemic cardiomyopathy          Agustin Helms MD  Consultative Cardiology

## 2023-01-09 NOTE — Clinical Note
Your patient, Nai Boo , was referred by the coronary care unit team for evaluation of coronary artery disease status post myocardial infarction. Please see my note for details of this encounter. If you have any questions, please contact me.  Thank you for allowing me to participate in care of this patient.

## 2023-01-11 ENCOUNTER — TELEPHONE (OUTPATIENT)
Dept: CARDIAC REHAB | Facility: CLINIC | Age: 63
End: 2023-01-11
Payer: COMMERCIAL

## 2023-01-11 NOTE — TELEPHONE ENCOUNTER
----- Message from Erika Dutta sent at 1/10/2023  3:36 PM CST -----  Regarding: please call  The pt is waiting on a call for rehab. Please call her back @ [phone]. Thanks, Erika

## 2023-01-17 DIAGNOSIS — I25.10 ATHEROSCLEROSIS OF NATIVE CORONARY ARTERY OF NATIVE HEART WITHOUT ANGINA PECTORIS: ICD-10-CM

## 2023-01-17 DIAGNOSIS — Z98.61 POSTSURGICAL PERCUTANEOUS TRANSLUMINAL CORONARY ANGIOPLASTY STATUS: Primary | ICD-10-CM

## 2023-01-22 ENCOUNTER — HOSPITAL ENCOUNTER (EMERGENCY)
Facility: HOSPITAL | Age: 63
Discharge: HOME | End: 2023-01-22
Attending: EMERGENCY MEDICINE
Payer: COMMERCIAL

## 2023-01-22 VITALS
OXYGEN SATURATION: 99 % | HEART RATE: 54 BPM | BODY MASS INDEX: 18.16 KG/M2 | RESPIRATION RATE: 16 BRPM | TEMPERATURE: 97 F | HEIGHT: 65 IN | DIASTOLIC BLOOD PRESSURE: 56 MMHG | SYSTOLIC BLOOD PRESSURE: 110 MMHG | WEIGHT: 109 LBS

## 2023-01-22 DIAGNOSIS — R07.89 CHEST DISCOMFORT: Primary | ICD-10-CM

## 2023-01-22 DIAGNOSIS — R07.9 CHEST PAIN: ICD-10-CM

## 2023-01-22 DIAGNOSIS — R74.01 TRANSAMINITIS: ICD-10-CM

## 2023-01-22 LAB
ALBUMIN SERPL BCP-MCNC: 3.9 G/DL (ref 3.5–5.2)
ALP SERPL-CCNC: 377 U/L (ref 55–135)
ALT SERPL W/O P-5'-P-CCNC: 152 U/L (ref 10–44)
ANION GAP SERPL CALC-SCNC: 11 MMOL/L (ref 8–16)
AST SERPL-CCNC: 104 U/L (ref 10–40)
BASOPHILS # BLD AUTO: 0.04 K/UL (ref 0–0.2)
BASOPHILS NFR BLD: 0.9 % (ref 0–1.9)
BILIRUB SERPL-MCNC: 1.2 MG/DL (ref 0.1–1)
BNP SERPL-MCNC: 198 PG/ML (ref 0–99)
BUN SERPL-MCNC: 16 MG/DL (ref 8–23)
CALCIUM SERPL-MCNC: 10.3 MG/DL (ref 8.7–10.5)
CHLORIDE SERPL-SCNC: 104 MMOL/L (ref 95–110)
CO2 SERPL-SCNC: 25 MMOL/L (ref 23–29)
CREAT SERPL-MCNC: 0.9 MG/DL (ref 0.5–1.4)
DIFFERENTIAL METHOD: ABNORMAL
EOSINOPHIL # BLD AUTO: 0.5 K/UL (ref 0–0.5)
EOSINOPHIL NFR BLD: 9.7 % (ref 0–8)
ERYTHROCYTE [DISTWIDTH] IN BLOOD BY AUTOMATED COUNT: 12.1 % (ref 11.5–14.5)
EST. GFR  (NO RACE VARIABLE): >60 ML/MIN/1.73 M^2
GLUCOSE SERPL-MCNC: 102 MG/DL (ref 70–110)
HCT VFR BLD AUTO: 44.5 % (ref 37–48.5)
HCV AB SERPL QL IA: NORMAL
HGB BLD-MCNC: 14.7 G/DL (ref 12–16)
HIV 1+2 AB+HIV1 P24 AG SERPL QL IA: NORMAL
IMM GRANULOCYTES # BLD AUTO: 0.01 K/UL (ref 0–0.04)
IMM GRANULOCYTES NFR BLD AUTO: 0.2 % (ref 0–0.5)
LYMPHOCYTES # BLD AUTO: 0.8 K/UL (ref 1–4.8)
LYMPHOCYTES NFR BLD: 16.3 % (ref 18–48)
MCH RBC QN AUTO: 31.6 PG (ref 27–31)
MCHC RBC AUTO-ENTMCNC: 33 G/DL (ref 32–36)
MCV RBC AUTO: 96 FL (ref 82–98)
MONOCYTES # BLD AUTO: 0.3 K/UL (ref 0.3–1)
MONOCYTES NFR BLD: 7.3 % (ref 4–15)
NEUTROPHILS # BLD AUTO: 3.1 K/UL (ref 1.8–7.7)
NEUTROPHILS NFR BLD: 65.6 % (ref 38–73)
NRBC BLD-RTO: 0 /100 WBC
PLATELET # BLD AUTO: 119 K/UL (ref 150–450)
PMV BLD AUTO: 10.7 FL (ref 9.2–12.9)
POC CARDIAC TROPONIN I: 0.01 NG/ML (ref 0–0.08)
POTASSIUM SERPL-SCNC: 4.5 MMOL/L (ref 3.5–5.1)
PROT SERPL-MCNC: 7.3 G/DL (ref 6–8.4)
RBC # BLD AUTO: 4.65 M/UL (ref 4–5.4)
SAMPLE: NORMAL
SODIUM SERPL-SCNC: 140 MMOL/L (ref 136–145)
TROPONIN I SERPL DL<=0.01 NG/ML-MCNC: 0.02 NG/ML (ref 0–0.03)
TROPONIN I SERPL DL<=0.01 NG/ML-MCNC: 0.02 NG/ML (ref 0–0.03)
WBC # BLD AUTO: 4.65 K/UL (ref 3.9–12.7)

## 2023-01-22 PROCEDURE — 99285 PR EMERGENCY DEPT VISIT,LEVEL V: ICD-10-PCS | Mod: ,,, | Performed by: EMERGENCY MEDICINE

## 2023-01-22 PROCEDURE — 93010 EKG 12-LEAD: ICD-10-PCS | Mod: ,,, | Performed by: INTERNAL MEDICINE

## 2023-01-22 PROCEDURE — 99285 EMERGENCY DEPT VISIT HI MDM: CPT | Mod: 25

## 2023-01-22 PROCEDURE — 93010 ELECTROCARDIOGRAM REPORT: CPT | Mod: ,,, | Performed by: INTERNAL MEDICINE

## 2023-01-22 PROCEDURE — 25000003 PHARM REV CODE 250

## 2023-01-22 PROCEDURE — 80053 COMPREHEN METABOLIC PANEL: CPT

## 2023-01-22 PROCEDURE — 87389 HIV-1 AG W/HIV-1&-2 AB AG IA: CPT | Performed by: PHYSICIAN ASSISTANT

## 2023-01-22 PROCEDURE — 94761 N-INVAS EAR/PLS OXIMETRY MLT: CPT

## 2023-01-22 PROCEDURE — 84484 ASSAY OF TROPONIN QUANT: CPT

## 2023-01-22 PROCEDURE — 85025 COMPLETE CBC W/AUTO DIFF WBC: CPT

## 2023-01-22 PROCEDURE — 83880 ASSAY OF NATRIURETIC PEPTIDE: CPT

## 2023-01-22 PROCEDURE — 99285 EMERGENCY DEPT VISIT HI MDM: CPT | Mod: ,,, | Performed by: EMERGENCY MEDICINE

## 2023-01-22 PROCEDURE — 86803 HEPATITIS C AB TEST: CPT | Performed by: PHYSICIAN ASSISTANT

## 2023-01-22 PROCEDURE — 93005 ELECTROCARDIOGRAM TRACING: CPT

## 2023-01-22 RX ORDER — ASPIRIN 325 MG
325 TABLET ORAL
Status: COMPLETED | OUTPATIENT
Start: 2023-01-22 | End: 2023-01-22

## 2023-01-22 RX ADMIN — ASPIRIN 325 MG: 325 TABLET ORAL at 01:01

## 2023-01-22 NOTE — ED PROVIDER NOTES
"Encounter Date: 1/22/2023       History     Chief Complaint   Patient presents with    Chest Pain     Stents placed jeffrey, upper back pain     62-year-old female with history of STEMI 1 month prior, 2 stents placed, otherwise healthy female is presenting to the emergency department complaining of intermittent chest pressure and back pain.  She describes the back pain as a soreness.  She is tried Tylenol at home for symptoms without significant relief.  States that the chest pain is different from when she had a heart attack.  The pain is not as severe, more of a dull pressure.  She also reports "upset stomach".  She is not had any vomiting, abdominal pain, diarrhea, shortness of breath, cough, fever, calf pain, lower extremity swelling.  Patient denies any aggravating or alleviating factors for the back pain.  She describes the back pain as across upper back area.  At the time of my evaluation in the emergency department she states the back pain and chest pressure or actually not present.  She came for evaluation because these symptoms have been intermittent since the STEMI and she is unsure if this is part of normal recovery.  She followed up with a cardiologist normally as scheduled and her next follow-up is in the next couple of months.  She is been taking all her medications as directed, which are all new medications for her since the STEMI.  She did not ever have a history of hypertension or high cholesterol was pre-existing conditions prior to her heart attack.    The history is provided by the patient. No  was used.   Review of patient's allergies indicates:   Allergen Reactions    Sulfa (sulfonamide antibiotics) Swelling     Past Medical History:   Diagnosis Date    Hypertension     Trigger finger      Past Surgical History:   Procedure Laterality Date    AMPUTATION Left     finger amputation x2    BREAST BIOPSY Left 04/12/2016    benign    CATHETERIZATION OF BOTH LEFT AND RIGHT HEART " N/A 2022    Procedure: CATHETERIZATION, HEART, BOTH LEFT AND RIGHT;  Surgeon: Gilmar Ash MD;  Location: [facility] CATH LAB;  Service: Cardiology;  Laterality: N/A;     SECTION      EYE SURGERY      cataracts    INSERTION OF INTRA-AORTIC BALLOON ASSIST DEVICE  2022    Procedure: INSERTION, INTRA-AORTIC BALLOON PUMP;  Surgeon: Gilmar Ash MD;  Location: [facility] CATH LAB;  Service: Cardiology;;    SINUS SURGERY      STENT, DRUG ELUTING, SINGLE VESSEL, CORONARY  2022    Procedure: Stent, Drug Eluting, Single Vessel, Coronary;  Surgeon: Gilmar Ash MD;  Location: [facility] CATH LAB;  Service: Cardiology;;    TRIGGER FINGER RELEASE Right 3/15/2021    Procedure: RELEASE, TRIGGER THUMB;  Surgeon: Ailyn Renee MD;  Location: [facility] OR;  Service: Orthopedics;  Laterality: Right;  rt thumb     History reviewed. No pertinent family history.  Social History     Tobacco Use    Smoking status: Never   Substance Use Topics    Alcohol use: Yes     Comment: occasinal      Review of Systems   Constitutional:  Negative for chills and fever.   HENT:  Negative for rhinorrhea and sore throat.    Respiratory:  Negative for cough and shortness of breath.    Cardiovascular:  Positive for chest pain. Negative for leg swelling.   Gastrointestinal:  Positive for nausea. Negative for abdominal pain, diarrhea and vomiting.   Genitourinary:  Negative for dysuria and hematuria.   Musculoskeletal:  Positive for back pain. Negative for neck pain.   Skin:  Negative for rash and wound.   Neurological:  Negative for seizures and headaches.   Psychiatric/Behavioral:  Negative for agitation and behavioral problems.      Physical Exam     Initial Vitals [23 1233]   BP Pulse Resp Temp SpO2   116/60 78 16 97.4 °F (36.3 °C) 99 %      MAP       --         Physical Exam    Nursing note and vitals reviewed.  Constitutional: She appears well-developed and well-nourished.   HENT:   Head: Normocephalic and atraumatic.   Eyes:  Conjunctivae and EOM are normal.   Neck: Neck supple. No JVD present.   Normal range of motion.  Cardiovascular:  Normal rate, regular rhythm, normal heart sounds and intact distal pulses.           Pulmonary/Chest: No respiratory distress. She has no wheezes. She has no rhonchi. She has no rales.   Abdominal: Abdomen is soft. Bowel sounds are normal. She exhibits no distension. There is no abdominal tenderness. There is no rebound and no guarding.   Musculoskeletal:         General: No tenderness or edema. Normal range of motion.      Cervical back: Normal range of motion and neck supple.     Neurological: She is alert and oriented to person, place, and time. She has normal strength.   Skin: Skin is warm and dry.   Psychiatric: She has a normal mood and affect. Thought content normal.       ED Course   Procedures  Labs Reviewed   CBC W/ AUTO DIFFERENTIAL - Abnormal; Notable for the following components:       Result Value    MCH 31.6 (*)     Platelets 119 (*)     Lymph # 0.8 (*)     Lymph % 16.3 (*)     Eosinophil % 9.7 (*)     All other components within normal limits    Narrative:     Release to patient->Immediate   COMPREHENSIVE METABOLIC PANEL - Abnormal; Notable for the following components:    Total Bilirubin 1.2 (*)     Alkaline Phosphatase 377 (*)      (*)      (*)     All other components within normal limits    Narrative:     Release to patient->Immediate   HIV 1 / 2 ANTIBODY    Narrative:     Release to patient->Immediate   HEPATITIS C ANTIBODY    Narrative:     Release to patient->Immediate   TROPONIN I    Narrative:     Release to patient->Immediate   TROPONIN ISTAT   B-TYPE NATRIURETIC PEPTIDE   TROPONIN I   POCT TROPONIN     EKG Readings: (Independently Interpreted)   Initial Reading: No STEMI. Previous EKG: Compared with most recent EKG Rhythm: Normal Sinus Rhythm. Heart Rate: 67. ST Segments: Normal ST Segments. T Waves: Normal. T Waves Flipped: V3, V4, V5, V6, II, III and AVF.  "  Compared to recent EKG, ST elevations have improved.     Imaging Results              X-Ray Chest AP Portable (Final result)  Result time 01/22/23 13:25:59      Final result by Evans Patterson MD (01/22/23 13:25:59)                   Impression:      No acute cardiopulmonary finding or detrimental change when compared with 12/26/2022.      Electronically signed by: Evans Patterson MD  Date:    01/22/2023  Time:    13:25               Narrative:    EXAMINATION:  XR CHEST AP PORTABLE    CLINICAL HISTORY:  Provided history is "Chest Pain;  ".    TECHNIQUE:  One view of the chest.    COMPARISON:  12/26/2022.    FINDINGS:  Cardiac wires overlie the chest.  Cardiac silhouette is not enlarged.  No focal consolidation.  No sizable pleural effusion.  No pneumothorax.                                       Medications   aspirin tablet 325 mg (325 mg Oral Given 1/22/23 1310)     Medical Decision Making:   Initial Assessment:   62-year-old female with history of STEMI 1 month prior, 2 stents placed, otherwise healthy female is presenting to the emergency department complaining of intermittent chest pressure and back pain that has been ongoing since her STEMI.  He presents with normal vital signs.  Physical exam is unremarkable.  Cardiac workup initiated.  Her EKG is without ischemic changes, T-wave inversions are also seen on prior EKG.  Differential Diagnosis:   ACS, musculoskeletal pain, pericarditis, costochondritis, GERD, doubt aortic dissection, doubt PE given patient has no tachycardia or hypoxia  Clinical Tests:   Lab Tests: Ordered and Reviewed  Radiological Study: Ordered and Reviewed  Medical Tests: Ordered and Reviewed  ED Management:  Initial troponin within normal limits.  See ED course.  Patient did have recurrence of chest pressure while in the ED.  Will continue with repeat troponin to ensure no elevation.  This will be followed by oncoming team.  Patient is stable at the time of sign-out, anticipate " discharge home.           ED Course as of 01/22/23 1529   Sun Jan 22, 2023   1310 Pulse(!): 123  Heart rate of 123 recorded.  I was in the room at this time and patient did not have any tachycardia on the monitor.  On review of patient's cardiac monitor telemetry the patient does not have any tachycardia. [KL]   1350 Troponin I: 0.021  Within normal limits. [KL]   1350 BILIRUBIN TOTAL(!): 1.2  Bilirubin elevation has been stable. [KL]   1350 Alkaline Phosphatase(!): 377 [KL]   1350 AST(!): 104 [KL]   1350 ALT(!): 152  Acute elevation in transaminases. [KL]   1355 Patient does not have any right upper quadrant tenderness.  She denies significant alcohol use, especially not in the setting of recovering from her STEMI.  Patient was recently started on 7 medications after her STEMI, possibly medication side effect. [KL]   1359 Chest x-ray without any acute abnormality. [KL]   1402 HIV 1/2 Ag/Ab: Non-reactive [KL]   1402 Hepatitis C Ab: Non-reactive [KL]      ED Course User Index  [KL] Devorah Hathaway MD                 Clinical Impression:   Final diagnoses:  [R07.89] Chest discomfort (Primary)  [R07.9] Chest pain  [R74.01] Transaminitis        ED Disposition Condition    Discharge Stable          ED Prescriptions    None       Follow-up Information       Follow up With Specialties Details Why Contact Info    [facility] Emergency Dept Emergency Medicine Go to  As needed, If symptoms worsen [id number]  [facility] [id number] [phone]    Jaycee Barriga MD Obstetrics, Obstetrics and Gynecology Schedule an appointment as soon as possible for a visit in 2 days  [address]  [address]  [facility] [address]  [phone]               Devorah Hathaway MD  Resident  01/22/23 1527

## 2023-01-22 NOTE — DISCHARGE INSTRUCTIONS
As we discussed, your liver function labs were elevated today.  This may be due to a medication side effect.  Have these rechecked and monitored by your primary care doctor.  Call Monday to schedule an appointment for follow-up.  Return to the emergency department for worsening chest pain, shortness of breath, passing out or other concerns.     Tests today showed:   Labs Reviewed   CBC W/ AUTO DIFFERENTIAL - Abnormal; Notable for the following components:       Result Value    MCH 31.6 (*)     Platelets 119 (*)     Lymph # 0.8 (*)     Lymph % 16.3 (*)     Eosinophil % 9.7 (*)     All other components within normal limits    Narrative:     Release to patient->Immediate   COMPREHENSIVE METABOLIC PANEL - Abnormal; Notable for the following components:    Total Bilirubin 1.2 (*)     Alkaline Phosphatase 377 (*)      (*)      (*)     All other components within normal limits    Narrative:     Release to patient->Immediate   HIV 1 / 2 ANTIBODY    Narrative:     Release to patient->Immediate   HEPATITIS C ANTIBODY    Narrative:     Release to patient->Immediate   TROPONIN I    Narrative:     Release to patient->Immediate   TROPONIN ISTAT   TROPONIN I   B-TYPE NATRIURETIC PEPTIDE   POCT TROPONIN   POCT TROPONIN     X-Ray Chest AP Portable   Final Result      No acute cardiopulmonary finding or detrimental change when compared with 12/26/2022.         Electronically signed by: Evans Patterson MD   Date:    01/22/2023   Time:    13:25          Treatments you had today:   Medications   aspirin tablet 325 mg (325 mg Oral Given 1/22/23 1310)       Follow-Up Plan:  - Follow-up with primary care doctor within 3 - 5 days  - Additional testing and/or evaluation as directed by your primary doctor    Return to the Emergency Department for symptoms including but not limited to: worsening symptoms, shortness of breath or chest pain, vomiting with inability to hold down fluids, fevers greater than 100.4°F, passing  out/fainting/unconsciousness, or other concerning symptoms.

## 2023-01-22 NOTE — ED TRIAGE NOTES
Pt arrives to the ER with complaints of upper back pain and generalized weakness over the last couple of weeks. Hx of MI 12/25/22, pt had 2 stents placed.

## 2023-01-22 NOTE — PROVIDER PROGRESS NOTES - EMERGENCY DEPT.
Encounter Date: 1/22/2023    ED Physician Progress Notes        Physician Note:   Pt signed out to me at 3 PM    Briefly: pt is a 62 year old female with hx of STEMI(1 month ago) who presents for evaluation of left sided back soreness. Back pain worsens with positional changes. Pt signed out pending second troponin and reevaluation. Pt's 2nd troponin resulted and was negative. Shared decision making performed with pt who felt comfortable returning to home as opposed to being admitted for serial troponin and inpatient echo. Pt stable to discharge to home     Dispo: discharge

## 2023-01-24 ENCOUNTER — TELEPHONE (OUTPATIENT)
Dept: CARDIOLOGY | Facility: CLINIC | Age: 63
End: 2023-01-24
Payer: COMMERCIAL

## 2023-01-24 DIAGNOSIS — I25.5 ISCHEMIC CARDIOMYOPATHY: Primary | ICD-10-CM

## 2023-01-24 NOTE — TELEPHONE ENCOUNTER
Called patient to discuss ED visit from 22-Jan-2023.  ECG was unchanged and troponins were negative.  Pain may be similar to MI pain, but patient is not sure.  States it is less today.  Marked elevation in LFTs noted at ED visit.  Suspect that it is due to atorvastatin. Patient advised to stop atorvastatin and will repeat LFTs on in 6 days when she has evaluation for cardiac rehab. If LFTs improve, will need alternative lipid lowering therapy.  Patient acknowledged understanding of information and agreement with plan.

## 2023-01-30 ENCOUNTER — HOSPITAL ENCOUNTER (OUTPATIENT)
Dept: CARDIOLOGY | Facility: HOSPITAL | Age: 63
Discharge: HOME | End: 2023-01-30
Attending: STUDENT IN AN ORGANIZED HEALTH CARE EDUCATION/TRAINING PROGRAM
Payer: COMMERCIAL

## 2023-01-30 VITALS
WEIGHT: 111 LBS | HEART RATE: 64 BPM | SYSTOLIC BLOOD PRESSURE: 106 MMHG | HEIGHT: 65 IN | DIASTOLIC BLOOD PRESSURE: 70 MMHG | BODY MASS INDEX: 18.49 KG/M2

## 2023-01-30 DIAGNOSIS — Z98.61 POSTSURGICAL PERCUTANEOUS TRANSLUMINAL CORONARY ANGIOPLASTY STATUS: ICD-10-CM

## 2023-01-30 DIAGNOSIS — I25.10 ATHEROSCLEROSIS OF NATIVE CORONARY ARTERY OF NATIVE HEART WITHOUT ANGINA PECTORIS: ICD-10-CM

## 2023-01-30 LAB
CV STRESS BASE HR: 64 BPM
DIASTOLIC BLOOD PRESSURE: 70 MMHG
OHS CV CPX 1 MINUTE RECOVERY HEART RATE: 122 BPM
OHS CV CPX 85 PERCENT MAX PREDICTED HEART RATE MALE: 129
OHS CV CPX ABDOMINAL GIRTH: 28.5 CM
OHS CV CPX ANAEROBIC THRESHOLD DIASTOLIC BLOOD PRESSURE: 72 MMHG
OHS CV CPX ANAEROBIC THRESHOLD HEART RATE: 93
OHS CV CPX ANAEROBIC THRESHOLD RATE PRESSURE PRODUCT: NORMAL
OHS CV CPX ANAEROBIC THRESHOLD SYSTOLIC BLOOD PRESSURE: 116
OHS CV CPX DATA GRADE - AT: 6.4
OHS CV CPX DATA GRADE - PEAK: 16
OHS CV CPX DATA O2 SAT - PEAK: 95
OHS CV CPX DATA O2 SAT - REST: 95
OHS CV CPX DATA SPEED - AT: 2.2
OHS CV CPX DATA SPEED - PEAK: 4.1
OHS CV CPX DATA TIME - AT: 3.05
OHS CV CPX DATA TIME - PEAK: 7.55
OHS CV CPX DATA VE/VCO2 - AT: 35
OHS CV CPX DATA VE/VCO2 - PEAK: 34
OHS CV CPX DATA VE/VO2 - AT: 25
OHS CV CPX DATA VE/VO2 - PEAK: 35
OHS CV CPX DATA VO2 - AT: 16.6
OHS CV CPX DATA VO2 - PEAK: 20.8
OHS CV CPX DATA VO2 - REST: 5.9
OHS CV CPX ESTIMATED METS: 13
OHS CV CPX FEV1/FVC: 0.77
OHS CV CPX FORCED EXPIRATORY VOLUME: 1.42
OHS CV CPX FORCED VITAL CAPACITY (FVC): 1.84
OHS CV CPX HIGHEST VO: 27.5
OHS CV CPX MAX PREDICTED HEART RATE: 152
OHS CV CPX MAXIMAL VOLUNTARY VENTILATION (MVV) PREDICTED: 56.8
OHS CV CPX MAXIMAL VOLUNTARY VENTILATION (MVV): 37
OHS CV CPX MAXIUMUM EXERCISE VENTILATION (VE MAX): 38.3
OHS CV CPX PATIENT AGE: 61
OHS CV CPX PATIENT HEIGHT IN: 65
OHS CV CPX PATIENT IS FEMALE AGE 11-19: 0
OHS CV CPX PATIENT IS FEMALE AGE GREATER THAN 19: 1
OHS CV CPX PATIENT IS FEMALE AGE LESS THAN 11: 0
OHS CV CPX PATIENT IS FEMALE: 1
OHS CV CPX PATIENT IS MALE AGE 11-25: 0
OHS CV CPX PATIENT IS MALE AGE GREATER THAN 25: 0
OHS CV CPX PATIENT IS MALE AGE LESS THAN 11: 0
OHS CV CPX PATIENT IS MALE GREATER THAN 18: 0
OHS CV CPX PATIENT IS MALE LESS THAN OR EQUAL TO 18: 0
OHS CV CPX PATIENT IS MALE: 0
OHS CV CPX PATIENT WEIGHT RETURNED IN OZ: 1776
OHS CV CPX PEAK DIASTOLIC BLOOD PRESSURE: 79 MMHG
OHS CV CPX PEAK HEAR RATE: 126 BPM
OHS CV CPX PEAK RATE PRESSURE PRODUCT: NORMAL
OHS CV CPX PEAK SYSTOLIC BLOOD PRESSURE: 119 MMHG
OHS CV CPX PERCENT BODY FAT: 15.5
OHS CV CPX PERCENT MAX PREDICTED HEART RATE ACHIEVED: 83
OHS CV CPX PREDICTED VO2: 27.5 ML/KG/MIN
OHS CV CPX RATE PRESSURE PRODUCT PRESENTING: 6784
OHS CV CPX REST PET CO2: 27
OHS CV CPX VE/VCO2 SLOPE: 32.7
STRESS ECHO POST EXERCISE DUR MIN: 7 MINUTES
STRESS ECHO POST EXERCISE DUR SEC: 33 SECONDS
SYSTOLIC BLOOD PRESSURE: 106 MMHG

## 2023-01-30 PROCEDURE — 94621 CARDIOPULM EXERCISE TESTING: CPT | Mod: 26,,, | Performed by: INTERNAL MEDICINE

## 2023-01-30 PROCEDURE — 94621 CARDIOPULM EXERCISE TESTING: CPT

## 2023-01-30 PROCEDURE — 94621 CARDIOPULMONARY EXERCISE TESTING (CUPID ONLY): ICD-10-PCS | Mod: 26,,, | Performed by: INTERNAL MEDICINE

## 2023-01-30 NOTE — PROGRESS NOTES
"Session: Orientation   Cardiac Rehab Individual Treatment Plan - Initial Assessment      Patient Name: Nai Boo MRN: [id number]   : 1960   Age: 62 y.o.   Primary Diagnosis: PTCA/STENT  Date of Event: 22  EF: 35-40%  Risk Stratification: moderate  Referring Physician: TOMASZ   Exercise Assessment:     CPX/TM Date: 23 Results   RHR 64   Max    Peak VO2 (CPX only) 20.8   Actual METS (CPX only) 5.9   Estimated METS 13.0     Anthropometrics    Height 65 inches   Weight 111 lbs   BMI 18.5   Abdominal Girth 28.5   Body Composition 15.5%     ST Depression noted on Stress Test?:No  Angina with exercise?: No   Fall Risk: No   Assistive Devices:  independent   Currently exercising? Yes: Walking; Frequency: 2 days per week; Duration "about" 60 minutes  There were no limitations noted by the patient.      Exercise Plan:   Goals:  CR Exercise Goals: Attend Cardiac Rehab 3 times/week: In Progress  Home Aerobic Exercise: 2 additional days/week for 30-60 minutes: In Progress  Intensity of 12-15 on the Rate of Perceived Exertion (RPE) scale: In Progress  30% increase in entry estimated METS: 16.9 : In Progress  5 days/week for 30-60 minutes: In Progress    Intervention:   Discussed importance of regular attendance to cardiac rehab class    Exercise Prescription:  THR Range    Mode: Treadmill  Recumbent Bike  Upright Bike  Nustep  Elliptical   Frequency:  3 days/week   Duration:  30 - 60 minutes   Intensity:  12 - 15 RPE   Resistance Training:  Yes: 3 to 5 lb weights with 10-15 reps based on strength and range of motion assessment     Home Prescription:  Mode Aerobic   Frequency: 2- 3 days/week   Duration: 30-60 minutes   Resistance Training: None        Education:  Orientation to Equipment; verbalizes understanding; Date: 23  Exercise Recommendations; verbalizes understanding; Date: 23  Exercise Safety; verbalizes understanding; Date: 23  Class Preparation: verbalizes " understanding; Date: 2-2-23  Signs and symptoms to report: verbalizes understanding; Date: 2-2-23  Caffeine/Hydration: verbalizes understanding; Date: 2-2-23  Exercise Terminology: verbalizes understanding; Date: 2-2-23  Resistance Training: verbalizes understanding; Date: 2-2-23    Comments:  Nai was encouraged to continue walking at least 2 non-rehab days per week for at least 30 minutes in addition to attending Phase II cardiac rehab classes 3 days per week.  She stated understanding.     All consent forms were signed, proper attire and shoes were discussed.       Nai will begin Cardiac Rehab on Monday, February 6 at 3:30 pm.    The exercise prescription will be adjusted based on tolerance of exercise intensity by patient.    Rigoberto Ruth., CEP

## 2023-01-30 NOTE — PROGRESS NOTES
HISTORY: S/P PTCA/STENT (12-25-22), CAD, ICM, HTN, HLP, HX OF STEMI, EF=35-40%(12-27-22)    ANTHROPOMETRICS:     PRE   Height (in) 65   Weight (lb) 111   BMI 18.5   Abdominal Girth (in) 28.5   % Body Fat 15.5%       LAB RESULTS:    Lab Results   Component Value Date    HGB 14.7 01/30/2023     Lab Results   Component Value Date    HCT 45.2 01/30/2023     Lab Results   Component Value Date    MPV 10.4 01/30/2023       Lab Results   Component Value Date    CHOL 149 12/26/2022     Lab Results   Component Value Date    HDL 34 (L) 12/26/2022     Lab Results   Component Value Date    LDLCALC 93.0 12/26/2022     Lab Results   Component Value Date    TRIG 110 12/26/2022     Lab Results   Component Value Date    CHOLHDL 22.8 12/26/2022       Lab Results   Component Value Date    GLUF 87 01/30/2023     Lab Results   Component Value Date    HGBA1C 5.2 12/26/2022        Lab Results   Component Value Date    HSCRP 1.77 01/30/2023         FAUSTO SCORES:     PRE   Anxiety 7   Depression 2   Somatic 7   Hostility 3     SF-36 SCORES:     PRE   Physical Function 25   Social Function 7   Mental Health 18   Pain 6   Change in Health 1   Physical Role Limitation 0   Mental Role Limitation 2   Energy/Fatigue 9   Health Perceptions 19   Total Score 87     PHQ-9:  PHQ-9 Depression Patient Health Questionnaire 2/2/2023   Over the last two weeks how often have you been bothered by little interest or pleasure in doing things 1   Over the last two weeks how often have you been bothered by feeling down, depressed or hopeless 1   Over the last two weeks how often have you been bothered by trouble falling or staying asleep, or sleeping too much 2   Over the last two weeks how often have you been bothered by feeling tired or having little energy 2   Over the last two weeks how often have you been bothered by a poor appetite or overeating 1   Over the last two weeks how often have you been bothered by feeling bad about yourself - or that you are a  failure or have let yourself or your family down 0   Over the last two weeks how often have you been bothered by trouble concentrating on things, such as reading the newspaper or watching television 1   Over the last two weeks how often have you been bothered by moving or speaking so slowly that other people could have noticed. 1   Over the last two weeks how often have you been bothered by thoughts that you would be better off dead, or of hurting yourself 0   If you checked off any problems, how difficult have these problems made it for you to do your work, take care of things at home or get along with other people? Very difficult   Total Score 9             EDUCATION SCORES:     PRE   Education Score 70

## 2023-01-31 ENCOUNTER — TELEPHONE (OUTPATIENT)
Dept: CARDIAC REHAB | Facility: CLINIC | Age: 63
End: 2023-01-31
Payer: COMMERCIAL

## 2023-02-02 ENCOUNTER — CLINICAL SUPPORT (OUTPATIENT)
Dept: CARDIAC REHAB | Facility: CLINIC | Age: 63
End: 2023-02-02
Payer: COMMERCIAL

## 2023-02-02 VITALS
OXYGEN SATURATION: 99 % | RESPIRATION RATE: 16 BRPM | DIASTOLIC BLOOD PRESSURE: 70 MMHG | SYSTOLIC BLOOD PRESSURE: 102 MMHG | HEART RATE: 95 BPM

## 2023-02-02 DIAGNOSIS — Z98.61 POSTSURGICAL PERCUTANEOUS TRANSLUMINAL CORONARY ANGIOPLASTY STATUS: Primary | ICD-10-CM

## 2023-02-02 DIAGNOSIS — I21.3 ST ELEVATION MYOCARDIAL INFARCTION (STEMI), UNSPECIFIED ARTERY: ICD-10-CM

## 2023-02-02 DIAGNOSIS — I25.10 ATHEROSCLEROSIS OF NATIVE CORONARY ARTERY OF NATIVE HEART WITHOUT ANGINA PECTORIS: ICD-10-CM

## 2023-02-02 PROCEDURE — 99999 PR PBB SHADOW E&M-EST. PATIENT-LVL III: ICD-10-PCS | Mod: PBBFAC,,,

## 2023-02-02 PROCEDURE — 93798 PR CARDIAC REHAB/MONITOR: ICD-10-PCS | Mod: S$GLB,,, | Performed by: INTERNAL MEDICINE

## 2023-02-02 PROCEDURE — 93798 PHYS/QHP OP CAR RHAB W/ECG: CPT | Mod: S$GLB,,, | Performed by: INTERNAL MEDICINE

## 2023-02-02 PROCEDURE — 99999 PR PBB SHADOW E&M-EST. PATIENT-LVL III: CPT | Mod: PBBFAC,,,

## 2023-02-02 NOTE — PROGRESS NOTES
Session: Orientation   Cardiac Rehab Individual Treatment Plan - Initial Assessment      Patient Name: Nai Boo MRN: [id number]   : 1960   Age: 62 y.o.   Date of Event: 2022   Primary Diagnosis: PTCA/STENT    EF: 35-40%    Physical Assessment:   /70 (BP Location: Right arm, Patient Position: Standing)   Pulse 95   Resp 16   LMP 2021   SpO2 99%     ASSESSMENT:  Heart Sounds: regular rate and rhythm  Prosthetic Valve: No  Lung Sounds: normal air entry, lungs clear to auscultation  Capillary Refill: normal  Left Radial Pulse: Normal (+2)  Right Radial Pulse: Normal (+2)  Left Pedal Pulse: Normal (+2)  Right Pedal Pulse: Normal (+2)  Right Edema: none  Left Edema none  Strength: good  Range of Motion: full range of motion  Existing Limitations: N/A  Diabetic patient's foot examination comments: None -  Neither  Incisional site: N/A  Special needs: N/A    Psychosocial Assessment:   Outcome Survey Tools:    FAUSTO SCORES:   PRE   Anxiety 7   Depression 2   Somatic 7   Hostility 3     SF-36 SCORES:   PRE   Physical Function 25   Social Function 7   Mental Health 18   Pain 6   Change in Health 1   Physical Role Limitation 0   Mental Role Limitation 2   Energy/Fatigue 9   Health Perceptions 19   Total Score 87     PHQ-9:  PHQ-9 Depression Patient Health Questionnaire 2023   Over the last two weeks how often have you been bothered by little interest or pleasure in doing things 1   Over the last two weeks how often have you been bothered by feeling down, depressed or hopeless 1   Over the last two weeks how often have you been bothered by trouble falling or staying asleep, or sleeping too much 2   Over the last two weeks how often have you been bothered by feeling tired or having little energy 2   Over the last two weeks how often have you been bothered by a poor appetite or overeating 1   Over the last two weeks how often have you been bothered by feeling bad about yourself - or  that you are a failure or have let yourself or your family down 0   Over the last two weeks how often have you been bothered by trouble concentrating on things, such as reading the newspaper or watching television 1   Over the last two weeks how often have you been bothered by moving or speaking so slowly that other people could have noticed. 1   Over the last two weeks how often have you been bothered by thoughts that you would be better off dead, or of hurting yourself 0   If you checked off any problems, how difficult have these problems made it for you to do your work, take care of things at home or get along with other people? Very difficult   Total Score 9              Living Arrangements: Lives with spouse  Family Support: family and spouse  Self Reported: Effective Coping Skills  Displays: happiness and anxiety  Medication:  not at this time    Psychosocial Plan:   Goals:  Improved psychosocial coping strategies  Verbalizes coping mechanisms  Reduce manifestation of anxiety  Maintain positive support system  Maintain positive outlook  Improve overall quality of life    Interventions/Recommendations:  Discussed Results of Surveys  Patient to Self Report Emotional Changes at Session Check In  Recommend Physical Activity  Recommend Attending Education Lectures  Notify MD: Declined  Program Referral: Declined  Pharmaceutical Intervention/Therapy: Declined  Other Needs: not applicable  Stage of Readiness to Change: Preparation    Education:  Stress; verbalizes understanding; Date: 02/02/2023  Anxiety; verbalizes understanding; Date: 02/02/2023    Comments:  Pt denies any overwhelming stress or anxiety. Pt states she has on the job stress but that is all and has been feeling very tired prior to her heart event.  Patient has been instructed to notify staff in the event that circumstances worsen.  Patient verbalizes understanding.    Other Core Components/Risk Factors Assessment:   RISK FACTORS:  hyperlipidemia,  positive family history, sedentary lifestyle, stress, anxiety    Learning Barriers: None    Education Level:  Attended College/Technical School    Pre-test Score: 70    Medication Compliance: has been compliant with taking medications    Other Core Components/Risk Factors Plan:   Goals:  Decrease cholesterol level: In Progress  Increase exercise tolerance: In Progress  Increase knowledge of CAD: In Progress  Learn more about healthy eating: In Progress    Interventions/Recommendations:  Recommend regular attendance for Cardiac Rehab: Exercise and Education Lectures  Encourage medication compliance  Individual Education/ Counseling: Yes  Physician Referral: No    Education:    blood pressure meds, verbalizes understanding; Date: 02/02/2023  risk factors, verbalizes understanding; Date: 02/02/2023  stress, verbalizes understanding; Date: 02/02/2023         Education method adapted to patients education level and preferred method of learning.  Method: explanation    Comments:  Pt  acknowledges risk factors and is ready to improve her cardiovascular health.    Other Core Components/Hypertension Assessment:   Resting BP: 102/70  BP Readings from Last 1 Encounters:   02/02/23 102/70         BP Diagnosis: Normotensive  Patient reported symptoms: tiredness/fatigue    Other Core Components/Hypertension Plan:   Goals:  Blood Pressure <130/80    Interventions/Recommendations:  Med Card Reconciled: Yes  Encourage medication compliance  Encourage sodium reduction  Encourage weight loss  Recommend physical activity  Educate on contributory factors  Reduce stress, anxiety, anger, depression, and/or chronic pain  Encourage home blood pressure monitoring  Recommend daily weights  MD notified/Physician Referral: No    Education:    Hypertension; verbalizes understanding; Date: 02/02/2023  Risk Factors; verbalizes understanding; Date: 02/02/2023         Comments:  Pt instructed to take her B/P daily at home and keep a log.      Does  the patient have Heart Failure? No    Other Core Components/Tobacco Cessation Assessment:   Smoking Status: lifetime non-smoker  Smoking Cessation Barriers:  none  Stage of Readiness to Change: Maintenance    Other Core Components/Tobacco Cessation Plan:   Goals:  Maintain non-smoking status    Interventions:  Maintains non-smoking status    Education:    Risk Factors; verbalizes understanding; Date: 02/02/2023  Benefits of Cardiac Rehab; verbalizes understanding; Date: 02/02/2023         Comments:  Pt does not wish to smoke.    Discussed Cardiac Rehab program in depth with patient.  Medication list updated per patient & marked as reviewed.  Patient has been instructed to notify staff of any problems while attending rehab (ie: chest pain, shortness of breath, lightheadedness, dizziness).  Patient has been instructed to monitor blood pressure readings outside of rehab & to keep a daily log of the readings.  Patient verbalizes understanding.     Isaiah Rosa RN

## 2023-02-02 NOTE — PROGRESS NOTES
Orientation   Cardiac Rehab Individual Treatment Plan - Initial Assessment      Patient Name: Nai Boo MRN: [id number]   : 1960   Age: 62 y.o.   Primary Diagnosis: s/p PTCA/stent, CAD, HTN, HLD, ICM, h/o STEMI    Nutrition Assessment:     Anthropometrics    Height 65 inches   Weight 111 lbs   BMI 18.5   Abdominal Girth 28.5   Body Composition 15.5       Drug Allergies and Intolerances:  Review of patient's allergies indicates:   Allergen Reactions    Sulfa (sulfonamide antibiotics) Swelling       Food Allergies and Intolerances:  NA    Past Medical History:  Past Medical History:   Diagnosis Date    Hypertension     Trigger finger        Past Surgical History:  Past Surgical History:   Procedure Laterality Date    AMPUTATION Left     finger amputation x2    BREAST BIOPSY Left 2016    benign    CATHETERIZATION OF BOTH LEFT AND RIGHT HEART N/A 2022    Procedure: CATHETERIZATION, HEART, BOTH LEFT AND RIGHT;  Surgeon: Gilmar Ash MD;  Location: [facility] CATH LAB;  Service: Cardiology;  Laterality: N/A;     SECTION      EYE SURGERY      cataracts    INSERTION OF INTRA-AORTIC BALLOON ASSIST DEVICE  2022    Procedure: INSERTION, INTRA-AORTIC BALLOON PUMP;  Surgeon: Gilmar Ash MD;  Location: [facility] CATH LAB;  Service: Cardiology;;    SINUS SURGERY      STENT, DRUG ELUTING, SINGLE VESSEL, CORONARY  2022    Procedure: Stent, Drug Eluting, Single Vessel, Coronary;  Surgeon: Gilmar Ash MD;  Location: [facility] CATH LAB;  Service: Cardiology;;    TRIGGER FINGER RELEASE Right 3/15/2021    Procedure: RELEASE, TRIGGER THUMB;  Surgeon: Ailyn Renee MD;  Location: [facility];  Service: Orthopedics;  Laterality: Right;  rt thumb       Medications:  Current Outpatient Medications   Medication Sig    aspirin (ECOTRIN) 81 MG EC tablet Take 1 tablet (81 mg total) by mouth once daily.    atorvastatin (LIPITOR) 80 MG tablet Take 1 tablet (80 mg total) by mouth once daily.     carboxymethylcellulose sodium (THERATEARS OPHT) Apply to eye.    metoprolol succinate (TOPROL-XL) 50 MG 24 hr tablet Take 1 tablet (50 mg total) by mouth once daily.    multivitamin capsule Take 1 capsule by mouth once daily.    prasugreL (EFFIENT) 5 mg Tab Take 1 tablet (5 mg total) by mouth once daily.    spironolactone (ALDACTONE) 25 MG tablet Take 1 tablet (25 mg total) by mouth once daily.    valsartan (DIOVAN) 40 MG tablet Take 1 tablet (40 mg total) by mouth once daily.     No current facility-administered medications for this visit.       Vitamins and Supplements:  MVI    Labs:  Patient confirms she is taking nothing for cholesterol control.  (Lipitor on hold)  Lab Results   Component Value Date    CHOL 149 12/26/2022     Lab Results   Component Value Date    HDL 34 (L) 12/26/2022     Lab Results   Component Value Date    LDLCALC 93.0 12/26/2022     Lab Results   Component Value Date    TRIG 110 12/26/2022     Lab Results   Component Value Date    CHOLHDL 22.8 12/26/2022         Lab Results   Component Value Date    GLUF 87 01/30/2023     Lab Results   Component Value Date    HGBA1C 5.2 12/26/2022       Nutrition/Diet History:  Patient eats 2-3 meals daily.    Seasons food with salt substitute/herbs/pepper.  Patient denies use of a salt shaker at the table on prepared foods.   Dines out 1-2 per week at restaurants     Chooses fried foods rarely    Chooses fish 3-4 time(s) per week.   Beverages:  water unsweet tea  Alcohol: social drinker    24 Hour Recall:  Breakfast: 2 slices mcfadden, wheat toast, scrambled egg  Lunch:tuna/devlin on wheat with cherry tomatoes  Dinner:roast garlic tomato pizza on cauliflower crust, mixed green salad  Other: slice sommer cake    Difficulty Chewing or Swallowing: no  Current Exercise: See Exercise Physiologist Note  Food Safety/Food Preparation:  shared with   Living Arrangements/Family Support: Lives with spouse  Cultural/Spiritual/Personal Preferences: not applicable    Barriers to Education: none identified  Stage of Change Related to Diet Habits: Action    Nutrition Diagnosis:  Food and nutrition related knowledge deficit related to the lack of prior nutrition education as evidenced by diet history and 24 hour recall    Nutrition Plan:   Goals:  LDL-C < 70 (for high risk patients)  Hgb A1c < 7%  BMI < 25 and abdominal girth < 40M/<35 F  2 gram sodium, Mediterranean diet  Rehab weight goal: maintenance  Fish intake (non-fried varieties) to a goal of 2-3 servings per week.   Increase fruit and vegetable intake    Interventions/Recommendations:  Lab results reviewed and discussed  Nutrition Prescription:  Total Energy Estimated Needs: 9440-7137 Kcal/d for weight maintenance  Method for Estimating Needs: 25-30kcal/kg  Total Protein Estimated Needs: 40-61 g/d  Method for Estimating Needs: 0.8-1.2 g/Kg BW  Total Fluid Estimated Needs: 1 mL/Kcal  Dietitian Consult: No  Patient to participate in Cardiac Rehab sessions three times a week  Weekly Dietitian Weight Check  Encouraged patient to complete 3 day food diary  Follow Up Plan for Ongoing Self-Management Support    Education:  Mediterranean Diet; verbalizes understanding; Date: 2/2/23  Person taught: patient  Preferred Learning Method: Verbal and written  Education Needed/Provided: Nutrition counseling and education related to cardiac rehabilitation  Education Method: Weekly nutrition lectures on the Mediterranean diet, cooking, shopping, and dining out  Written Materials Provided: 3 Day Food Record, Introduction to Mediterranean Diet  Strategies Implemented: Motivational interviewing, Goal setting, Self-Monitoring, and Problem Solving    Comments:   Discussed ways to incorporate healthy snacks, eating on a schedule, and monitoring sodium intake for heart health.  Discussed ways to increase produce intake as pt states she has been struggling with this    Diabetes  Is the patient diabetic? No      RD contact information  provided.      Allison Barcenas MS, RDN/LDN

## 2023-02-06 ENCOUNTER — CLINICAL SUPPORT (OUTPATIENT)
Dept: CARDIAC REHAB | Facility: CLINIC | Age: 63
End: 2023-02-06
Payer: COMMERCIAL

## 2023-02-06 DIAGNOSIS — Z98.61 POSTSURGICAL PERCUTANEOUS TRANSLUMINAL CORONARY ANGIOPLASTY STATUS: Primary | ICD-10-CM

## 2023-02-06 DIAGNOSIS — I25.10 ATHEROSCLEROSIS OF NATIVE CORONARY ARTERY OF NATIVE HEART WITHOUT ANGINA PECTORIS: ICD-10-CM

## 2023-02-06 PROCEDURE — 93798 PR CARDIAC REHAB/MONITOR: ICD-10-PCS | Mod: S$GLB,,, | Performed by: INTERNAL MEDICINE

## 2023-02-06 PROCEDURE — 93798 PHYS/QHP OP CAR RHAB W/ECG: CPT | Mod: S$GLB,,, | Performed by: INTERNAL MEDICINE

## 2023-02-08 ENCOUNTER — CLINICAL SUPPORT (OUTPATIENT)
Dept: CARDIAC REHAB | Facility: CLINIC | Age: 63
End: 2023-02-08
Payer: COMMERCIAL

## 2023-02-08 DIAGNOSIS — I25.10 CORONARY ATHEROSCLEROSIS OF NATIVE CORONARY ARTERY: ICD-10-CM

## 2023-02-08 DIAGNOSIS — Z98.61 POSTSURGICAL PERCUTANEOUS TRANSLUMINAL CORONARY ANGIOPLASTY STATUS: Primary | ICD-10-CM

## 2023-02-08 PROCEDURE — 93798 PHYS/QHP OP CAR RHAB W/ECG: CPT | Mod: S$GLB,,, | Performed by: INTERNAL MEDICINE

## 2023-02-08 PROCEDURE — 93798 PR CARDIAC REHAB/MONITOR: ICD-10-PCS | Mod: S$GLB,,, | Performed by: INTERNAL MEDICINE

## 2023-02-13 ENCOUNTER — CLINICAL SUPPORT (OUTPATIENT)
Dept: CARDIAC REHAB | Facility: CLINIC | Age: 63
End: 2023-02-13
Payer: COMMERCIAL

## 2023-02-13 DIAGNOSIS — Z98.61 POSTSURGICAL PERCUTANEOUS TRANSLUMINAL CORONARY ANGIOPLASTY STATUS: Primary | ICD-10-CM

## 2023-02-13 DIAGNOSIS — I25.10 ATHEROSCLEROSIS OF NATIVE CORONARY ARTERY OF NATIVE HEART, UNSPECIFIED WHETHER ANGINA PRESENT: ICD-10-CM

## 2023-02-13 PROCEDURE — 93798 PR CARDIAC REHAB/MONITOR: ICD-10-PCS | Mod: S$GLB,,, | Performed by: INTERNAL MEDICINE

## 2023-02-13 PROCEDURE — 93798 PHYS/QHP OP CAR RHAB W/ECG: CPT | Mod: S$GLB,,, | Performed by: INTERNAL MEDICINE

## 2023-02-15 ENCOUNTER — CLINICAL SUPPORT (OUTPATIENT)
Dept: CARDIAC REHAB | Facility: CLINIC | Age: 63
End: 2023-02-15
Payer: COMMERCIAL

## 2023-02-15 DIAGNOSIS — Z98.61 POSTSURGICAL PERCUTANEOUS TRANSLUMINAL CORONARY ANGIOPLASTY STATUS: Primary | ICD-10-CM

## 2023-02-15 DIAGNOSIS — I25.10 ATHEROSCLEROSIS OF NATIVE CORONARY ARTERY OF NATIVE HEART, UNSPECIFIED WHETHER ANGINA PRESENT: ICD-10-CM

## 2023-02-15 PROCEDURE — 93798 PR CARDIAC REHAB/MONITOR: ICD-10-PCS | Mod: S$GLB,,, | Performed by: INTERNAL MEDICINE

## 2023-02-15 PROCEDURE — 93798 PHYS/QHP OP CAR RHAB W/ECG: CPT | Mod: S$GLB,,, | Performed by: INTERNAL MEDICINE

## 2023-02-17 ENCOUNTER — CLINICAL SUPPORT (OUTPATIENT)
Dept: CARDIAC REHAB | Facility: CLINIC | Age: 63
End: 2023-02-17
Payer: COMMERCIAL

## 2023-02-17 DIAGNOSIS — I25.10 ATHEROSCLEROSIS OF NATIVE CORONARY ARTERY OF NATIVE HEART WITHOUT ANGINA PECTORIS: ICD-10-CM

## 2023-02-17 DIAGNOSIS — Z98.61 POSTSURGICAL PERCUTANEOUS TRANSLUMINAL CORONARY ANGIOPLASTY STATUS: Primary | ICD-10-CM

## 2023-02-17 PROCEDURE — 93798 PHYS/QHP OP CAR RHAB W/ECG: CPT | Mod: S$GLB,,, | Performed by: INTERNAL MEDICINE

## 2023-02-17 PROCEDURE — 93798 PR CARDIAC REHAB/MONITOR: ICD-10-PCS | Mod: S$GLB,,, | Performed by: INTERNAL MEDICINE

## 2023-02-20 ENCOUNTER — CLINICAL SUPPORT (OUTPATIENT)
Dept: CARDIAC REHAB | Facility: CLINIC | Age: 63
End: 2023-02-20
Payer: COMMERCIAL

## 2023-02-20 DIAGNOSIS — I25.10 CORONARY ARTERY DISEASE, UNSPECIFIED VESSEL OR LESION TYPE, UNSPECIFIED WHETHER ANGINA PRESENT, UNSPECIFIED WHETHER NATIVE OR TRANSPLANTED HEART: ICD-10-CM

## 2023-02-20 DIAGNOSIS — Z98.61 POSTSURGICAL PERCUTANEOUS TRANSLUMINAL CORONARY ANGIOPLASTY STATUS: Primary | ICD-10-CM

## 2023-02-20 PROCEDURE — 93798 PHYS/QHP OP CAR RHAB W/ECG: CPT | Mod: S$GLB,,, | Performed by: INTERNAL MEDICINE

## 2023-02-20 PROCEDURE — 93798 PR CARDIAC REHAB/MONITOR: ICD-10-PCS | Mod: S$GLB,,, | Performed by: INTERNAL MEDICINE

## 2023-02-22 ENCOUNTER — CLINICAL SUPPORT (OUTPATIENT)
Dept: CARDIAC REHAB | Facility: CLINIC | Age: 63
End: 2023-02-22
Payer: COMMERCIAL

## 2023-02-22 DIAGNOSIS — I25.10 ATHEROSCLEROSIS OF NATIVE CORONARY ARTERY OF NATIVE HEART WITHOUT ANGINA PECTORIS: ICD-10-CM

## 2023-02-22 DIAGNOSIS — Z98.61 POSTSURGICAL PERCUTANEOUS TRANSLUMINAL CORONARY ANGIOPLASTY STATUS: Primary | ICD-10-CM

## 2023-02-23 ENCOUNTER — DOCUMENTATION ONLY (OUTPATIENT)
Dept: CARDIAC REHAB | Facility: CLINIC | Age: 63
End: 2023-02-23
Payer: COMMERCIAL

## 2023-02-23 NOTE — PROGRESS NOTES
"Miss Trimble has completed 8 out of 36 exercise session of Phase II cardiac rehab.  A follow up reassessment will be completed at 12 sessions.     Session: Orientation   Cardiac Rehab Individual Treatment Plan - Initial Assessment      Patient Name: Nai Boo MRN: [id number]   : 1960   Age: 62 y.o.   Primary Diagnosis: PTCA/STENT  Date of Event: 22  EF: 35-40%  Risk Stratification: moderate  Referring Physician: TOMASZ   Exercise Assessment:     CPX/TM Date: 23 Results   RHR 64   Max    Peak VO2 (CPX only) 20.8   Actual METS (CPX only) 5.9   Estimated METS 13.0     Anthropometrics    Height 65 inches   Weight 111 lbs   BMI 18.5   Abdominal Girth 28.5   Body Composition 15.5%     ST Depression noted on Stress Test?:No  Angina with exercise?: No   Fall Risk: No   Assistive Devices:  independent   Currently exercising? Yes: Walking; Frequency: 2 days per week; Duration "about" 60 minutes  There were no limitations noted by the patient.      Exercise Plan:   Goals:  CR Exercise Goals: Attend Cardiac Rehab 3 times/week: In Progress  Home Aerobic Exercise: 2 additional days/week for 30-60 minutes: In Progress  Intensity of 12-15 on the Rate of Perceived Exertion (RPE) scale: In Progress  30% increase in entry estimated METS: 16.9 : In Progress  5 days/week for 30-60 minutes: In Progress    Intervention:   Discussed importance of regular attendance to cardiac rehab class    Exercise Prescription:  THR Range    Mode: Treadmill  Recumbent Bike  Upright Bike  Nustep  Elliptical   Frequency:  3 days/week   Duration:  30 - 60 minutes   Intensity:  12 - 15 RPE   Resistance Training:  Yes: 3 to 5 lb weights with 10-15 reps based on strength and range of motion assessment     Home Prescription:  Mode Aerobic   Frequency: 2- 3 days/week   Duration: 30-60 minutes   Resistance Training: None        Education:  Orientation to Equipment; verbalizes understanding; Date: 23  Exercise " Recommendations; verbalizes understanding; Date: 23  Exercise Safety; verbalizes understanding; Date: 23  Class Preparation: verbalizes understanding; Date: 23  Signs and symptoms to report: verbalizes understanding; Date: 23  Caffeine/Hydration: verbalizes understanding; Date: 23  Exercise Terminology: verbalizes understanding; Date: 23  Resistance Training: verbalizes understanding; Date: 23    Comments:  Nai was encouraged to continue walking at least 2 non-rehab days per week for at least 30 minutes in addition to attending Phase II cardiac rehab classes 3 days per week.  She stated understanding.     All consent forms were signed, proper attire and shoes were discussed.       Nai will begin Cardiac Rehab on  at 3:30 pm.    The exercise prescription will be adjusted based on tolerance of exercise intensity by patient.    Johnathon Ruth, CEP     Orientation   Cardiac Rehab Individual Treatment Plan - Initial Assessment      Patient Name: Nai Boo MRN: [id number]   : 1960   Age: 62 y.o.   Primary Diagnosis: s/p PTCA/stent, CAD, HTN, HLD, ICM, h/o STEMI    Nutrition Assessment:     Anthropometrics    Height 65 inches   Weight 111 lbs   BMI 18.5   Abdominal Girth 28.5   Body Composition 15.5       Drug Allergies and Intolerances:  Review of patient's allergies indicates:   Allergen Reactions    Sulfa (sulfonamide antibiotics) Swelling       Food Allergies and Intolerances:  NA    Past Medical History:  Past Medical History:   Diagnosis Date    Hypertension     Trigger finger        Past Surgical History:  Past Surgical History:   Procedure Laterality Date    AMPUTATION Left     finger amputation x2    BREAST BIOPSY Left 2016    benign    CATHETERIZATION OF BOTH LEFT AND RIGHT HEART N/A 2022    Procedure: CATHETERIZATION, HEART, BOTH LEFT AND RIGHT;  Surgeon: Gilmar Ash MD;  Location: [facility] CATH LAB;  Service: Cardiology;   Laterality: N/A;     SECTION      EYE SURGERY      cataracts    INSERTION OF INTRA-AORTIC BALLOON ASSIST DEVICE  2022    Procedure: INSERTION, INTRA-AORTIC BALLOON PUMP;  Surgeon: Gilmar Ash MD;  Location: [facility] CATH LAB;  Service: Cardiology;;    SINUS SURGERY      STENT, DRUG ELUTING, SINGLE VESSEL, CORONARY  2022    Procedure: Stent, Drug Eluting, Single Vessel, Coronary;  Surgeon: Gilmar Ash MD;  Location: [facility] CATH LAB;  Service: Cardiology;;    TRIGGER FINGER RELEASE Right 3/15/2021    Procedure: RELEASE, TRIGGER THUMB;  Surgeon: Ailyn Renee MD;  Location: [facility] OR;  Service: Orthopedics;  Laterality: Right;  rt thumb       Medications:  Current Outpatient Medications   Medication Sig    aspirin (ECOTRIN) 81 MG EC tablet Take 1 tablet (81 mg total) by mouth once daily.    atorvastatin (LIPITOR) 80 MG tablet Take 1 tablet (80 mg total) by mouth once daily. (Patient not taking: Reported on 2023)    carboxymethylcellulose sodium (THERATEARS OPHT) Apply to eye.    metoprolol succinate (TOPROL-XL) 50 MG 24 hr tablet Take 1 tablet (50 mg total) by mouth once daily.    multivitamin capsule Take 1 capsule by mouth once daily.    prasugreL (EFFIENT) 5 mg Tab Take 1 tablet (5 mg total) by mouth once daily.    spironolactone (ALDACTONE) 25 MG tablet Take 1 tablet (25 mg total) by mouth once daily.    valsartan (DIOVAN) 40 MG tablet Take 1 tablet (40 mg total) by mouth once daily.     No current facility-administered medications for this visit.       Vitamins and Supplements:  MVI    Labs:  Patient confirms she is taking nothing for cholesterol control.  (Lipitor on hold)  Lab Results   Component Value Date    CHOL 149 2022     Lab Results   Component Value Date    HDL 34 (L) 2022     Lab Results   Component Value Date    LDLCALC 93.0 2022     Lab Results   Component Value Date    TRIG 110 2022     Lab Results   Component Value Date    CHOLHDL 22.8  12/26/2022         Lab Results   Component Value Date    GLUF 87 01/30/2023     Lab Results   Component Value Date    HGBA1C 5.2 12/26/2022       Nutrition/Diet History:  Patient eats 2-3 meals daily.    Seasons food with salt substitute/herbs/pepper.  Patient denies use of a salt shaker at the table on prepared foods.   Dines out 1-2 per week at restaurants     Chooses fried foods rarely    Chooses fish 3-4 time(s) per week.   Beverages:  water unsweet tea  Alcohol: social drinker    24 Hour Recall:  Breakfast: 2 slices mcfadden, wheat toast, scrambled egg  Lunch:tuna/devlin on wheat with cherry tomatoes  Dinner:roast garlic tomato pizza on cauliflower crust, mixed green salad  Other: slice sommer cake    Difficulty Chewing or Swallowing: no  Current Exercise: See Exercise Physiologist Note  Food Safety/Food Preparation:  shared with   Living Arrangements/Family Support: Lives with spouse  Cultural/Spiritual/Personal Preferences: not applicable   Barriers to Education: none identified  Stage of Change Related to Diet Habits: Action    Nutrition Diagnosis:  Food and nutrition related knowledge deficit related to the lack of prior nutrition education as evidenced by diet history and 24 hour recall    Nutrition Plan:   Goals:  LDL-C < 70 (for high risk patients)  Hgb A1c < 7%  BMI < 25 and abdominal girth < 40M/<35 F  2 gram sodium, Mediterranean diet  Rehab weight goal: maintenance  Fish intake (non-fried varieties) to a goal of 2-3 servings per week.   Increase fruit and vegetable intake    Interventions/Recommendations:  Lab results reviewed and discussed  Nutrition Prescription:  Total Energy Estimated Needs: 5653-5940 Kcal/d for weight maintenance  Method for Estimating Needs: 25-30kcal/kg  Total Protein Estimated Needs: 40-61 g/d  Method for Estimating Needs: 0.8-1.2 g/Kg BW  Total Fluid Estimated Needs: 1 mL/Kcal  Dietitian Consult: No  Patient to participate in Cardiac Rehab sessions three times a week  Weekly  Dietitian Weight Check  Encouraged patient to complete 3 day food diary  Follow Up Plan for Ongoing Self-Management Support    Education:  Mediterranean Diet; verbalizes understanding; Date: 23  Person taught: patient  Preferred Learning Method: Verbal and written  Education Needed/Provided: Nutrition counseling and education related to cardiac rehabilitation  Education Method: Weekly nutrition lectures on the Mediterranean diet, cooking, shopping, and dining out  Written Materials Provided: 3 Day Food Record, Introduction to Mediterranean Diet  Strategies Implemented: Motivational interviewing, Goal setting, Self-Monitoring, and Problem Solving    Comments:   Discussed ways to incorporate healthy snacks, eating on a schedule, and monitoring sodium intake for heart health.  Discussed ways to increase produce intake as pt states she has been struggling with this    Diabetes  Is the patient diabetic? No      RD contact information provided.      Allison Barcenas MS, RDN/LDN     Session: Orientation   Cardiac Rehab Individual Treatment Plan - Initial Assessment      Patient Name: Nai Boo MRN: [id number]   : 1960   Age: 62 y.o.   Date of Event: 2022   Primary Diagnosis: PTCA/STENT    EF: 35-40%    Physical Assessment:   LMP 2021     ASSESSMENT:  Heart Sounds: regular rate and rhythm  Prosthetic Valve: No  Lung Sounds: normal air entry, lungs clear to auscultation  Capillary Refill: normal  Left Radial Pulse: Normal (+2)  Right Radial Pulse: Normal (+2)  Left Pedal Pulse: Normal (+2)  Right Pedal Pulse: Normal (+2)  Right Edema: none  Left Edema none  Strength: good  Range of Motion: full range of motion  Existing Limitations: N/A  Diabetic patient's foot examination comments: None -  Neither  Incisional site: N/A  Special needs: N/A    Psychosocial Assessment:   Outcome Survey Tools:    FAUSTO SCORES:   PRE   Anxiety 7   Depression 2   Somatic 7   Hostility 3     SF-36 SCORES:   PRE    Physical Function 25   Social Function 7   Mental Health 18   Pain 6   Change in Health 1   Physical Role Limitation 0   Mental Role Limitation 2   Energy/Fatigue 9   Health Perceptions 19   Total Score 87     PHQ-9:  PHQ-9 Depression Patient Health Questionnaire 2/2/2023   Over the last two weeks how often have you been bothered by little interest or pleasure in doing things 1   Over the last two weeks how often have you been bothered by feeling down, depressed or hopeless 1   Over the last two weeks how often have you been bothered by trouble falling or staying asleep, or sleeping too much 2   Over the last two weeks how often have you been bothered by feeling tired or having little energy 2   Over the last two weeks how often have you been bothered by a poor appetite or overeating 1   Over the last two weeks how often have you been bothered by feeling bad about yourself - or that you are a failure or have let yourself or your family down 0   Over the last two weeks how often have you been bothered by trouble concentrating on things, such as reading the newspaper or watching television 1   Over the last two weeks how often have you been bothered by moving or speaking so slowly that other people could have noticed. 1   Over the last two weeks how often have you been bothered by thoughts that you would be better off dead, or of hurting yourself 0   If you checked off any problems, how difficult have these problems made it for you to do your work, take care of things at home or get along with other people? Very difficult   Total Score 9              Living Arrangements: Lives with spouse  Family Support: family and spouse  Self Reported: Effective Coping Skills  Displays: happiness and anxiety  Medication:  not at this time    Psychosocial Plan:   Goals:  Improved psychosocial coping strategies  Verbalizes coping mechanisms  Reduce manifestation of anxiety  Maintain positive support system  Maintain positive  outlook  Improve overall quality of life    Interventions/Recommendations:  Discussed Results of Surveys  Patient to Self Report Emotional Changes at Session Check In  Recommend Physical Activity  Recommend Attending Education Lectures  Notify MD: Declined  Program Referral: Declined  Pharmaceutical Intervention/Therapy: Declined  Other Needs: not applicable  Stage of Readiness to Change: Preparation    Education:  Stress; verbalizes understanding; Date: 02/02/2023  Anxiety; verbalizes understanding; Date: 02/02/2023    Comments:  Pt denies any overwhelming stress or anxiety. Pt states she has on the job stress but that is all and has been feeling very tired prior to her heart event.  Patient has been instructed to notify staff in the event that circumstances worsen.  Patient verbalizes understanding.    Other Core Components/Risk Factors Assessment:   RISK FACTORS:  hyperlipidemia, positive family history, sedentary lifestyle, stress, anxiety    Learning Barriers: None    Education Level:  Attended College/Technical School    Pre-test Score: 70    Medication Compliance: has been compliant with taking medications    Other Core Components/Risk Factors Plan:   Goals:  Decrease cholesterol level: In Progress  Increase exercise tolerance: In Progress  Increase knowledge of CAD: In Progress  Learn more about healthy eating: In Progress    Interventions/Recommendations:  Recommend regular attendance for Cardiac Rehab: Exercise and Education Lectures  Encourage medication compliance  Individual Education/ Counseling: Yes  Physician Referral: No    Education:    blood pressure meds, verbalizes understanding; Date: 02/02/2023  risk factors, verbalizes understanding; Date: 02/02/2023  stress, verbalizes understanding; Date: 02/02/2023         Education method adapted to patients education level and preferred method of learning.  Method: explanation    Comments:  Pt  acknowledges risk factors and is ready to improve her  cardiovascular health.    Other Core Components/Hypertension Assessment:   Resting BP: 102/70  BP Readings from Last 1 Encounters:   02/02/23 102/70         BP Diagnosis: Normotensive  Patient reported symptoms: tiredness/fatigue    Other Core Components/Hypertension Plan:   Goals:  Blood Pressure <130/80    Interventions/Recommendations:  Med Card Reconciled: Yes  Encourage medication compliance  Encourage sodium reduction  Encourage weight loss  Recommend physical activity  Educate on contributory factors  Reduce stress, anxiety, anger, depression, and/or chronic pain  Encourage home blood pressure monitoring  Recommend daily weights  MD notified/Physician Referral: No    Education:    Hypertension; verbalizes understanding; Date: 02/02/2023  Risk Factors; verbalizes understanding; Date: 02/02/2023         Comments:  Pt instructed to take her B/P daily at home and keep a log.      Does the patient have Heart Failure? No    Other Core Components/Tobacco Cessation Assessment:   Smoking Status: lifetime non-smoker  Smoking Cessation Barriers:  none  Stage of Readiness to Change: Maintenance    Other Core Components/Tobacco Cessation Plan:   Goals:  Maintain non-smoking status    Interventions:  Maintains non-smoking status    Education:    Risk Factors; verbalizes understanding; Date: 02/02/2023  Benefits of Cardiac Rehab; verbalizes understanding; Date: 02/02/2023         Comments:  Pt does not wish to smoke.    Discussed Cardiac Rehab program in depth with patient.  Medication list updated per patient & marked as reviewed.  Patient has been instructed to notify staff of any problems while attending rehab (ie: chest pain, shortness of breath, lightheadedness, dizziness).  Patient has been instructed to monitor blood pressure readings outside of rehab & to keep a daily log of the readings.  Patient verbalizes understanding.     Isaiah Rosa RN

## 2023-02-24 ENCOUNTER — CLINICAL SUPPORT (OUTPATIENT)
Dept: CARDIAC REHAB | Facility: CLINIC | Age: 63
End: 2023-02-24
Payer: COMMERCIAL

## 2023-02-24 DIAGNOSIS — Z98.61 POSTSURGICAL PERCUTANEOUS TRANSLUMINAL CORONARY ANGIOPLASTY STATUS: Primary | ICD-10-CM

## 2023-02-24 DIAGNOSIS — I25.10 ATHEROSCLEROSIS OF NATIVE CORONARY ARTERY OF NATIVE HEART WITHOUT ANGINA PECTORIS: ICD-10-CM

## 2023-02-24 PROCEDURE — 93798 PR CARDIAC REHAB/MONITOR: ICD-10-PCS | Mod: S$GLB,,, | Performed by: INTERNAL MEDICINE

## 2023-02-24 PROCEDURE — 93798 PHYS/QHP OP CAR RHAB W/ECG: CPT | Mod: S$GLB,,, | Performed by: INTERNAL MEDICINE

## 2023-02-27 ENCOUNTER — CLINICAL SUPPORT (OUTPATIENT)
Dept: CARDIAC REHAB | Facility: CLINIC | Age: 63
End: 2023-02-27
Payer: COMMERCIAL

## 2023-02-27 DIAGNOSIS — Z98.61 POSTSURGICAL PERCUTANEOUS TRANSLUMINAL CORONARY ANGIOPLASTY STATUS: Primary | ICD-10-CM

## 2023-02-27 DIAGNOSIS — I25.10 CORONARY ARTERY DISEASE, UNSPECIFIED VESSEL OR LESION TYPE, UNSPECIFIED WHETHER ANGINA PRESENT, UNSPECIFIED WHETHER NATIVE OR TRANSPLANTED HEART: ICD-10-CM

## 2023-02-27 PROCEDURE — 93798 PR CARDIAC REHAB/MONITOR: ICD-10-PCS | Mod: S$GLB,,, | Performed by: INTERNAL MEDICINE

## 2023-02-27 PROCEDURE — 93798 PHYS/QHP OP CAR RHAB W/ECG: CPT | Mod: S$GLB,,, | Performed by: INTERNAL MEDICINE

## 2023-03-01 ENCOUNTER — CLINICAL SUPPORT (OUTPATIENT)
Dept: CARDIAC REHAB | Facility: CLINIC | Age: 63
End: 2023-03-01
Payer: COMMERCIAL

## 2023-03-01 DIAGNOSIS — Z98.61 POSTSURGICAL PERCUTANEOUS TRANSLUMINAL CORONARY ANGIOPLASTY STATUS: Primary | ICD-10-CM

## 2023-03-01 DIAGNOSIS — I25.10 ATHEROSCLEROSIS OF NATIVE CORONARY ARTERY OF NATIVE HEART, UNSPECIFIED WHETHER ANGINA PRESENT: ICD-10-CM

## 2023-03-01 PROCEDURE — 93798 PR CARDIAC REHAB/MONITOR: ICD-10-PCS | Mod: S$GLB,,, | Performed by: INTERNAL MEDICINE

## 2023-03-01 PROCEDURE — 93798 PHYS/QHP OP CAR RHAB W/ECG: CPT | Mod: S$GLB,,, | Performed by: INTERNAL MEDICINE

## 2023-03-06 ENCOUNTER — CLINICAL SUPPORT (OUTPATIENT)
Dept: CARDIAC REHAB | Facility: CLINIC | Age: 63
End: 2023-03-06
Payer: COMMERCIAL

## 2023-03-06 ENCOUNTER — DOCUMENTATION ONLY (OUTPATIENT)
Dept: CARDIAC REHAB | Facility: CLINIC | Age: 63
End: 2023-03-06

## 2023-03-06 DIAGNOSIS — I25.10 ATHEROSCLEROSIS OF NATIVE CORONARY ARTERY OF NATIVE HEART WITHOUT ANGINA PECTORIS: ICD-10-CM

## 2023-03-06 DIAGNOSIS — Z98.61 POSTSURGICAL PERCUTANEOUS TRANSLUMINAL CORONARY ANGIOPLASTY STATUS: Primary | ICD-10-CM

## 2023-03-06 PROCEDURE — 93798 PHYS/QHP OP CAR RHAB W/ECG: CPT | Mod: S$GLB,,, | Performed by: INTERNAL MEDICINE

## 2023-03-06 PROCEDURE — 93798 PR CARDIAC REHAB/MONITOR: ICD-10-PCS | Mod: S$GLB,,, | Performed by: INTERNAL MEDICINE

## 2023-03-06 NOTE — PROGRESS NOTES
Nai has completed 12 out of 36 exercise session of Phase II cardiac rehab.  A follow up reassessment will be completed at 24 sessions.     12 Session Follow Up   Cardiac Rehab Individual Treatment Plan - Reassessment      Patient Name: Nai Boo MRN: [id number]   : 1960   Age: 62 y.o.   Primary Diagnosis: PTCA/STENT Date of Event: 22   EF: 35-40%  Risk Stratification: moderate  Referring Physician: EFFRON   Exercise Assessment:     Angina with exercise?: No   ST Depression with Exercise?: No  Fall Risk: No   Assistive Devices:  independent  There were no limitations noted by the patient.  Comments on Progression: Nai is progressing well and her workloads will continue to be increased as she tolerates exercise intensity.     Exercise Plan:   Goals:  CR Exercise Goals: Attend Cardiac Rehab 3 times/week: In Progress  Home Aerobic Exercise: 2 additional days/week for 30-60 minutes: In Progress  Intensity of 12-15 on the Rate of Perceived Exertion (RPE) scale: In Progress  30% increase in entry estimated METS: 16.9 : In Progress  5 days/week for 30-60 minutes: In Progress    Comments on Goal Progression:  Patient Consistency: consistent with attendance  Home exercise? Yes: Walking; Frequency: 1 non-rehab day per week; Duration approximately 60 minutes  Patient reports intensity rate 11-15 on RPE scale    Intervention/Recommendations:   Discussed importance of regular attendance to cardiac rehab class    Exercise Prescription:  THR Range    Mode: Nustep  Elliptical   Frequency:  3 days/week   Duration:  30-60 minutes   Intensity:  12-15 RPE   Resistance Training:  Yes: 3 lb weights with 10-15 reps based on strength and range of motion and adjusted accordingly     Home Prescription:  Mode Aerobic exercise   Frequency: 2- 3 days/week   Duration: 30-60 minutes   Resistance Training: None     Education:  Body Composition; verbalizes understanding; Date: 23  Diabetes; verbalizes  understanding; Date: 3-6-23  Exercise and Rehydration; verbalizes understanding; Date: 2-27-23  Flexibility/Stretching; verbalizes understanding; Date: 1-30-23  Relaxation Techniques; verbalizes understanding; Date: 2-13-23    Comments:  I reviewed exercise recommendations with Nai.  I encouraged her to continue exercising but to try adding another day.  I also suggested she decrease walking time in hopes they may help her add the other day.  She stated understanding.      The exercise prescription will continue to be adjusted based on tolerance of exercise intensity by patient.    Rigoberto Ruth., CEP

## 2023-03-07 NOTE — PROGRESS NOTES
12 Session Follow Up   Cardiac Rehab Individual Treatment Plan - Reassessment    Patient Name: Nai Boo MRN: [id number]   : 1960   Age: 62 y.o.   Primary Diagnosis: s/p PTCA?stent, CAD, HLD, HTN,mICM, h/o STEMI    Nutrition Assessment:     Anthropometrics    Height 65 inches   Weight 112.8 lbs   BMI 18.8     Patient confirms she is taking nothing for cholesterol control.  Difficulty Chewing or Swallowing: no  Current Exercise: See Exercise Physiologist Note  Food Safety/Food Preparation:  shares with   Living Arrangements/Family Support: Lives with spouse  Cultural/Spiritual/Personal Preferences: not applicable   Barriers to Education: none identified  Stage of Change Related to Diet Habits: Maintenance  Recent Changes to Diet: Yes - more fruit intake  Food Diary: Given      Nutrition Plan:   Goals:  LDL-C < 70 (for high risk patients)  Hgb A1c < 7%  BMI < 25 and abdominal girth < 40M/<35 F  2 gram sodium, Mediterranean diet: In Progress  Rehab weight goal of maintenance  Fish intake (non-fried varieties) to a goal of 2-3 servings per week: In Progress  Increase fruit and vegetable intake: In Progress    Comments on Goal Progression:  Pt enjoying whole grain bread, already eating fish 2x weekly, working on increasing vegetable intake. Discussed improving hydration    Interventions:  Dietitian Consult: No  Patient to participate in Cardiac Rehab sessions three times a week  Weekly Dietitian Weight Check  Nutrition Recommendations Provided: Verbal, Reviewed  Follow Up Plan for Ongoing Self-Management Support    Education:  Dining Out; verbalizes understanding; Date: 2/15/23  Mediterranean Diet; verbalizes understanding; Date: 3/1/23  Recipe Modication; verbalizes understanding; Date: 22  Vitamins; verbalizes understanding; Date: 23    Comments:   Discussed ways to incorporate healthy snacks, eating on a schedule, and monitoring sodium intake for heart health.    Diabetes  Is the  patient diabetic? No      Allison Barcenas MS, RDN/LDN

## 2023-03-08 ENCOUNTER — CLINICAL SUPPORT (OUTPATIENT)
Dept: CARDIAC REHAB | Facility: CLINIC | Age: 63
End: 2023-03-08
Payer: COMMERCIAL

## 2023-03-08 DIAGNOSIS — I25.10 ATHEROSCLEROSIS OF NATIVE CORONARY ARTERY OF NATIVE HEART WITHOUT ANGINA PECTORIS: ICD-10-CM

## 2023-03-08 DIAGNOSIS — Z98.61 POSTSURGICAL PERCUTANEOUS TRANSLUMINAL CORONARY ANGIOPLASTY STATUS: Primary | ICD-10-CM

## 2023-03-08 PROCEDURE — 93798 PR CARDIAC REHAB/MONITOR: ICD-10-PCS | Mod: S$GLB,,, | Performed by: INTERNAL MEDICINE

## 2023-03-08 PROCEDURE — 93798 PHYS/QHP OP CAR RHAB W/ECG: CPT | Mod: S$GLB,,, | Performed by: INTERNAL MEDICINE

## 2023-03-09 NOTE — PROGRESS NOTES
Session: 12 Session Follow Up   Cardiac Rehab Individual Treatment Plan - Reassessment      Patient Name: Nai Boo MRN: [id number]   : 1960   Age: 62 y.o.   Primary Diagnosis: PTCA      Psychosocial Assessment:   Living Arrangements: Lives with spouse  Family Support: family and spouse  Self Reported: increase Effective Coping Skills and Depression  Displays: calmness  Medication: not applicable    Psychosocial Plan:   Goals:  Improved psychosocial coping strategies: In Progress  Verbalizes coping mechanisms: In Progress  Reduce manifestation of anxiety: In Progress  Maintain positive support system: In Progress  Maintain positive outlook: In Progress  Improve overall quality of life: In Progress    Comments on Goal Progression:  Patient is working on achieving goals to decrease stress at work.    Interventions:  Patient to Self Report Emotional Changes at Session Check In  Recommend Physical Activity  Recommend Attending Education Lectures  Notify MD: No  Program Referral: Declined  Pharmaceutical Intervention/Therapy: Declined  Other Needs: not applicable  Stage of Readiness to Change: Action    Education:  Stress Management; verbalizes understanding; Date: 3/8/2023  Depression; verbalizes understanding; Date: 3/8/2023  Stress; verbalizes understanding; Date: 3/8/2023    Comments:  Patient is currently experiencing stress/depression due to her mother recently passing away just this week.  She reports that she is sad over her death, but states her 94 year old mother had dementia & has been in a nursing home for an extended period of time. She has been instructed to notify staff in the event that circumstances worsen.  Patient verbalizes understanding.  Offered referral to psychiatry/PCP for assistance, but she declines.  Reports to have stress in the workplace, but does have good support from her .    Other Core Components/Risk Factors Assessment:   RISK FACTORS:  hyperlipidemia, positive  family history, sedentary lifestyle, stress    Learning Barriers: None    Medication Compliance: has been compliant with the medicaiton regimen    Other Core Components/Risk Factors Plan:   Goals:  Decrease cholesterol level: In Progress  Increase exercise tolerance: In Progress  Increase knowledge of CAD: In Progress  Learn more about healthy eating: In Progress    Comments on Goal Progression:  Patient is currently working on achieving goals to decrease risk factors for CAD.    Interventions:  Individual Education/ Counseling: Yes  Physician Referral: No    Education:    blood pressure meds, verbalizes understanding; Date: 2/17/2023  cholesterol meds, verbalizes understanding; Date: 2/24/2023  risk factors, verbalizes understanding; Date: 3/8/2023  stress, verbalizes understanding; Date: 3/8/2023         Education method adapted to patients education level and preferred method of learning.  Method: explanation  handouts    Comments:  Encouraged patient to continue to work on decreasing risk factors.      Other Core Components/Hypertension Assessment:   BP Range: 120-80/80-58  BP at Goal: Yes  Patient reported symptoms: none    Other Core Components/Hypertension Plan:   Goals:  Blood Pressure <130/80    Comments on Goal Progression:  Patient is working to keep BP at goal.  She has had some isolated lower readings.  She denies any lightheadedness or dizziness.  Encouraged for patient to hydrate properly.    Interventions:  Med Card Reconciled: Yes  Encourage medication compliance  Encourage sodium reduction  Recommend physical activity  Educate on contributory factors  Reduce stress, anxiety, anger, depression, and/or chronic pain  Encourage home blood pressure monitoring    Education:    Risk Factors; verbalizes understanding; Date: 3/8/2023  Medication I; verbalizes understanding; Date: 2/17/2023  Medication II; verbalizes understanding; Date: 2/24/2023  Stress; verbalizes understanding; Date: 3/8/2023          Comments:  Patient is monitoring BP occasionally at home & reports similar readings obtained in rehab.  Instructed her to be sure to reports any lightheadedness or dizziness.    Does the patient have Heart Failure? No      Other Core Components/Tobacco Cessation Assessment:   Smoking Status: lifetime non-smoker  Smoking Cessation Barriers:  N/A  Stage of Readiness to Change: Maintenance    Other Core Components/Tobacco Cessation Plan:   Goals:  Maintain non-smoking status    Comments on Goal Progression:  Non smoker.    Interventions:  Maintains non-smoking status    Education:    Risk Factors; verbalizes understanding; Date: 3/8/2023         Comments:  Non smoker.    Monse Kearney, FARIDA  Cardiac Rehab Nurse

## 2023-03-13 ENCOUNTER — CLINICAL SUPPORT (OUTPATIENT)
Dept: CARDIAC REHAB | Facility: CLINIC | Age: 63
End: 2023-03-13
Payer: COMMERCIAL

## 2023-03-13 DIAGNOSIS — I25.10 CORONARY ARTERY DISEASE, UNSPECIFIED VESSEL OR LESION TYPE, UNSPECIFIED WHETHER ANGINA PRESENT, UNSPECIFIED WHETHER NATIVE OR TRANSPLANTED HEART: ICD-10-CM

## 2023-03-13 DIAGNOSIS — Z98.61 CORONARY ANGIOPLASTY STATUS: Primary | ICD-10-CM

## 2023-03-13 PROCEDURE — 93798 PHYS/QHP OP CAR RHAB W/ECG: CPT | Mod: S$GLB,,, | Performed by: INTERNAL MEDICINE

## 2023-03-13 PROCEDURE — 93798 PR CARDIAC REHAB/MONITOR: ICD-10-PCS | Mod: S$GLB,,, | Performed by: INTERNAL MEDICINE

## 2023-03-15 ENCOUNTER — CLINICAL SUPPORT (OUTPATIENT)
Dept: CARDIAC REHAB | Facility: CLINIC | Age: 63
End: 2023-03-15
Payer: COMMERCIAL

## 2023-03-15 DIAGNOSIS — Z98.61 POSTSURGICAL PERCUTANEOUS TRANSLUMINAL CORONARY ANGIOPLASTY STATUS: Primary | ICD-10-CM

## 2023-03-15 DIAGNOSIS — I25.10 ATHEROSCLEROSIS OF NATIVE CORONARY ARTERY OF NATIVE HEART WITHOUT ANGINA PECTORIS: ICD-10-CM

## 2023-03-15 PROCEDURE — 93798 PHYS/QHP OP CAR RHAB W/ECG: CPT | Mod: S$GLB,,, | Performed by: INTERNAL MEDICINE

## 2023-03-15 PROCEDURE — 93798 PR CARDIAC REHAB/MONITOR: ICD-10-PCS | Mod: S$GLB,,, | Performed by: INTERNAL MEDICINE

## 2023-03-16 ENCOUNTER — DOCUMENTATION ONLY (OUTPATIENT)
Dept: CARDIAC REHAB | Facility: CLINIC | Age: 63
End: 2023-03-16
Payer: COMMERCIAL

## 2023-03-16 NOTE — PROGRESS NOTES
Nai has completed 15 out of 36 exercise session of Phase II cardiac rehab.  A follow up reassessment will be completed at 24 sessions.     12 Session Follow Up   Cardiac Rehab Individual Treatment Plan - Reassessment      Patient Name: Nai Boo MRN: [id number]   : 1960   Age: 62 y.o.   Primary Diagnosis: PTCA/STENT Date of Event: 22   EF: 35-40%  Risk Stratification: moderate  Referring Physician: EFFRON   Exercise Assessment:     Angina with exercise?: No   ST Depression with Exercise?: No  Fall Risk: No   Assistive Devices:  independent  There were no limitations noted by the patient.  Comments on Progression: Nai is progressing well and her workloads will continue to be increased as she tolerates exercise intensity.     Exercise Plan:   Goals:  CR Exercise Goals: Attend Cardiac Rehab 3 times/week: In Progress  Home Aerobic Exercise: 2 additional days/week for 30-60 minutes: In Progress  Intensity of 12-15 on the Rate of Perceived Exertion (RPE) scale: In Progress  30% increase in entry estimated METS: 16.9 : In Progress  5 days/week for 30-60 minutes: In Progress    Comments on Goal Progression:  Patient Consistency: consistent with attendance  Home exercise? Yes: Walking; Frequency: 1 non-rehab day per week; Duration approximately 60 minutes  Patient reports intensity rate 11-15 on RPE scale    Intervention/Recommendations:   Discussed importance of regular attendance to cardiac rehab class    Exercise Prescription:  THR Range    Mode: Nustep  Elliptical   Frequency:  3 days/week   Duration:  30-60 minutes   Intensity:  12-15 RPE   Resistance Training:  Yes: 3 lb weights with 10-15 reps based on strength and range of motion and adjusted accordingly     Home Prescription:  Mode Aerobic exercise   Frequency: 2- 3 days/week   Duration: 30-60 minutes   Resistance Training: None     Education:  Body Composition; verbalizes understanding; Date: 23  Diabetes; verbalizes  understanding; Date: 3-6-23  Exercise and Rehydration; verbalizes understanding; Date: 23  Flexibility/Stretching; verbalizes understanding; Date: 23  Relaxation Techniques; verbalizes understanding; Date: 23    Comments:  I reviewed exercise recommendations with Nai.  I encouraged her to continue exercising but to try adding another day.  I also suggested she decrease walking time in hopes they may help her add the other day.  She stated understanding.      The exercise prescription will continue to be adjusted based on tolerance of exercise intensity by patient.    Johnathon Ruth, CEP     12 Session Follow Up   Cardiac Rehab Individual Treatment Plan - Reassessment    Patient Name: Nai Boo MRN: [id number]   : 1960   Age: 62 y.o.   Primary Diagnosis: s/p PTCA?stent, CAD, HLD, HTN,mICM, h/o STEMI    Nutrition Assessment:     Anthropometrics    Height 65 inches   Weight 112.8 lbs   BMI 18.8     Patient confirms she is taking nothing for cholesterol control.  Difficulty Chewing or Swallowing: no  Current Exercise: See Exercise Physiologist Note  Food Safety/Food Preparation:  shares with   Living Arrangements/Family Support: Lives with spouse  Cultural/Spiritual/Personal Preferences: not applicable   Barriers to Education: none identified  Stage of Change Related to Diet Habits: Maintenance  Recent Changes to Diet: Yes - more fruit intake  Food Diary: Given      Nutrition Plan:   Goals:  LDL-C < 70 (for high risk patients)  Hgb A1c < 7%  BMI < 25 and abdominal girth < 40M/<35 F  2 gram sodium, Mediterranean diet: In Progress  Rehab weight goal of maintenance  Fish intake (non-fried varieties) to a goal of 2-3 servings per week: In Progress  Increase fruit and vegetable intake: In Progress    Comments on Goal Progression:  Pt enjoying whole grain bread, already eating fish 2x weekly, working on increasing vegetable intake. Discussed improving  hydration    Interventions:  Dietitian Consult: No  Patient to participate in Cardiac Rehab sessions three times a week  Weekly Dietitian Weight Check  Nutrition Recommendations Provided: Verbal, Reviewed  Follow Up Plan for Ongoing Self-Management Support    Education:  Dining Out; verbalizes understanding; Date: 2/15/23  Mediterranean Diet; verbalizes understanding; Date: 3/1/23  Recipe Modication; verbalizes understanding; Date: 22  Vitamins; verbalizes understanding; Date: 23    Comments:   Discussed ways to incorporate healthy snacks, eating on a schedule, and monitoring sodium intake for heart health.    Diabetes  Is the patient diabetic? No      Allison Barcenas MS, RDN/LDN       Session: 12 Session Follow Up   Cardiac Rehab Individual Treatment Plan - Reassessment      Patient Name: Nai Boo MRN: [id number]   : 1960   Age: 62 y.o.   Primary Diagnosis: PTCA      Psychosocial Assessment:   Living Arrangements: Lives with spouse  Family Support: family and spouse  Self Reported: increase Effective Coping Skills and Depression  Displays: calmness  Medication: not applicable    Psychosocial Plan:   Goals:  Improved psychosocial coping strategies: In Progress  Verbalizes coping mechanisms: In Progress  Reduce manifestation of anxiety: In Progress  Maintain positive support system: In Progress  Maintain positive outlook: In Progress  Improve overall quality of life: In Progress    Comments on Goal Progression:  Patient is working on achieving goals to decrease stress at work.    Interventions:  Patient to Self Report Emotional Changes at Session Check In  Recommend Physical Activity  Recommend Attending Education Lectures  Notify MD: No  Program Referral: Declined  Pharmaceutical Intervention/Therapy: Declined  Other Needs: not applicable  Stage of Readiness to Change: Action    Education:  Stress Management; verbalizes understanding; Date: 3/8/2023  Depression; verbalizes understanding;  Date: 3/8/2023  Stress; verbalizes understanding; Date: 3/8/2023    Comments:  Patient is currently experiencing stress/depression due to her mother recently passing away just this week.  She reports that she is sad over her death, but states her 94 year old mother had dementia & has been in a nursing home for an extended period of time. She has been instructed to notify staff in the event that circumstances worsen.  Patient verbalizes understanding.  Offered referral to psychiatry/PCP for assistance, but she declines.  Reports to have stress in the workplace, but does have good support from her .    Other Core Components/Risk Factors Assessment:   RISK FACTORS:  hyperlipidemia, positive family history, sedentary lifestyle, stress    Learning Barriers: None    Medication Compliance: has been compliant with the medicaiton regimen    Other Core Components/Risk Factors Plan:   Goals:  Decrease cholesterol level: In Progress  Increase exercise tolerance: In Progress  Increase knowledge of CAD: In Progress  Learn more about healthy eating: In Progress    Comments on Goal Progression:  Patient is currently working on achieving goals to decrease risk factors for CAD.    Interventions:  Individual Education/ Counseling: Yes  Physician Referral: No    Education:    blood pressure meds, verbalizes understanding; Date: 2/17/2023  cholesterol meds, verbalizes understanding; Date: 2/24/2023  risk factors, verbalizes understanding; Date: 3/8/2023  stress, verbalizes understanding; Date: 3/8/2023         Education method adapted to patients education level and preferred method of learning.  Method: explanation  handouts    Comments:  Encouraged patient to continue to work on decreasing risk factors.      Other Core Components/Hypertension Assessment:   BP Range: 120-80/80-58  BP at Goal: Yes  Patient reported symptoms: none    Other Core Components/Hypertension Plan:   Goals:  Blood Pressure <130/80    Comments on Goal  Progression:  Patient is working to keep BP at goal.  She has had some isolated lower readings.  She denies any lightheadedness or dizziness.  Encouraged for patient to hydrate properly.    Interventions:  Med Card Reconciled: Yes  Encourage medication compliance  Encourage sodium reduction  Recommend physical activity  Educate on contributory factors  Reduce stress, anxiety, anger, depression, and/or chronic pain  Encourage home blood pressure monitoring    Education:    Risk Factors; verbalizes understanding; Date: 3/8/2023  Medication I; verbalizes understanding; Date: 2/17/2023  Medication II; verbalizes understanding; Date: 2/24/2023  Stress; verbalizes understanding; Date: 3/8/2023         Comments:  Patient is monitoring BP occasionally at home & reports similar readings obtained in rehab.  Instructed her to be sure to reports any lightheadedness or dizziness.    Does the patient have Heart Failure? No      Other Core Components/Tobacco Cessation Assessment:   Smoking Status: lifetime non-smoker  Smoking Cessation Barriers:  N/A  Stage of Readiness to Change: Maintenance    Other Core Components/Tobacco Cessation Plan:   Goals:  Maintain non-smoking status    Comments on Goal Progression:  Non smoker.    Interventions:  Maintains non-smoking status    Education:    Risk Factors; verbalizes understanding; Date: 3/8/2023         Comments:  Non smoker.    Monse Kearney RN  Cardiac Rehab Nurse

## 2023-03-17 ENCOUNTER — CLINICAL SUPPORT (OUTPATIENT)
Dept: CARDIAC REHAB | Facility: CLINIC | Age: 63
End: 2023-03-17
Payer: COMMERCIAL

## 2023-03-17 DIAGNOSIS — Z98.61 POSTSURGICAL PERCUTANEOUS TRANSLUMINAL CORONARY ANGIOPLASTY STATUS: Primary | ICD-10-CM

## 2023-03-17 DIAGNOSIS — I25.10 CORONARY ATHEROSCLEROSIS OF NATIVE CORONARY ARTERY: ICD-10-CM

## 2023-03-17 PROCEDURE — 93798 PHYS/QHP OP CAR RHAB W/ECG: CPT | Mod: S$GLB,,, | Performed by: INTERNAL MEDICINE

## 2023-03-17 PROCEDURE — 93798 PR CARDIAC REHAB/MONITOR: ICD-10-PCS | Mod: S$GLB,,, | Performed by: INTERNAL MEDICINE

## 2023-03-20 ENCOUNTER — CLINICAL SUPPORT (OUTPATIENT)
Dept: CARDIAC REHAB | Facility: CLINIC | Age: 63
End: 2023-03-20
Payer: COMMERCIAL

## 2023-03-20 DIAGNOSIS — Z98.61 POSTSURGICAL PERCUTANEOUS TRANSLUMINAL CORONARY ANGIOPLASTY STATUS: Primary | ICD-10-CM

## 2023-03-20 DIAGNOSIS — I25.10 ATHEROSCLEROSIS OF NATIVE CORONARY ARTERY OF NATIVE HEART WITHOUT ANGINA PECTORIS: ICD-10-CM

## 2023-03-20 PROCEDURE — 93798 PHYS/QHP OP CAR RHAB W/ECG: CPT | Mod: S$GLB,,, | Performed by: INTERNAL MEDICINE

## 2023-03-20 PROCEDURE — 93798 PR CARDIAC REHAB/MONITOR: ICD-10-PCS | Mod: S$GLB,,, | Performed by: INTERNAL MEDICINE

## 2023-03-22 ENCOUNTER — CLINICAL SUPPORT (OUTPATIENT)
Dept: CARDIAC REHAB | Facility: CLINIC | Age: 63
End: 2023-03-22
Payer: COMMERCIAL

## 2023-03-22 DIAGNOSIS — I25.10 CORONARY ATHEROSCLEROSIS OF NATIVE CORONARY ARTERY: ICD-10-CM

## 2023-03-22 DIAGNOSIS — Z98.61 POSTSURGICAL PERCUTANEOUS TRANSLUMINAL CORONARY ANGIOPLASTY STATUS: Primary | ICD-10-CM

## 2023-03-22 PROCEDURE — 93798 PR CARDIAC REHAB/MONITOR: ICD-10-PCS | Mod: S$GLB,,, | Performed by: INTERNAL MEDICINE

## 2023-03-22 PROCEDURE — 93798 PHYS/QHP OP CAR RHAB W/ECG: CPT | Mod: S$GLB,,, | Performed by: INTERNAL MEDICINE

## 2023-03-24 ENCOUNTER — CLINICAL SUPPORT (OUTPATIENT)
Dept: CARDIAC REHAB | Facility: CLINIC | Age: 63
End: 2023-03-24
Payer: COMMERCIAL

## 2023-03-24 DIAGNOSIS — Z98.61 POSTSURGICAL PERCUTANEOUS TRANSLUMINAL CORONARY ANGIOPLASTY STATUS: Primary | ICD-10-CM

## 2023-03-24 DIAGNOSIS — I25.10 CORONARY ATHEROSCLEROSIS OF NATIVE CORONARY ARTERY: ICD-10-CM

## 2023-03-24 PROCEDURE — 93798 PHYS/QHP OP CAR RHAB W/ECG: CPT | Mod: S$GLB,,, | Performed by: INTERNAL MEDICINE

## 2023-03-24 PROCEDURE — 93798 PR CARDIAC REHAB/MONITOR: ICD-10-PCS | Mod: S$GLB,,, | Performed by: INTERNAL MEDICINE

## 2023-03-27 ENCOUNTER — CLINICAL SUPPORT (OUTPATIENT)
Dept: CARDIAC REHAB | Facility: CLINIC | Age: 63
End: 2023-03-27
Payer: COMMERCIAL

## 2023-03-27 ENCOUNTER — HOSPITAL ENCOUNTER (OUTPATIENT)
Dept: CARDIOLOGY | Facility: HOSPITAL | Age: 63
Discharge: HOME | End: 2023-03-27
Attending: INTERNAL MEDICINE
Payer: COMMERCIAL

## 2023-03-27 VITALS
HEART RATE: 75 BPM | WEIGHT: 111 LBS | SYSTOLIC BLOOD PRESSURE: 104 MMHG | BODY MASS INDEX: 18.49 KG/M2 | HEIGHT: 65 IN | DIASTOLIC BLOOD PRESSURE: 60 MMHG

## 2023-03-27 DIAGNOSIS — I25.10 CORONARY ARTERY DISEASE, UNSPECIFIED VESSEL OR LESION TYPE, UNSPECIFIED WHETHER ANGINA PRESENT, UNSPECIFIED WHETHER NATIVE OR TRANSPLANTED HEART: ICD-10-CM

## 2023-03-27 DIAGNOSIS — I25.2 HISTORY OF ST ELEVATION MYOCARDIAL INFARCTION (STEMI): ICD-10-CM

## 2023-03-27 DIAGNOSIS — Z98.61 POSTSURGICAL PERCUTANEOUS TRANSLUMINAL CORONARY ANGIOPLASTY STATUS: Primary | ICD-10-CM

## 2023-03-27 LAB
ASCENDING AORTA: 2.54 CM
AV INDEX (PROSTH): 0.76
AV MEAN GRADIENT: 2 MMHG
AV PEAK GRADIENT: 5 MMHG
AV VALVE AREA: 2.22 CM2
AV VELOCITY RATIO: 0.66
BSA FOR ECHO PROCEDURE: 1.52 M2
CV ECHO LV RWT: 0.43 CM
DOP CALC AO PEAK VEL: 1.07 M/S
DOP CALC AO VTI: 22.41 CM
DOP CALC LVOT AREA: 2.9 CM2
DOP CALC LVOT DIAMETER: 1.93 CM
DOP CALC LVOT PEAK VEL: 0.71 M/S
DOP CALC LVOT STROKE VOLUME: 49.83 CM3
DOP CALCLVOT PEAK VEL VTI: 17.04 CM
E WAVE DECELERATION TIME: 131.77 MSEC
E/A RATIO: 1.45
E/E' RATIO: 7.57 M/S
ECHO LV POSTERIOR WALL: 0.78 CM (ref 0.6–1.1)
EJECTION FRACTION: 60 %
FRACTIONAL SHORTENING: 29 % (ref 28–44)
INTERVENTRICULAR SEPTUM: 0.8 CM (ref 0.6–1.1)
IVRT: 97.05 MSEC
LA MAJOR: 3.76 CM
LA MINOR: 4.81 CM
LA WIDTH: 3.4 CM
LEFT ATRIUM SIZE: 3.04 CM
LEFT ATRIUM VOLUME INDEX MOD: 17.4 ML/M2
LEFT ATRIUM VOLUME INDEX: 24.1 ML/M2
LEFT ATRIUM VOLUME MOD: 26.78 CM3
LEFT ATRIUM VOLUME: 37.08 CM3
LEFT INTERNAL DIMENSION IN SYSTOLE: 2.6 CM (ref 2.1–4)
LEFT VENTRICLE DIASTOLIC VOLUME INDEX: 36.66 ML/M2
LEFT VENTRICLE DIASTOLIC VOLUME: 56.45 ML
LEFT VENTRICLE MASS INDEX: 52 G/M2
LEFT VENTRICLE SYSTOLIC VOLUME INDEX: 15.9 ML/M2
LEFT VENTRICLE SYSTOLIC VOLUME: 24.56 ML
LEFT VENTRICULAR INTERNAL DIMENSION IN DIASTOLE: 3.66 CM (ref 3.5–6)
LEFT VENTRICULAR MASS: 79.52 G
LV LATERAL E/E' RATIO: 6.69 M/S
LV SEPTAL E/E' RATIO: 8.7 M/S
MV A" WAVE DURATION": 12.27 MSEC
MV PEAK A VEL: 0.6 M/S
MV PEAK E VEL: 0.87 M/S
MV STENOSIS PRESSURE HALF TIME: 38.21 MS
MV VALVE AREA P 1/2 METHOD: 5.76 CM2
PISA TR MAX VEL: 2.07 M/S
PULM VEIN S/D RATIO: 1.26
PV PEAK D VEL: 0.62 M/S
PV PEAK S VEL: 0.78 M/S
RA MAJOR: 3.98 CM
RA PRESSURE: 3 MMHG
RA WIDTH: 3.09 CM
RIGHT VENTRICULAR END-DIASTOLIC DIMENSION: 3.07 CM
RV TISSUE DOPPLER FREE WALL SYSTOLIC VELOCITY 1 (APICAL 4 CHAMBER VIEW): 10.44 CM/S
SINUS: 3.18 CM
STJ: 2.77 CM
TDI LATERAL: 0.13 M/S
TDI SEPTAL: 0.1 M/S
TDI: 0.12 M/S
TR MAX PG: 17 MMHG
TRICUSPID ANNULAR PLANE SYSTOLIC EXCURSION: 2.14 CM
TV REST PULMONARY ARTERY PRESSURE: 20 MMHG

## 2023-03-27 PROCEDURE — 93798 PHYS/QHP OP CAR RHAB W/ECG: CPT | Mod: S$GLB,,, | Performed by: INTERNAL MEDICINE

## 2023-03-27 PROCEDURE — 93306 TTE W/DOPPLER COMPLETE: CPT | Mod: 26,,, | Performed by: INTERNAL MEDICINE

## 2023-03-27 PROCEDURE — 93306 ECHO (CUPID ONLY): ICD-10-PCS | Mod: 26,,, | Performed by: INTERNAL MEDICINE

## 2023-03-27 PROCEDURE — 93798 PR CARDIAC REHAB/MONITOR: ICD-10-PCS | Mod: S$GLB,,, | Performed by: INTERNAL MEDICINE

## 2023-03-27 PROCEDURE — 93306 TTE W/DOPPLER COMPLETE: CPT

## 2023-03-29 ENCOUNTER — CLINICAL SUPPORT (OUTPATIENT)
Dept: CARDIAC REHAB | Facility: CLINIC | Age: 63
End: 2023-03-29
Payer: COMMERCIAL

## 2023-03-29 DIAGNOSIS — Z98.61 POSTSURGICAL PERCUTANEOUS TRANSLUMINAL CORONARY ANGIOPLASTY STATUS: Primary | ICD-10-CM

## 2023-03-29 DIAGNOSIS — I25.10 ATHEROSCLEROSIS OF NATIVE CORONARY ARTERY OF NATIVE HEART, UNSPECIFIED WHETHER ANGINA PRESENT: ICD-10-CM

## 2023-03-29 PROCEDURE — 93798 PHYS/QHP OP CAR RHAB W/ECG: CPT | Mod: S$GLB,,, | Performed by: INTERNAL MEDICINE

## 2023-03-29 PROCEDURE — 93798 PR CARDIAC REHAB/MONITOR: ICD-10-PCS | Mod: S$GLB,,, | Performed by: INTERNAL MEDICINE

## 2023-03-31 ENCOUNTER — CLINICAL SUPPORT (OUTPATIENT)
Dept: CARDIAC REHAB | Facility: CLINIC | Age: 63
End: 2023-03-31
Payer: COMMERCIAL

## 2023-03-31 DIAGNOSIS — Z98.61 POSTSURGICAL PERCUTANEOUS TRANSLUMINAL CORONARY ANGIOPLASTY STATUS: Primary | ICD-10-CM

## 2023-03-31 DIAGNOSIS — I25.10 ATHEROSCLEROSIS OF NATIVE CORONARY ARTERY OF NATIVE HEART WITHOUT ANGINA PECTORIS: ICD-10-CM

## 2023-04-03 ENCOUNTER — CLINICAL SUPPORT (OUTPATIENT)
Dept: CARDIAC REHAB | Facility: CLINIC | Age: 63
End: 2023-04-03
Payer: COMMERCIAL

## 2023-04-03 DIAGNOSIS — I25.10 ATHEROSCLEROSIS OF NATIVE CORONARY ARTERY OF NATIVE HEART, UNSPECIFIED WHETHER ANGINA PRESENT: ICD-10-CM

## 2023-04-03 DIAGNOSIS — Z98.61 POSTSURGICAL PERCUTANEOUS TRANSLUMINAL CORONARY ANGIOPLASTY STATUS: Primary | ICD-10-CM

## 2023-04-03 PROCEDURE — 93798 PR CARDIAC REHAB/MONITOR: ICD-10-PCS | Mod: S$GLB,,, | Performed by: INTERNAL MEDICINE

## 2023-04-03 PROCEDURE — 93798 PHYS/QHP OP CAR RHAB W/ECG: CPT | Mod: S$GLB,,, | Performed by: INTERNAL MEDICINE

## 2023-04-05 ENCOUNTER — DOCUMENTATION ONLY (OUTPATIENT)
Dept: CARDIAC REHAB | Facility: CLINIC | Age: 63
End: 2023-04-05

## 2023-04-05 ENCOUNTER — CLINICAL SUPPORT (OUTPATIENT)
Dept: CARDIAC REHAB | Facility: CLINIC | Age: 63
End: 2023-04-05
Payer: COMMERCIAL

## 2023-04-05 DIAGNOSIS — Z98.61 POSTSURGICAL PERCUTANEOUS TRANSLUMINAL CORONARY ANGIOPLASTY STATUS: Primary | ICD-10-CM

## 2023-04-05 DIAGNOSIS — I25.10 ATHEROSCLEROSIS OF NATIVE CORONARY ARTERY OF NATIVE HEART, UNSPECIFIED WHETHER ANGINA PRESENT: ICD-10-CM

## 2023-04-05 DIAGNOSIS — I25.10 CORONARY ATHEROSCLEROSIS OF NATIVE CORONARY ARTERY: ICD-10-CM

## 2023-04-05 PROCEDURE — 93798 PR CARDIAC REHAB/MONITOR: ICD-10-PCS | Mod: S$GLB,,, | Performed by: INTERNAL MEDICINE

## 2023-04-05 PROCEDURE — 93798 PHYS/QHP OP CAR RHAB W/ECG: CPT | Mod: S$GLB,,, | Performed by: INTERNAL MEDICINE

## 2023-04-05 NOTE — PROGRESS NOTES
Nai has completed 24 out of 36 exercise session of Phase II cardiac rehab.  A follow up reassessment will be completed at exit interview.     24 Session Follow Up   Cardiac Rehab Individual Treatment Plan - Reassessment      Patient Name: Nai Boo MRN: [id number]   : 1960   Age: 62 y.o.   Primary Diagnosis: PTCA/STENT Date of Event: 22   EF: 35-40%  Risk Stratification: moderate  Referring Physician: EFFRON   Exercise Assessment:     Angina with exercise?: No   ST Depression with Exercise?: No  Fall Risk: No   Assistive Devices:  independent  There were no limitations noted by the patient.  Comments on Progression: Nai is progressing well and her workloads will continue to be increased as she tolerates exercise intensity.     Exercise Plan:   Goals:  CR Exercise Goals: Attend Cardiac Rehab 3 times/week: In Progress  Home Aerobic Exercise: 2 additional days/week for 30-60 minutes: In Progress  Intensity of 12-15 on the Rate of Perceived Exertion (RPE) scale: In Progress  30% increase in entry estimated METS: 16.9 : In Progress  5 days/week for 30-60 minutes: In Progress    Comments on Goal Progression:  Patient Consistency: consistent with attendance  Home exercise? Yes: Walking; Frequency: 2 non-rehab days per week; Duration 45 minutes  Patient reports intensity rate 11-14 on RPE scale    Intervention/Recommendations:   Discussed importance of regular attendance to cardiac rehab class    Exercise Prescription:  THR Range    Mode: Nustep  Elliptical   Frequency:  3 days/week   Duration:  30-60 minutes   Intensity:  12-15 RPE   Resistance Training:  Yes: 5 lb weights with 10-15 reps based on strength and range of motion and adjusted accordingly     Home Prescription:  Mode Aerobic exercise   Frequency: 2- 3 days/week   Duration: 30-60 minutes   Resistance Training: None     Education:  Arthritis/Osteporosis; verbalizes understanding; Date: 3-13-23  Exercise Terminology; verbalizes  understanding; Date: 3-20-23  Muscular Anatomy; verbalizes understanding; Date: 3-27-23  The Exercise Program; verbalizes understanding; Date: 4-3-23    Comments:  I reviewed exercise recommendations with Nai.  I encouraged her to continue exercising.  She stated understanding.      The exercise prescription will continue to be adjusted based on tolerance of exercise intensity by patient.    Trish Jarrell, Rigoberto., CEP

## 2023-04-05 NOTE — PROGRESS NOTES
12 Session Follow Up   Cardiac Rehab Individual Treatment Plan - Reassessment    Patient Name: Nai Boo MRN: [id number]   : 1960   Age: 62 y.o.   Primary Diagnosis: s/p PTCA/stent, CAD, HLD, HTN, ICM, h/o STEMI    Nutrition Assessment:     Anthropometrics    Height 65 inches   Weight 112.2 lbs   BMI 18.6     Patient confirms she is taking nothing for cholesterol control.  Difficulty Chewing or Swallowing: no  Current Exercise: See Exercise Physiologist Note  Food Safety/Food Preparation:  shares with   Living Arrangements/Family Support: Lives with spouse  Cultural/Spiritual/Personal Preferences: not applicable   Barriers to Education: none identified  Stage of Change Related to Diet Habits: Action  Recent Changes to Diet: No  Food Diary: Completed      Nutrition Plan:   Goals:  LDL-C < 70 (for high risk patients)  Hgb A1c < 7%  BMI < 25 and abdominal girth < 40M/<35 F  2 gram sodium, Mediterranean diet: In Progress  Rehab weight goal of maintenance  Fish intake (non-fried varieties) to a goal of 2-3 servings per week: In Progress  Increase fruit and vegetable intake: In Progress    Comments on Goal Progression:  Pt is still struggling to incorporate vegetables into diet as well as staying hydrated at work, discussed several apps she may find helpful to track progress    Interventions:  Dietitian Consult: No  Patient to participate in Cardiac Rehab sessions three times a week  Weekly Dietitian Weight Check  Nutrition Recommendations Provided: Verbal and Written, Reviewed  Follow Up Plan for Ongoing Self-Management Support    Education:  Food Labels; verbalizes understanding; Date: 3/15/23  Seafood; verbalizes understanding; Date: 23  Vegetarianism; verbalizes understanding; Date: 3/8/23  Weight Management; verbalizes understanding; Date: 3/29/23  Food Additives; verbalizes understanding; Date: 3/22/23    Comments:   Discussed ways to incorporate healthy snacks, eating on a schedule, and  monitoring sodium intake for heart health.    Diabetes  Is the patient diabetic? No      Allison Barcenas MS, RDN/LDN

## 2023-04-06 ENCOUNTER — DOCUMENTATION ONLY (OUTPATIENT)
Dept: CARDIAC REHAB | Facility: CLINIC | Age: 63
End: 2023-04-06
Payer: COMMERCIAL

## 2023-04-06 NOTE — PROGRESS NOTES
Nai has completed 24 out of 36 exercise session of Phase II cardiac rehab.  A follow up reassessment will be completed at exit interview.     24 Session Follow Up   Cardiac Rehab Individual Treatment Plan - Reassessment      Patient Name: Nai Boo MRN: [id number]   : 1960   Age: 62 y.o.   Primary Diagnosis: PTCA/STENT Date of Event: 22   EF: 35-40%  Risk Stratification: moderate  Referring Physician: EFFRON   Exercise Assessment:     Angina with exercise?: No   ST Depression with Exercise?: No  Fall Risk: No   Assistive Devices:  independent  There were no limitations noted by the patient.  Comments on Progression: Nai is progressing well and her workloads will continue to be increased as she tolerates exercise intensity.     Exercise Plan:   Goals:  CR Exercise Goals: Attend Cardiac Rehab 3 times/week: In Progress  Home Aerobic Exercise: 2 additional days/week for 30-60 minutes: In Progress  Intensity of 12-15 on the Rate of Perceived Exertion (RPE) scale: In Progress  30% increase in entry estimated METS: 16.9 : In Progress  5 days/week for 30-60 minutes: In Progress    Comments on Goal Progression:  Patient Consistency: consistent with attendance  Home exercise? Yes: Walking; Frequency: 2 non-rehab days per week; Duration 45 minutes  Patient reports intensity rate 11-14 on RPE scale    Intervention/Recommendations:   Discussed importance of regular attendance to cardiac rehab class    Exercise Prescription:  THR Range    Mode: Nustep  Elliptical   Frequency:  3 days/week   Duration:  30-60 minutes   Intensity:  12-15 RPE   Resistance Training:  Yes: 5 lb weights with 10-15 reps based on strength and range of motion and adjusted accordingly     Home Prescription:  Mode Aerobic exercise   Frequency: 2- 3 days/week   Duration: 30-60 minutes   Resistance Training: None     Education:  Arthritis/Osteporosis; verbalizes understanding; Date: 3-13-23  Exercise Terminology; verbalizes  understanding; Date: 3-20-23  Muscular Anatomy; verbalizes understanding; Date: 3-27-23  The Exercise Program; verbalizes understanding; Date: 4-3-23    Comments:  I reviewed exercise recommendations with Nai.  I encouraged her to continue exercising.  She stated understanding.      The exercise prescription will continue to be adjusted based on tolerance of exercise intensity by patient.    Johnathon Ruth, CEP     12 Session Follow Up   Cardiac Rehab Individual Treatment Plan - Reassessment    Patient Name: Nai Boo MRN: [id number]   : 1960   Age: 62 y.o.   Primary Diagnosis: s/p PTCA/stent, CAD, HLD, HTN, ICM, h/o STEMI    Nutrition Assessment:     Anthropometrics    Height 65 inches   Weight 112.2 lbs   BMI 18.6     Patient confirms she is taking nothing for cholesterol control.  Difficulty Chewing or Swallowing: no  Current Exercise: See Exercise Physiologist Note  Food Safety/Food Preparation:  shares with   Living Arrangements/Family Support: Lives with spouse  Cultural/Spiritual/Personal Preferences: not applicable   Barriers to Education: none identified  Stage of Change Related to Diet Habits: Action  Recent Changes to Diet: No  Food Diary: Completed      Nutrition Plan:   Goals:  LDL-C < 70 (for high risk patients)  Hgb A1c < 7%  BMI < 25 and abdominal girth < 40M/<35 F  2 gram sodium, Mediterranean diet: In Progress  Rehab weight goal of maintenance  Fish intake (non-fried varieties) to a goal of 2-3 servings per week: In Progress  Increase fruit and vegetable intake: In Progress    Comments on Goal Progression:  Pt is still struggling to incorporate vegetables into diet as well as staying hydrated at work, discussed several apps she may find helpful to track progress    Interventions:  Dietitian Consult: No  Patient to participate in Cardiac Rehab sessions three times a week  Weekly Dietitian Weight Check  Nutrition Recommendations Provided: Verbal and Written,  Reviewed  Follow Up Plan for Ongoing Self-Management Support    Education:  Food Labels; verbalizes understanding; Date: 3/15/23  Seafood; verbalizes understanding; Date: 23  Vegetarianism; verbalizes understanding; Date: 3/8/23  Weight Management; verbalizes understanding; Date: 3/29/23  Food Additives; verbalizes understanding; Date: 3/22/23    Comments:   Discussed ways to incorporate healthy snacks, eating on a schedule, and monitoring sodium intake for heart health.    Diabetes  Is the patient diabetic? No      Allison Barcenas MS, RDN/LDN       Session: 12 Session Follow Up   Cardiac Rehab Individual Treatment Plan - Reassessment      Patient Name: Nai Boo MRN: [id number]   : 1960   Age: 62 y.o.   Primary Diagnosis: PTCA      Psychosocial Assessment:   Living Arrangements: Lives with spouse  Family Support: family and spouse  Self Reported: increase Effective Coping Skills and Depression  Displays: calmness  Medication: not applicable    Psychosocial Plan:   Goals:  Improved psychosocial coping strategies: In Progress  Verbalizes coping mechanisms: In Progress  Reduce manifestation of anxiety: In Progress  Maintain positive support system: In Progress  Maintain positive outlook: In Progress  Improve overall quality of life: In Progress    Comments on Goal Progression:  Patient is working on achieving goals to decrease stress at work.    Interventions:  Patient to Self Report Emotional Changes at Session Check In  Recommend Physical Activity  Recommend Attending Education Lectures  Notify MD: No  Program Referral: Declined  Pharmaceutical Intervention/Therapy: Declined  Other Needs: not applicable  Stage of Readiness to Change: Action    Education:  Stress Management; verbalizes understanding; Date: 3/8/2023  Depression; verbalizes understanding; Date: 3/8/2023  Stress; verbalizes understanding; Date: 3/8/2023    Comments:  Patient is currently experiencing stress/depression due to her  mother recently passing away just this week.  She reports that she is sad over her death, but states her 94 year old mother had dementia & has been in a nursing home for an extended period of time. She has been instructed to notify staff in the event that circumstances worsen.  Patient verbalizes understanding.  Offered referral to psychiatry/PCP for assistance, but she declines.  Reports to have stress in the workplace, but does have good support from her .    Other Core Components/Risk Factors Assessment:   RISK FACTORS:  hyperlipidemia, positive family history, sedentary lifestyle, stress    Learning Barriers: None    Medication Compliance: has been compliant with the medicaiton regimen    Other Core Components/Risk Factors Plan:   Goals:  Decrease cholesterol level: In Progress  Increase exercise tolerance: In Progress  Increase knowledge of CAD: In Progress  Learn more about healthy eating: In Progress    Comments on Goal Progression:  Patient is currently working on achieving goals to decrease risk factors for CAD.    Interventions:  Individual Education/ Counseling: Yes  Physician Referral: No    Education:    blood pressure meds, verbalizes understanding; Date: 2/17/2023  cholesterol meds, verbalizes understanding; Date: 2/24/2023  risk factors, verbalizes understanding; Date: 3/8/2023  stress, verbalizes understanding; Date: 3/8/2023         Education method adapted to patients education level and preferred method of learning.  Method: explanation  handouts    Comments:  Encouraged patient to continue to work on decreasing risk factors.      Other Core Components/Hypertension Assessment:   BP Range: 120-80/80-58  BP at Goal: Yes  Patient reported symptoms: none    Other Core Components/Hypertension Plan:   Goals:  Blood Pressure <130/80    Comments on Goal Progression:  Patient is working to keep BP at goal.  She has had some isolated lower readings.  She denies any lightheadedness or dizziness.   Encouraged for patient to hydrate properly.    Interventions:  Med Card Reconciled: Yes  Encourage medication compliance  Encourage sodium reduction  Recommend physical activity  Educate on contributory factors  Reduce stress, anxiety, anger, depression, and/or chronic pain  Encourage home blood pressure monitoring    Education:    Risk Factors; verbalizes understanding; Date: 3/8/2023  Medication I; verbalizes understanding; Date: 2/17/2023  Medication II; verbalizes understanding; Date: 2/24/2023  Stress; verbalizes understanding; Date: 3/8/2023         Comments:  Patient is monitoring BP occasionally at home & reports similar readings obtained in rehab.  Instructed her to be sure to reports any lightheadedness or dizziness.    Does the patient have Heart Failure? No      Other Core Components/Tobacco Cessation Assessment:   Smoking Status: lifetime non-smoker  Smoking Cessation Barriers:  N/A  Stage of Readiness to Change: Maintenance    Other Core Components/Tobacco Cessation Plan:   Goals:  Maintain non-smoking status    Comments on Goal Progression:  Non smoker.    Interventions:  Maintains non-smoking status    Education:    Risk Factors; verbalizes understanding; Date: 3/8/2023         Comments:  Non smoker.    Monse Kearney RN  Cardiac Rehab Nurse

## 2023-04-10 ENCOUNTER — CLINICAL SUPPORT (OUTPATIENT)
Dept: CARDIAC REHAB | Facility: CLINIC | Age: 63
End: 2023-04-10
Payer: COMMERCIAL

## 2023-04-10 DIAGNOSIS — Z98.61 POSTSURGICAL PERCUTANEOUS TRANSLUMINAL CORONARY ANGIOPLASTY STATUS: Primary | ICD-10-CM

## 2023-04-10 DIAGNOSIS — I25.10 CORONARY ARTERY DISEASE, UNSPECIFIED VESSEL OR LESION TYPE, UNSPECIFIED WHETHER ANGINA PRESENT, UNSPECIFIED WHETHER NATIVE OR TRANSPLANTED HEART: ICD-10-CM

## 2023-04-10 PROCEDURE — 93798 PR CARDIAC REHAB/MONITOR: ICD-10-PCS | Mod: S$GLB,,, | Performed by: INTERNAL MEDICINE

## 2023-04-10 PROCEDURE — 93798 PHYS/QHP OP CAR RHAB W/ECG: CPT | Mod: S$GLB,,, | Performed by: INTERNAL MEDICINE

## 2023-04-12 ENCOUNTER — CLINICAL SUPPORT (OUTPATIENT)
Dept: CARDIAC REHAB | Facility: CLINIC | Age: 63
End: 2023-04-12
Payer: COMMERCIAL

## 2023-04-12 DIAGNOSIS — I25.10 CORONARY ATHEROSCLEROSIS OF NATIVE CORONARY ARTERY: ICD-10-CM

## 2023-04-12 DIAGNOSIS — Z98.61 POSTSURGICAL PERCUTANEOUS TRANSLUMINAL CORONARY ANGIOPLASTY STATUS: Primary | ICD-10-CM

## 2023-04-12 PROCEDURE — 93798 PHYS/QHP OP CAR RHAB W/ECG: CPT | Mod: S$GLB,,, | Performed by: INTERNAL MEDICINE

## 2023-04-12 PROCEDURE — 93798 PR CARDIAC REHAB/MONITOR: ICD-10-PCS | Mod: S$GLB,,, | Performed by: INTERNAL MEDICINE

## 2023-04-14 ENCOUNTER — CLINICAL SUPPORT (OUTPATIENT)
Dept: CARDIAC REHAB | Facility: CLINIC | Age: 63
End: 2023-04-14
Payer: COMMERCIAL

## 2023-04-14 DIAGNOSIS — I25.10 ATHEROSCLEROSIS OF NATIVE CORONARY ARTERY OF NATIVE HEART, UNSPECIFIED WHETHER ANGINA PRESENT: ICD-10-CM

## 2023-04-14 DIAGNOSIS — Z98.61 POSTSURGICAL PERCUTANEOUS TRANSLUMINAL CORONARY ANGIOPLASTY STATUS: Primary | ICD-10-CM

## 2023-04-14 PROCEDURE — 93798 PR CARDIAC REHAB/MONITOR: ICD-10-PCS | Mod: S$GLB,,, | Performed by: INTERNAL MEDICINE

## 2023-04-14 PROCEDURE — 93798 PHYS/QHP OP CAR RHAB W/ECG: CPT | Mod: S$GLB,,, | Performed by: INTERNAL MEDICINE

## 2023-04-17 ENCOUNTER — CLINICAL SUPPORT (OUTPATIENT)
Dept: CARDIAC REHAB | Facility: CLINIC | Age: 63
End: 2023-04-17
Payer: COMMERCIAL

## 2023-04-17 ENCOUNTER — OFFICE VISIT (OUTPATIENT)
Dept: CARDIOLOGY | Facility: CLINIC | Age: 63
End: 2023-04-17
Payer: COMMERCIAL

## 2023-04-17 VITALS
DIASTOLIC BLOOD PRESSURE: 60 MMHG | HEIGHT: 65 IN | SYSTOLIC BLOOD PRESSURE: 128 MMHG | OXYGEN SATURATION: 97 % | WEIGHT: 113.75 LBS | HEART RATE: 60 BPM | BODY MASS INDEX: 18.95 KG/M2

## 2023-04-17 DIAGNOSIS — Z98.61 POSTSURGICAL PERCUTANEOUS TRANSLUMINAL CORONARY ANGIOPLASTY STATUS: Primary | ICD-10-CM

## 2023-04-17 DIAGNOSIS — I25.2 HISTORY OF ST ELEVATION MYOCARDIAL INFARCTION (STEMI): Primary | ICD-10-CM

## 2023-04-17 DIAGNOSIS — I25.10 ATHEROSCLEROSIS OF NATIVE CORONARY ARTERY OF NATIVE HEART, UNSPECIFIED WHETHER ANGINA PRESENT: ICD-10-CM

## 2023-04-17 DIAGNOSIS — I10 ESSENTIAL HYPERTENSION: ICD-10-CM

## 2023-04-17 PROBLEM — I25.5 ISCHEMIC CARDIOMYOPATHY: Status: RESOLVED | Noted: 2022-12-27 | Resolved: 2023-04-17

## 2023-04-17 PROCEDURE — 99214 PR OFFICE/OUTPT VISIT, EST, LEVL IV, 30-39 MIN: ICD-10-PCS | Mod: 25,S$GLB,, | Performed by: INTERNAL MEDICINE

## 2023-04-17 PROCEDURE — 4010F ACE/ARB THERAPY RXD/TAKEN: CPT | Mod: CPTII,S$GLB,, | Performed by: INTERNAL MEDICINE

## 2023-04-17 PROCEDURE — 1160F RVW MEDS BY RX/DR IN RCRD: CPT | Mod: CPTII,S$GLB,, | Performed by: INTERNAL MEDICINE

## 2023-04-17 PROCEDURE — 4010F PR ACE/ARB THEARPY RXD/TAKEN: ICD-10-PCS | Mod: CPTII,S$GLB,, | Performed by: INTERNAL MEDICINE

## 2023-04-17 PROCEDURE — 1159F MED LIST DOCD IN RCRD: CPT | Mod: CPTII,S$GLB,, | Performed by: INTERNAL MEDICINE

## 2023-04-17 PROCEDURE — 3078F PR MOST RECENT DIASTOLIC BLOOD PRESSURE < 80 MM HG: ICD-10-PCS | Mod: CPTII,S$GLB,, | Performed by: INTERNAL MEDICINE

## 2023-04-17 PROCEDURE — 3078F DIAST BP <80 MM HG: CPT | Mod: CPTII,S$GLB,, | Performed by: INTERNAL MEDICINE

## 2023-04-17 PROCEDURE — 3008F PR BODY MASS INDEX (BMI) DOCUMENTED: ICD-10-PCS | Mod: CPTII,S$GLB,, | Performed by: INTERNAL MEDICINE

## 2023-04-17 PROCEDURE — 1160F PR REVIEW ALL MEDS BY PRESCRIBER/CLIN PHARMACIST DOCUMENTED: ICD-10-PCS | Mod: CPTII,S$GLB,, | Performed by: INTERNAL MEDICINE

## 2023-04-17 PROCEDURE — 93798 PR CARDIAC REHAB/MONITOR: ICD-10-PCS | Mod: S$GLB,,, | Performed by: INTERNAL MEDICINE

## 2023-04-17 PROCEDURE — 3008F BODY MASS INDEX DOCD: CPT | Mod: CPTII,S$GLB,, | Performed by: INTERNAL MEDICINE

## 2023-04-17 PROCEDURE — 99214 OFFICE O/P EST MOD 30 MIN: CPT | Mod: 25,S$GLB,, | Performed by: INTERNAL MEDICINE

## 2023-04-17 PROCEDURE — 99999 PR PBB SHADOW E&M-EST. PATIENT-LVL IV: ICD-10-PCS | Mod: PBBFAC,,, | Performed by: INTERNAL MEDICINE

## 2023-04-17 PROCEDURE — 3074F SYST BP LT 130 MM HG: CPT | Mod: CPTII,S$GLB,, | Performed by: INTERNAL MEDICINE

## 2023-04-17 PROCEDURE — 93798 PHYS/QHP OP CAR RHAB W/ECG: CPT | Mod: S$GLB,,, | Performed by: INTERNAL MEDICINE

## 2023-04-17 PROCEDURE — 3074F PR MOST RECENT SYSTOLIC BLOOD PRESSURE < 130 MM HG: ICD-10-PCS | Mod: CPTII,S$GLB,, | Performed by: INTERNAL MEDICINE

## 2023-04-17 PROCEDURE — 1159F PR MEDICATION LIST DOCUMENTED IN MEDICAL RECORD: ICD-10-PCS | Mod: CPTII,S$GLB,, | Performed by: INTERNAL MEDICINE

## 2023-04-17 PROCEDURE — 99999 PR PBB SHADOW E&M-EST. PATIENT-LVL IV: CPT | Mod: PBBFAC,,, | Performed by: INTERNAL MEDICINE

## 2023-04-17 RX ORDER — CLOPIDOGREL BISULFATE 75 MG/1
75 TABLET ORAL DAILY
Qty: 30 TABLET | Refills: 11 | Status: SHIPPED | OUTPATIENT
Start: 2023-04-17 | End: 2024-04-16

## 2023-04-17 RX ORDER — LEVOTHYROXINE SODIUM 50 UG/1
50 TABLET ORAL
COMMUNITY

## 2023-04-17 NOTE — PATIENT INSTRUCTIONS
You need to get your cholesterol checked after a 12 hour fast.   No food or drink other than water, and any medication that needs to be taken, for 12 hours prior to the blood test.  You can eat as soon as you want after the sample blood sample is taken.     Get Plavix Response Test before you take your first dose of clopidogrel.      Take your blood pressure each morning and evening for 1 week.  Record the blood pressure, pulse, date and time (AM or PM) and then send the the results all together at 1 time to Dr. Helms.     Take BP after emptying your bladder and then sitting quietly for 5 minutes.  When you take your blood pressure, you should be sitting straight up in a chair, both feet flat on the floor, and the arm with the blood pressure cuff at chest height resting on a table.

## 2023-04-17 NOTE — PROGRESS NOTES
Chart has been dictated using voice recognition software.  It is not been reviewed carefully for any transcriptional errors due to this technology.   Subjective:   Patient ID:  Nai Boo is a 62 y.o. female who presents for follow-up of No chief complaint on file.      HPI: Patient with hypertension admitted 25-28-Dec-2023 with RCA STEMI.  Patient had emmergency PCI of RCA which was a large dominant vessel extending to the lateral wall.  Patient needed an IABP because of poor flow following the stent placement.  Patient successfully weaned the following day and discharged 3 days later.  EF following PCI showed an EF of 35-40% but improved to 60% on an echo cardiogram 3 months later with no wall motion abnormalities.    Patient denies any chest discomfort on exertion or at rest. She has occasional chest discomfort that is very different from ischemic symptoms.   Patient denies any dyspnea at rest or on exertion, orthopnea, PND, or edema.  Notes some mild dyspnea going up steps.  Has been in cardiac rehab for 2 1/2 months. Patient denies any palpitations or syncope. Has occasional orthostatic dizziness.     Past Medical History:   Diagnosis Date    Hypertension     Trigger finger        Outpatient Medications Prior to Visit   Medication Sig Dispense Refill    aspirin (ECOTRIN) 81 MG EC tablet Take 1 tablet (81 mg total) by mouth once daily. 30 tablet 11    atorvastatin (LIPITOR) 80 MG tablet Take 1 tablet (80 mg total) by mouth once daily. (Patient not taking: Reported on 2/2/2023) 90 tablet 3    carboxymethylcellulose sodium (THERATEARS OPHT) Apply to eye.      metoprolol succinate (TOPROL-XL) 50 MG 24 hr tablet Take 1 tablet (50 mg total) by mouth once daily. 30 tablet 11    multivitamin capsule Take 1 capsule by mouth once daily.      prasugreL (EFFIENT) 5 mg Tab Take 1 tablet (5 mg total) by mouth once daily. 30 tablet 11    spironolactone (ALDACTONE) 25 MG tablet Take 1 tablet (25 mg total) by mouth  "once daily. 30 tablet 11    valsartan (DIOVAN) 40 MG tablet Take 1 tablet (40 mg total) by mouth once daily. 90 tablet 3     No facility-administered medications prior to visit.       Review of Systems   HENT:  Positive for nosebleeds (mild left nostril on occasion).    Cardiovascular:         Patient has c/o feet turning while in the cold with color returning to normal (no hyperemia)    Respiratory:  Negative for hemoptysis.    Hematologic/Lymphatic: Negative for bleeding problem. Bruises/bleeds easily.   Gastrointestinal:  Negative for hematemesis, hematochezia and melena.   Genitourinary:  Negative for hematuria.   Neurological:  Positive for numbness (fingertips, left foot - especially in the cold). Negative for focal weakness and weakness.    Objective:   Physical Exam  Vital signs only:  /60 (BP Location: Left arm, Patient Position: Sitting, BP Method: Medium (Automatic))   Pulse 60   Ht 5' 5" (1.651 m)   Wt 51.6 kg (113 lb 12.1 oz)   LMP 03/11/2021   SpO2 97%   BMI 18.93 kg/m²       Lab Results   Component Value Date     01/22/2023    K 4.5 01/22/2023    BUN 16 01/22/2023    CREATININE 0.9 01/22/2023     01/22/2023    HGBA1C 5.2 12/26/2022     (H) 01/22/2023    CHOL 149 12/26/2022    HDL 34 (L) 12/26/2022    LDLCALC 93.0 12/26/2022    TRIG 110 12/26/2022    CHOLHDL 22.8 12/26/2022    HGB 14.7 01/30/2023    HCT 45.2 01/30/2023     (L) 01/30/2023       Assessment:     1. History of ST elevation myocardial infarction (STEMI)    2. Essential hypertension      Patient has no symptoms of cardiac ischemia, heart failure, or significant arrhythmias.  The patient's left ventricular function has recovered to normal.  Is unclear why she is still experiencing some shortness breath going upstairs as she is improved her cardiovascular workload during cardiac rehabilitation.  The patient is not on a statin right now with her LDL around 90.  A repeat lipid profile will be obtained in " the patient will then be put on a drug that inhibits PCSK9 function.    It is not clear why the patient is still having significant bruising.  A platelet response test on 5 mg of prasugrel will be obtained to see the effect of 5 mg of prasugrel has on platelet reactivity.  The patient will then be deescalated to clopidogrel and maintained on dual antiplatelet therapy through the remainder of the year with aspirin and clopidogrel.  Her other medications will remain unchanged.  The patient has been advised on continuing exercise after she completes her cardiac rehabilitation program in 2 weeks.  The patient will also obtain home blood pressures twice a day for a week to determine whether she needs more intensive antihypertensive therapy.      Unless there are intervening problems, patient should return for re-evaluation in 6 months.   Plan:     Diagnoses and all orders for this visit:    History of ST elevation myocardial infarction (STEMI)    Essential hypertension          Agustin Helms MD  Consultative Cardiology

## 2023-04-17 NOTE — Clinical Note
Thank you for allowing me to follow-up with Nai Boo for coronary artery disease. Please see my note for details of this encounter. If you have any questions, please contact me.  Thank you again for allowing to participate in the care of this patient.

## 2023-04-17 NOTE — PROGRESS NOTES
Session: 24 Session Follow Up   Cardiac Rehab Individual Treatment Plan - Reassessment      Patient Name: Nai Boo MRN: [id number]   : 1960   Age: 62 y.o.   Primary Diagnosis: PTCA      Psychosocial Assessment:   Living Arrangements: Lives with spouse  Family Support: family and spouse  Self Reported: decrease Effective Coping Skills and Stress  Displays: happiness and calmness  Medication: not applicable    Psychosocial Plan:   Goals:  Improved psychosocial coping strategies: In Progress  Verbalizes coping mechanisms: In Progress  Reduce manifestation of anxiety: In Progress  Reduce manifestation of stress: In Progress  Maintain positive support system: In Progress  Maintain positive outlook: In Progress  Improve overall quality of life: In Progress    Comments on Goal Progression:  Patient is working on achieving goals to decrease stress/anxiety.    Interventions:  Patient to Self Report Emotional Changes at Session Check In  Recommend Physical Activity  Recommend Attending Education Lectures  Notify MD: No  Program Referral: Declined  Pharmaceutical Intervention/Therapy: Declined  Other Needs: not applicable  Stage of Readiness to Change: Action    Education:  Stress Management; verbalizes understanding; Date: 3/24/2023 & 2023  Stress; verbalizes understanding; Date: 3/24/2023 & 2023    Comments:  Patient reports to have existing stress at work.  She declines from referral for Psychiatry or PCP & states that she is noting some improvements.  She reports to have good support at home.  She has been instructed to notify staff in the event that circumstances worsen.  Patient verbalizes understanding.    Other Core Components/Risk Factors Assessment:   RISK FACTORS:  hyperlipidemia, positive family history, sedentary lifestyle, stress, anxiety.    Learning Barriers: None    Medication Compliance: has been compliant with the medicaiton regimen    Other Core Components/Risk Factors Plan:    Goals:  Decrease cholesterol level: In Progress  Increase exercise tolerance: In Progress  Increase knowledge of CAD: In Progress  Learn more about healthy eating: In Progress    Comments on Goal Progression:  Patient is working on achieving goals to decrease risk factors for CAD.    Interventions:  Individual Education/ Counseling: Yes  Physician Referral: No    Education:    blood pressure meds, verbalizes understanding; Date: 2/17/2023  cholesterol meds, verbalizes understanding; Date: 2/24/2023  fluid overload/CHF, verbalizes understanding; Date: 4/14/2023  risk factors, verbalizes understanding; Date: 3/17/2023  stress, verbalizes understanding; Date: 3/24/2023 & 4/14/2023         Education method adapted to patients education level and preferred method of learning.  Method: explanation  handouts    Comments:  Encouraged for patient to continue following recommendations to decrease risk factors for CAD.    Other Core Components/Hypertension Assessment:   BP Range: 122-88/70-50  BP at Goal: Yes  Patient reported symptoms: none    Other Core Components/Hypertension Plan:   Goals:  Blood Pressure <130/80    Comments on Goal Progression:  Patient is monitoring BP at home & reports similar readings as obtained in cardiac rehab.    Interventions:  Med Card Reconciled: Yes  Encourage medication compliance  Encourage sodium reduction  Recommend physical activity  Educate on contributory factors  Reduce stress, anxiety, anger, depression, and/or chronic pain  Encourage home blood pressure monitoring  Recommend daily weights    Education:    Risk Factors; verbalizes understanding; Date: 3/17/2023  Medication I; verbalizes understanding; Date: 2/17/2023  Medication II; verbalizes understanding; Date: 2/24/2023  Stress; verbalizes understanding; Date: 3/24/2023         Comments:  Encouraged patient to continue with monitoring BP at home & to report any lightheadedness or dizziness to cardiologist.    Does the patient  have Heart Failure? No      Other Core Components/Tobacco Cessation Assessment:   Smoking Status: lifetime non-smoker  Smoking Cessation Barriers:  N/A  Stage of Readiness to Change: Maintenance    Other Core Components/Tobacco Cessation Plan:   Goals:  Maintain non-smoking status    Comments on Goal Progression:  Non smoker.    Interventions:  Maintains non-smoking status    Education:    Risk Factors; verbalizes understanding; Date: 3/17/2023 & 4/14/2023         Comments:  Non smoker.    Monse Kearney, RN  Cardiac Rehab Nurse

## 2023-04-18 ENCOUNTER — LAB VISIT (OUTPATIENT)
Dept: LAB | Facility: HOSPITAL | Age: 63
End: 2023-04-18
Attending: INTERNAL MEDICINE
Payer: COMMERCIAL

## 2023-04-18 DIAGNOSIS — I25.2 HISTORY OF ST ELEVATION MYOCARDIAL INFARCTION (STEMI): ICD-10-CM

## 2023-04-18 LAB
CHOLEST SERPL-MCNC: 174 MG/DL (ref 120–199)
CHOLEST/HDLC SERPL: 3.9 {RATIO} (ref 2–5)
HDLC SERPL-MCNC: 45 MG/DL (ref 40–75)
HDLC SERPL: 25.9 % (ref 20–50)
LDLC SERPL CALC-MCNC: 113.6 MG/DL (ref 63–159)
NONHDLC SERPL-MCNC: 129 MG/DL
PLATELET RESPONSE PLAVIX: 94 PRU (ref 194–418)
TRIGL SERPL-MCNC: 77 MG/DL (ref 30–150)

## 2023-04-18 PROCEDURE — 80061 LIPID PANEL: CPT | Performed by: INTERNAL MEDICINE

## 2023-04-18 PROCEDURE — 36415 COLL VENOUS BLD VENIPUNCTURE: CPT | Performed by: INTERNAL MEDICINE

## 2023-04-18 PROCEDURE — 85576 BLOOD PLATELET AGGREGATION: CPT | Performed by: INTERNAL MEDICINE

## 2023-04-19 ENCOUNTER — CLINICAL SUPPORT (OUTPATIENT)
Dept: CARDIAC REHAB | Facility: CLINIC | Age: 63
End: 2023-04-19
Payer: COMMERCIAL

## 2023-04-19 DIAGNOSIS — Z98.61 POSTSURGICAL PERCUTANEOUS TRANSLUMINAL CORONARY ANGIOPLASTY STATUS: Primary | ICD-10-CM

## 2023-04-19 DIAGNOSIS — I25.10 CORONARY ATHEROSCLEROSIS OF NATIVE CORONARY ARTERY: ICD-10-CM

## 2023-04-19 PROCEDURE — 93798 PR CARDIAC REHAB/MONITOR: ICD-10-PCS | Mod: S$GLB,,, | Performed by: INTERNAL MEDICINE

## 2023-04-19 PROCEDURE — 93798 PHYS/QHP OP CAR RHAB W/ECG: CPT | Mod: S$GLB,,, | Performed by: INTERNAL MEDICINE

## 2023-04-21 ENCOUNTER — CLINICAL SUPPORT (OUTPATIENT)
Dept: CARDIAC REHAB | Facility: CLINIC | Age: 63
End: 2023-04-21
Payer: COMMERCIAL

## 2023-04-21 DIAGNOSIS — Z98.61 POSTSURGICAL PERCUTANEOUS TRANSLUMINAL CORONARY ANGIOPLASTY STATUS: Primary | ICD-10-CM

## 2023-04-21 DIAGNOSIS — I25.10 ATHEROSCLEROSIS OF NATIVE CORONARY ARTERY OF NATIVE HEART, UNSPECIFIED WHETHER ANGINA PRESENT: ICD-10-CM

## 2023-04-21 PROCEDURE — 93798 PR CARDIAC REHAB/MONITOR: ICD-10-PCS | Mod: S$GLB,,, | Performed by: INTERNAL MEDICINE

## 2023-04-21 PROCEDURE — 93798 PHYS/QHP OP CAR RHAB W/ECG: CPT | Mod: S$GLB,,, | Performed by: INTERNAL MEDICINE

## 2023-04-24 ENCOUNTER — CLINICAL SUPPORT (OUTPATIENT)
Dept: CARDIAC REHAB | Facility: CLINIC | Age: 63
End: 2023-04-24
Payer: COMMERCIAL

## 2023-04-24 DIAGNOSIS — Z98.61 POSTSURGICAL PERCUTANEOUS TRANSLUMINAL CORONARY ANGIOPLASTY STATUS: Primary | ICD-10-CM

## 2023-04-24 DIAGNOSIS — I25.10 ATHEROSCLEROSIS OF NATIVE CORONARY ARTERY OF NATIVE HEART, UNSPECIFIED WHETHER ANGINA PRESENT: ICD-10-CM

## 2023-04-24 PROCEDURE — 93798 PHYS/QHP OP CAR RHAB W/ECG: CPT | Mod: S$GLB,,, | Performed by: INTERNAL MEDICINE

## 2023-04-24 PROCEDURE — 93798 PR CARDIAC REHAB/MONITOR: ICD-10-PCS | Mod: S$GLB,,, | Performed by: INTERNAL MEDICINE

## 2023-04-26 ENCOUNTER — PATIENT MESSAGE (OUTPATIENT)
Dept: CARDIOLOGY | Facility: CLINIC | Age: 63
End: 2023-04-26
Payer: COMMERCIAL

## 2023-04-26 ENCOUNTER — HOSPITAL ENCOUNTER (OUTPATIENT)
Dept: CARDIOLOGY | Facility: HOSPITAL | Age: 63
Discharge: HOME | End: 2023-04-26
Attending: INTERNAL MEDICINE
Payer: COMMERCIAL

## 2023-04-26 VITALS
SYSTOLIC BLOOD PRESSURE: 105 MMHG | BODY MASS INDEX: 18.49 KG/M2 | HEART RATE: 57 BPM | HEIGHT: 65 IN | WEIGHT: 111 LBS | DIASTOLIC BLOOD PRESSURE: 62 MMHG

## 2023-04-26 DIAGNOSIS — I25.10 ATHEROSCLEROSIS OF NATIVE CORONARY ARTERY OF NATIVE HEART, UNSPECIFIED WHETHER ANGINA PRESENT: ICD-10-CM

## 2023-04-26 DIAGNOSIS — Z98.61 POSTSURGICAL PERCUTANEOUS TRANSLUMINAL CORONARY ANGIOPLASTY STATUS: ICD-10-CM

## 2023-04-26 LAB
CV STRESS BASE HR: 57 BPM
DIASTOLIC BLOOD PRESSURE: 62 MMHG
OHS CV CPX 1 MINUTE RECOVERY HEART RATE: 130 BPM
OHS CV CPX 85 PERCENT MAX PREDICTED HEART RATE MALE: 129
OHS CV CPX ABDOMINAL GIRTH: 28 CM
OHS CV CPX ANAEROBIC THRESHOLD DIASTOLIC BLOOD PRESSURE: 81 MMHG
OHS CV CPX ANAEROBIC THRESHOLD HEART RATE: 125
OHS CV CPX ANAEROBIC THRESHOLD RATE PRESSURE PRODUCT: NORMAL
OHS CV CPX ANAEROBIC THRESHOLD SYSTOLIC BLOOD PRESSURE: 129
OHS CV CPX DATA GRADE - AT: 14.9
OHS CV CPX DATA GRADE - PEAK: 18.9
OHS CV CPX DATA O2 SAT - PEAK: 92
OHS CV CPX DATA O2 SAT - REST: 96
OHS CV CPX DATA SPEED - AT: 3.9
OHS CV CPX DATA SPEED - PEAK: 5.1
OHS CV CPX DATA TIME - AT: 7.12
OHS CV CPX DATA TIME - PEAK: 9.02
OHS CV CPX DATA VE/VCO2 - AT: 34
OHS CV CPX DATA VE/VCO2 - PEAK: 38
OHS CV CPX DATA VE/VO2 - AT: 29
OHS CV CPX DATA VE/VO2 - PEAK: 39
OHS CV CPX DATA VO2 - AT: 19.1
OHS CV CPX DATA VO2 - PEAK: 27.6
OHS CV CPX DATA VO2 - REST: 3.7
OHS CV CPX ESTIMATED METS: 15
OHS CV CPX FEV1/FVC: 0.81
OHS CV CPX FORCED EXPIRATORY VOLUME: 1.47
OHS CV CPX FORCED VITAL CAPACITY (FVC): 1.82
OHS CV CPX HIGHEST VO: 27.6
OHS CV CPX MAX PREDICTED HEART RATE: 152
OHS CV CPX MAXIMAL VOLUNTARY VENTILATION (MVV) PREDICTED: 58.8
OHS CV CPX MAXIMAL VOLUNTARY VENTILATION (MVV): 35
OHS CV CPX MAXIUMUM EXERCISE VENTILATION (VE MAX): 43.6
OHS CV CPX PATIENT AGE: 61
OHS CV CPX PATIENT HEIGHT IN: 65
OHS CV CPX PATIENT IS FEMALE AGE 11-19: 0
OHS CV CPX PATIENT IS FEMALE AGE GREATER THAN 19: 1
OHS CV CPX PATIENT IS FEMALE AGE LESS THAN 11: 0
OHS CV CPX PATIENT IS FEMALE: 1
OHS CV CPX PATIENT IS MALE AGE 11-25: 0
OHS CV CPX PATIENT IS MALE AGE GREATER THAN 25: 0
OHS CV CPX PATIENT IS MALE AGE LESS THAN 11: 0
OHS CV CPX PATIENT IS MALE GREATER THAN 18: 0
OHS CV CPX PATIENT IS MALE LESS THAN OR EQUAL TO 18: 0
OHS CV CPX PATIENT IS MALE: 0
OHS CV CPX PATIENT WEIGHT RETURNED IN OZ: 1776
OHS CV CPX PEAK DIASTOLIC BLOOD PRESSURE: 89 MMHG
OHS CV CPX PEAK HEAR RATE: 142 BPM
OHS CV CPX PEAK RATE PRESSURE PRODUCT: NORMAL
OHS CV CPX PEAK SYSTOLIC BLOOD PRESSURE: 149 MMHG
OHS CV CPX PERCENT BODY FAT: 14
OHS CV CPX PERCENT MAX PREDICTED HEART RATE ACHIEVED: 93
OHS CV CPX PREDICTED VO2: 27.5 ML/KG/MIN
OHS CV CPX RATE PRESSURE PRODUCT PRESENTING: 5985
OHS CV CPX REST PET CO2: 18
OHS CV CPX VE/VCO2 SLOPE: 27.8
STRESS ECHO POST EXERCISE DUR MIN: 9 MINUTES
STRESS ECHO POST EXERCISE DUR SEC: 1 SECONDS
STRESS ST DEPRESSION: 0.5 MM
SYSTOLIC BLOOD PRESSURE: 105 MMHG

## 2023-04-26 PROCEDURE — 94621 CARDIOPULM EXERCISE TESTING: CPT

## 2023-04-26 PROCEDURE — 94621 CARDIOPULM EXERCISE TESTING: CPT | Mod: 26,,, | Performed by: INTERNAL MEDICINE

## 2023-04-26 PROCEDURE — 94621 CARDIOPULMONARY EXERCISE TESTING (CUPID ONLY): ICD-10-PCS | Mod: 26,,, | Performed by: INTERNAL MEDICINE

## 2023-04-27 ENCOUNTER — DOCUMENTATION ONLY (OUTPATIENT)
Dept: CARDIAC REHAB | Facility: CLINIC | Age: 63
End: 2023-04-27
Payer: COMMERCIAL

## 2023-04-27 NOTE — PROGRESS NOTES
Nai has completed 31 out of 36 exercise session of Phase II cardiac rehab.  A follow up reassessment will be completed at exit interview.     24 Session Follow Up   Cardiac Rehab Individual Treatment Plan - Reassessment      Patient Name: Nai Boo MRN: [id number]   : 1960   Age: 62 y.o.   Primary Diagnosis: PTCA/STENT Date of Event: 22   EF: 35-40%  Risk Stratification: moderate  Referring Physician: EFFRON   Exercise Assessment:     Angina with exercise?: No   ST Depression with Exercise?: No  Fall Risk: No   Assistive Devices:  independent  There were no limitations noted by the patient.  Comments on Progression: Nai is progressing well and her workloads will continue to be increased as she tolerates exercise intensity.     Exercise Plan:   Goals:  CR Exercise Goals: Attend Cardiac Rehab 3 times/week: In Progress  Home Aerobic Exercise: 2 additional days/week for 30-60 minutes: In Progress  Intensity of 12-15 on the Rate of Perceived Exertion (RPE) scale: In Progress  30% increase in entry estimated METS: 16.9 : In Progress  5 days/week for 30-60 minutes: In Progress    Comments on Goal Progression:  Patient Consistency: consistent with attendance  Home exercise? Yes: Walking; Frequency: 2 non-rehab days per week; Duration 45 minutes  Patient reports intensity rate 11-14 on RPE scale    Intervention/Recommendations:   Discussed importance of regular attendance to cardiac rehab class    Exercise Prescription:  THR Range    Mode: Nustep  Elliptical   Frequency:  3 days/week   Duration:  30-60 minutes   Intensity:  12-15 RPE   Resistance Training:  Yes: 5 lb weights with 10-15 reps based on strength and range of motion and adjusted accordingly     Home Prescription:  Mode Aerobic exercise   Frequency: 2- 3 days/week   Duration: 30-60 minutes   Resistance Training: None     Education:  Arthritis/Osteporosis; verbalizes understanding; Date: 3-13-23  Exercise Terminology; verbalizes  understanding; Date: 3-20-23  Muscular Anatomy; verbalizes understanding; Date: 3-27-23  The Exercise Program; verbalizes understanding; Date: 4-3-23    Comments:  I reviewed exercise recommendations with Nai.  I encouraged her to continue exercising.  She stated understanding.      The exercise prescription will continue to be adjusted based on tolerance of exercise intensity by patient.    Johnathon Ruth, CEP     12 Session Follow Up   Cardiac Rehab Individual Treatment Plan - Reassessment    Patient Name: Nai Boo MRN: [id number]   : 1960   Age: 62 y.o.   Primary Diagnosis: s/p PTCA/stent, CAD, HLD, HTN, ICM, h/o STEMI    Nutrition Assessment:     Anthropometrics    Height 65 inches   Weight 112.2 lbs   BMI 18.6     Patient confirms she is taking nothing for cholesterol control.  Difficulty Chewing or Swallowing: no  Current Exercise: See Exercise Physiologist Note  Food Safety/Food Preparation:  shares with   Living Arrangements/Family Support: Lives with spouse  Cultural/Spiritual/Personal Preferences: not applicable   Barriers to Education: none identified  Stage of Change Related to Diet Habits: Action  Recent Changes to Diet: No  Food Diary: Completed      Nutrition Plan:   Goals:  LDL-C < 70 (for high risk patients)  Hgb A1c < 7%  BMI < 25 and abdominal girth < 40M/<35 F  2 gram sodium, Mediterranean diet: In Progress  Rehab weight goal of maintenance  Fish intake (non-fried varieties) to a goal of 2-3 servings per week: In Progress  Increase fruit and vegetable intake: In Progress    Comments on Goal Progression:  Pt is still struggling to incorporate vegetables into diet as well as staying hydrated at work, discussed several apps she may find helpful to track progress    Interventions:  Dietitian Consult: No  Patient to participate in Cardiac Rehab sessions three times a week  Weekly Dietitian Weight Check  Nutrition Recommendations Provided: Verbal and Written,  Reviewed  Follow Up Plan for Ongoing Self-Management Support    Education:  Food Labels; verbalizes understanding; Date: 3/15/23  Seafood; verbalizes understanding; Date: 23  Vegetarianism; verbalizes understanding; Date: 3/8/23  Weight Management; verbalizes understanding; Date: 3/29/23  Food Additives; verbalizes understanding; Date: 3/22/23    Comments:   Discussed ways to incorporate healthy snacks, eating on a schedule, and monitoring sodium intake for heart health.    Diabetes  Is the patient diabetic? No      Allison Barcenas MS, RDN/LDN     Session: 24 Session Follow Up   Cardiac Rehab Individual Treatment Plan - Reassessment      Patient Name: Nai Boo MRN: [id number]   : 1960   Age: 62 y.o.   Primary Diagnosis: PTCA      Psychosocial Assessment:   Living Arrangements: Lives with spouse  Family Support: family and spouse  Self Reported: decrease Effective Coping Skills and Stress  Displays: happiness and calmness  Medication: not applicable    Psychosocial Plan:   Goals:  Improved psychosocial coping strategies: In Progress  Verbalizes coping mechanisms: In Progress  Reduce manifestation of anxiety: In Progress  Reduce manifestation of stress: In Progress  Maintain positive support system: In Progress  Maintain positive outlook: In Progress  Improve overall quality of life: In Progress    Comments on Goal Progression:  Patient is working on achieving goals to decrease stress/anxiety.    Interventions:  Patient to Self Report Emotional Changes at Session Check In  Recommend Physical Activity  Recommend Attending Education Lectures  Notify MD: No  Program Referral: Declined  Pharmaceutical Intervention/Therapy: Declined  Other Needs: not applicable  Stage of Readiness to Change: Action    Education:  Stress Management; verbalizes understanding; Date: 3/24/2023 & 2023  Stress; verbalizes understanding; Date: 3/24/2023 & 2023    Comments:  Patient reports to have existing stress  at work.  She declines from referral for Psychiatry or PCP & states that she is noting some improvements.  She reports to have good support at home.  She has been instructed to notify staff in the event that circumstances worsen.  Patient verbalizes understanding.    Other Core Components/Risk Factors Assessment:   RISK FACTORS:  hyperlipidemia, positive family history, sedentary lifestyle, stress, anxiety.    Learning Barriers: None    Medication Compliance: has been compliant with the medicaiton regimen    Other Core Components/Risk Factors Plan:   Goals:  Decrease cholesterol level: In Progress  Increase exercise tolerance: In Progress  Increase knowledge of CAD: In Progress  Learn more about healthy eating: In Progress    Comments on Goal Progression:  Patient is working on achieving goals to decrease risk factors for CAD.    Interventions:  Individual Education/ Counseling: Yes  Physician Referral: No    Education:    blood pressure meds, verbalizes understanding; Date: 2/17/2023  cholesterol meds, verbalizes understanding; Date: 2/24/2023  fluid overload/CHF, verbalizes understanding; Date: 4/14/2023  risk factors, verbalizes understanding; Date: 3/17/2023  stress, verbalizes understanding; Date: 3/24/2023 & 4/14/2023         Education method adapted to patients education level and preferred method of learning.  Method: explanation  handouts    Comments:  Encouraged for patient to continue following recommendations to decrease risk factors for CAD.    Other Core Components/Hypertension Assessment:   BP Range: 122-88/70-50  BP at Goal: Yes  Patient reported symptoms: none    Other Core Components/Hypertension Plan:   Goals:  Blood Pressure <130/80    Comments on Goal Progression:  Patient is monitoring BP at home & reports similar readings as obtained in cardiac rehab.    Interventions:  Med Card Reconciled: Yes  Encourage medication compliance  Encourage sodium reduction  Recommend physical activity  Educate  on contributory factors  Reduce stress, anxiety, anger, depression, and/or chronic pain  Encourage home blood pressure monitoring  Recommend daily weights    Education:    Risk Factors; verbalizes understanding; Date: 3/17/2023  Medication I; verbalizes understanding; Date: 2/17/2023  Medication II; verbalizes understanding; Date: 2/24/2023  Stress; verbalizes understanding; Date: 3/24/2023         Comments:  Encouraged patient to continue with monitoring BP at home & to report any lightheadedness or dizziness to cardiologist.    Does the patient have Heart Failure? No      Other Core Components/Tobacco Cessation Assessment:   Smoking Status: lifetime non-smoker  Smoking Cessation Barriers:  N/A  Stage of Readiness to Change: Maintenance    Other Core Components/Tobacco Cessation Plan:   Goals:  Maintain non-smoking status    Comments on Goal Progression:  Non smoker.    Interventions:  Maintains non-smoking status    Education:    Risk Factors; verbalizes understanding; Date: 3/17/2023 & 4/14/2023         Comments:  Non smoker.    Monse Kearney RN  Cardiac Rehab Nurse

## 2023-04-28 ENCOUNTER — TELEPHONE (OUTPATIENT)
Dept: CARDIOLOGY | Facility: CLINIC | Age: 63
End: 2023-04-28
Payer: COMMERCIAL

## 2023-04-28 ENCOUNTER — CLINICAL SUPPORT (OUTPATIENT)
Dept: CARDIAC REHAB | Facility: CLINIC | Age: 63
End: 2023-04-28
Payer: COMMERCIAL

## 2023-04-28 DIAGNOSIS — Z98.61 POSTSURGICAL PERCUTANEOUS TRANSLUMINAL CORONARY ANGIOPLASTY STATUS: Primary | ICD-10-CM

## 2023-04-28 DIAGNOSIS — I25.2 HISTORY OF ST ELEVATION MYOCARDIAL INFARCTION (STEMI): Primary | ICD-10-CM

## 2023-04-28 DIAGNOSIS — I25.10 ATHEROSCLEROSIS OF NATIVE CORONARY ARTERY OF NATIVE HEART WITHOUT ANGINA PECTORIS: ICD-10-CM

## 2023-04-28 DIAGNOSIS — E78.2 MIXED HYPERLIPIDEMIA: ICD-10-CM

## 2023-04-28 PROCEDURE — 93798 PR CARDIAC REHAB/MONITOR: ICD-10-PCS | Mod: S$GLB,,, | Performed by: INTERNAL MEDICINE

## 2023-04-28 PROCEDURE — 93798 PHYS/QHP OP CAR RHAB W/ECG: CPT | Mod: S$GLB,,, | Performed by: INTERNAL MEDICINE

## 2023-04-28 NOTE — TELEPHONE ENCOUNTER
Contacted patient to let her know that PRU was too low on prasugrel 5 mg qd.  Has less bruising with clopidogrel 75 mg qd.  Will get repeat PRU next week on clopidogrel.    LDL is too high.  Given she had significant increase in LFTs with statin, will start on a PCSK9 inhibitor.  Prescription for evolocumab sent to [facility].  Patient should be able to use copay card.  Will get repeat lipid profile 4 weeks after starting evolocumab.     Patient acknowledged understanding of information and agreement with plan.

## 2023-05-01 ENCOUNTER — CLINICAL SUPPORT (OUTPATIENT)
Dept: CARDIAC REHAB | Facility: CLINIC | Age: 63
End: 2023-05-01
Payer: COMMERCIAL

## 2023-05-01 DIAGNOSIS — Z98.61 POSTSURGICAL PERCUTANEOUS TRANSLUMINAL CORONARY ANGIOPLASTY STATUS: Primary | ICD-10-CM

## 2023-05-01 DIAGNOSIS — I25.10 ATHEROSCLEROSIS OF NATIVE CORONARY ARTERY OF NATIVE HEART, UNSPECIFIED WHETHER ANGINA PRESENT: ICD-10-CM

## 2023-05-03 ENCOUNTER — CLINICAL SUPPORT (OUTPATIENT)
Dept: CARDIAC REHAB | Facility: CLINIC | Age: 63
End: 2023-05-03
Payer: COMMERCIAL

## 2023-05-03 DIAGNOSIS — I25.10 CORONARY ARTERY DISEASE, UNSPECIFIED VESSEL OR LESION TYPE, UNSPECIFIED WHETHER ANGINA PRESENT, UNSPECIFIED WHETHER NATIVE OR TRANSPLANTED HEART: ICD-10-CM

## 2023-05-03 DIAGNOSIS — Z98.61 POSTSURGICAL PERCUTANEOUS TRANSLUMINAL CORONARY ANGIOPLASTY STATUS: Primary | ICD-10-CM

## 2023-05-03 PROCEDURE — 93798 PHYS/QHP OP CAR RHAB W/ECG: CPT | Mod: S$GLB,,, | Performed by: INTERNAL MEDICINE

## 2023-05-03 PROCEDURE — 93798 PR CARDIAC REHAB/MONITOR: ICD-10-PCS | Mod: S$GLB,,, | Performed by: INTERNAL MEDICINE

## 2023-05-04 ENCOUNTER — PATIENT MESSAGE (OUTPATIENT)
Dept: CARDIOLOGY | Facility: CLINIC | Age: 63
End: 2023-05-04
Payer: COMMERCIAL

## 2023-05-04 ENCOUNTER — TELEPHONE (OUTPATIENT)
Dept: PHARMACY | Facility: CLINIC | Age: 63
End: 2023-05-04
Payer: COMMERCIAL

## 2023-05-04 ENCOUNTER — LAB VISIT (OUTPATIENT)
Dept: LAB | Facility: HOSPITAL | Age: 63
End: 2023-05-04
Attending: INTERNAL MEDICINE
Payer: COMMERCIAL

## 2023-05-04 ENCOUNTER — TELEPHONE (OUTPATIENT)
Dept: CARDIOLOGY | Facility: CLINIC | Age: 63
End: 2023-05-04
Payer: COMMERCIAL

## 2023-05-04 DIAGNOSIS — I25.5 ISCHEMIC CARDIOMYOPATHY: ICD-10-CM

## 2023-05-04 DIAGNOSIS — E78.2 MIXED HYPERLIPIDEMIA: ICD-10-CM

## 2023-05-04 DIAGNOSIS — I25.2 HISTORY OF ST ELEVATION MYOCARDIAL INFARCTION (STEMI): ICD-10-CM

## 2023-05-04 LAB
ALBUMIN SERPL BCP-MCNC: 4.1 G/DL (ref 3.5–5.2)
ALP SERPL-CCNC: 82 U/L (ref 55–135)
ALT SERPL W/O P-5'-P-CCNC: 20 U/L (ref 10–44)
ANION GAP SERPL CALC-SCNC: 11 MMOL/L (ref 8–16)
AST SERPL-CCNC: 23 U/L (ref 10–40)
BILIRUB SERPL-MCNC: 0.4 MG/DL (ref 0.1–1)
BUN SERPL-MCNC: 17 MG/DL (ref 8–23)
CALCIUM SERPL-MCNC: 10.5 MG/DL (ref 8.7–10.5)
CHLORIDE SERPL-SCNC: 102 MMOL/L (ref 95–110)
CHOLEST SERPL-MCNC: 154 MG/DL (ref 120–199)
CHOLEST/HDLC SERPL: 4.2 {RATIO} (ref 2–5)
CO2 SERPL-SCNC: 27 MMOL/L (ref 23–29)
CREAT SERPL-MCNC: 0.8 MG/DL (ref 0.5–1.4)
EST. GFR  (NO RACE VARIABLE): >60 ML/MIN/1.73 M^2
GLUCOSE SERPL-MCNC: 117 MG/DL (ref 70–110)
HDLC SERPL-MCNC: 37 MG/DL (ref 40–75)
HDLC SERPL: 24 % (ref 20–50)
LDLC SERPL CALC-MCNC: 100.2 MG/DL (ref 63–159)
NONHDLC SERPL-MCNC: 117 MG/DL
PLATELET RESPONSE PLAVIX: 190 PRU (ref 194–418)
POTASSIUM SERPL-SCNC: 4.8 MMOL/L (ref 3.5–5.1)
PROT SERPL-MCNC: 7.2 G/DL (ref 6–8.4)
SODIUM SERPL-SCNC: 140 MMOL/L (ref 136–145)
TRIGL SERPL-MCNC: 84 MG/DL (ref 30–150)

## 2023-05-04 PROCEDURE — 80061 LIPID PANEL: CPT | Performed by: INTERNAL MEDICINE

## 2023-05-04 PROCEDURE — 36415 COLL VENOUS BLD VENIPUNCTURE: CPT | Performed by: INTERNAL MEDICINE

## 2023-05-04 PROCEDURE — 85576 BLOOD PLATELET AGGREGATION: CPT | Performed by: INTERNAL MEDICINE

## 2023-05-04 PROCEDURE — 80053 COMPREHEN METABOLIC PANEL: CPT | Performed by: INTERNAL MEDICINE

## 2023-05-04 NOTE — TELEPHONE ENCOUNTER
Becky, this is Regine Shoemaker, clinical pharmacist with [facility] that is part of your care team.  We have begun working on your prescription that your doctor has sent us. Our next steps include:     Working with your insurance company to obtain approval for your medication  Working with you to ensure your medication is affordable     We will be calling you along the way with updates on your medication but if you have any concerns or receive information that you would like to discuss please reach us at [phone].    Welcome call outcome: Patient/caregiver reached

## 2023-05-04 NOTE — TELEPHONE ENCOUNTER
----- Message from Darren Hoko sent at 5/4/2023  9:51 AM CDT -----  Regarding: Reaction to medication  PT called top speak with the nurse about a possible reaction to medication.     Rx:  clopidogreL (PLAVIX) 75 mg tablet    PT can be reached @ [phone].      Thanks

## 2023-05-05 ENCOUNTER — CLINICAL SUPPORT (OUTPATIENT)
Dept: CARDIAC REHAB | Facility: CLINIC | Age: 63
End: 2023-05-05
Payer: COMMERCIAL

## 2023-05-05 DIAGNOSIS — Z98.61 POSTSURGICAL PERCUTANEOUS TRANSLUMINAL CORONARY ANGIOPLASTY STATUS: Primary | ICD-10-CM

## 2023-05-05 DIAGNOSIS — I25.10 ATHEROSCLEROSIS OF NATIVE CORONARY ARTERY OF NATIVE HEART WITHOUT ANGINA PECTORIS: ICD-10-CM

## 2023-05-05 PROCEDURE — 93798 PHYS/QHP OP CAR RHAB W/ECG: CPT | Mod: S$GLB,,, | Performed by: INTERNAL MEDICINE

## 2023-05-05 PROCEDURE — 93798 PR CARDIAC REHAB/MONITOR: ICD-10-PCS | Mod: S$GLB,,, | Performed by: INTERNAL MEDICINE

## 2023-05-08 NOTE — PROGRESS NOTES
Session: Exit   Cardiac Rehab Individual Treatment Plan - Discharge Assessment      Patient Name: Nai Boo MRN: [id number]   : 1960   Age: 62 y.o.   Primary Diagnosis: PTCA/STENT  Date of Event: 22  EF: 35-40%  Risk Stratification: low  Referring Physician: TOMASZ   Exercise Assessment:     CPX/TM: Entry Results Exit Results    Date: 23 Date: 23   RHR 64 57   Max  142   Peak VO2 (CPX only) 20.8 27.6   Actual METS (CPX only) 5.9 7.9   Estimated METS 13.0 15.0     Anthropometrics Entry Results Exit Results   Height 65 inches 65 inches   Weight 111 lbs 111 lbs   BMI 18.5  18.6   Abdominal Girth 28.5 28.0   Body Composition 15.5% 14.0%     Angina with exercise?: No   ST Depression with Exercise?: No  Currently exercising at home? yes Elliptical; Frequency: 5 to 6 days per week; Duration at least 30 minutes    Education:  Resistance Training; verbalizes understanding; Date: 4-10-23  Resistance Training II; verbalizes understanding; Date: 23  What Comes After Phase II?; verbalizes understanding; Date: 23    Rehab Exercise Goals:  Attend Cardiac Rehab 3 times/week: Met  Home Aerobic Exercise: 2 additional days/week for 30-60 minutes: Met  Intensity of 12-15 on the Rate of Perceived Exertion (RPE) scale: Met  30% increase in entry estimated METS: 16.9 : Not Met: 15.0    Comments: Nai attended rehab class regularly and had a significant improvement in aerobic capacity.      Exercise Discharge Plan:     Intervention/Recommendations:       Recommended Home Prescription:  THR Range: 108-129   Mode: Aerobic   Frequency: 5-6 times per week   Duration: 30-60 minutes each day   Other Recs: 3-5 minute warm up and cool down/stretches   Resistance Trainin-3 days per week on non-consecutive days       Comments:    I met with Nai for an exit interview from the Phase II cardiac rehab program.  The entry and exit stress testing results were discussed as well as current and future  exercise programs.     Patient's plan: Nai is planning to continue using her elliptical.  She is stretching as well but has not incorporated resistance training yet.     I reviewed exercise recommendations with Nai and encouraged her to continue exercising.  We discussed alternating using the elliptical with walking to add variety to her exercise program as well as to help prevent burnout.  I asked her call if she has any questions in the future.  She stated understanding.    Rigoberto Ruth., CEP

## 2023-05-08 NOTE — PROGRESS NOTES
HISTORY: S/P PTCA/STENT (12-25-22), CAD, ICM, HTN, HLP, HX OF STEMI, EF=35-40%(12-27-22)    ANTHROPOMETRICS:     PRE POST   Abdominal girth (in) 28.5 28.0   Height (in) 65 65   Weight (lbs) 111 111   BMI 18.5 18.6   % Body Fat 15.5% 14.0%     EXERCISE RESULTS:     PRE POST   Peak VO2 (CPX only) 20.8 27.6   Actual METS (CPX only) 5.9 7.9   Estimated METS 13.0 15.0       HOME PRESCRIPTION: Congratulations, Nai.  You completed Cardiac Rehab.  Aerobic exercise frequency is recommended 5 to 6 times per week with a duration of 30 to 60 minutes.  Intensity should keep you in your target heart range of 108 to 129 beats per minute.  Include a 3 to 5 minute warm up as well as cool down with stretches.  Resistance training is recommended 2 to 3 days per week on non-consecutive days.  Good luck to you.  Keep up the hard work, and it has been a pleasure working with you.      LAB RESULTS:    Lab Results   Component Value Date    HGB 15.2 04/26/2023    HGB 14.7 01/30/2023    HGB 14.7 01/22/2023     Lab Results   Component Value Date    HCT 46.7 04/26/2023    HCT 45.2 01/30/2023    HCT 44.5 01/22/2023     Lab Results   Component Value Date    MPV 10.6 04/26/2023    MPV 10.4 01/30/2023    MPV 10.7 01/22/2023       Lab Results   Component Value Date    CHOL 154 05/04/2023    CHOL 140 04/26/2023    CHOL 174 04/18/2023     Lab Results   Component Value Date    HDL 37 (L) 05/04/2023    HDL 37 (L) 04/26/2023    HDL 45 04/18/2023     Lab Results   Component Value Date    LDLCALC 100.2 05/04/2023    LDLCALC 92.4 04/26/2023    LDLCALC 113.6 04/18/2023     Lab Results   Component Value Date    TRIG 84 05/04/2023    TRIG 53 04/26/2023    TRIG 77 04/18/2023     Lab Results   Component Value Date    CHOLHDL 24.0 05/04/2023    CHOLHDL 26.4 04/26/2023    CHOLHDL 25.9 04/18/2023       Lab Results   Component Value Date    GLUF 94 04/26/2023    GLUF 87 01/30/2023     Lab Results   Component Value Date    HGBA1C 5.2 12/26/2022        Lab Results    Component Value Date    HSCRP 2.30 04/26/2023    HSCRP 1.77 01/30/2023         FAUSTO SCORES:     PRE POST   Anxiety 7 5   Depression 2 1   Somatic 7 4   Hostility 3 0     SF-36 SCORES:     PRE POST   Physical Function 25 28   Social Function 7 11   Mental Health 18 25   Pain 6 10   Change in Health 1 2   Physical Role Limitation 0 4   Mental Role Limitation 2 3   Energy/Fatigue 9 15   Health Perceptions 19 21   Total Score 87 119     PHQ-9:    PHQ-9 Depression Patient Health Questionnaire 2/2/2023 5/16/2023   Over the last two weeks how often have you been bothered by little interest or pleasure in doing things 1 0   Over the last two weeks how often have you been bothered by feeling down, depressed or hopeless 1 0   Over the last two weeks how often have you been bothered by trouble falling or staying asleep, or sleeping too much 2 2   Over the last two weeks how often have you been bothered by feeling tired or having little energy 2 1   Over the last two weeks how often have you been bothered by a poor appetite or overeating 1 0   Over the last two weeks how often have you been bothered by feeling bad about yourself - or that you are a failure or have let yourself or your family down 0 0   Over the last two weeks how often have you been bothered by trouble concentrating on things, such as reading the newspaper or watching television 1 1   Over the last two weeks how often have you been bothered by moving or speaking so slowly that other people could have noticed. 1 0   Over the last two weeks how often have you been bothered by thoughts that you would be better off dead, or of hurting yourself 0 0   If you checked off any problems, how difficult have these problems made it for you to do your work, take care of things at home or get along with other people? Very difficult Not difficult at all   Total Score 9 4                  EDUCATION SCORES:     PRE POST   Education Score 70 100

## 2023-05-12 ENCOUNTER — TELEPHONE (OUTPATIENT)
Dept: CARDIOLOGY | Facility: CLINIC | Age: 63
End: 2023-05-12
Payer: COMMERCIAL

## 2023-05-12 NOTE — TELEPHONE ENCOUNTER
I spoke to the specialty pharmacy today.  They are working on the appeal and will follow up next week.  Patient is aware of the appeal

## 2023-05-12 NOTE — TELEPHONE ENCOUNTER
----- Message from Darren Hook sent at 5/12/2023  9:01 AM CDT -----  Regarding: Medication denial  Please call pt in reference to medication denial.      Rx:  Binta      PT can be reached @ [phone]      Thanks

## 2023-05-15 ENCOUNTER — TELEPHONE (OUTPATIENT)
Dept: CARDIAC REHAB | Facility: CLINIC | Age: 63
End: 2023-05-15
Payer: COMMERCIAL

## 2023-05-16 ENCOUNTER — CLINICAL SUPPORT (OUTPATIENT)
Dept: CARDIAC REHAB | Facility: CLINIC | Age: 63
End: 2023-05-16
Payer: COMMERCIAL

## 2023-05-16 DIAGNOSIS — Z98.61 POSTSURGICAL PERCUTANEOUS TRANSLUMINAL CORONARY ANGIOPLASTY STATUS: Primary | ICD-10-CM

## 2023-05-16 DIAGNOSIS — I25.10 CORONARY ARTERY DISEASE, UNSPECIFIED VESSEL OR LESION TYPE, UNSPECIFIED WHETHER ANGINA PRESENT, UNSPECIFIED WHETHER NATIVE OR TRANSPLANTED HEART: ICD-10-CM

## 2023-05-16 PROCEDURE — 93798 PHYS/QHP OP CAR RHAB W/ECG: CPT | Mod: S$GLB,,, | Performed by: INTERNAL MEDICINE

## 2023-05-16 PROCEDURE — 99999 PR PBB SHADOW E&M-EST. PATIENT-LVL III: CPT | Mod: PBBFAC,,,

## 2023-05-16 PROCEDURE — 93798 PR CARDIAC REHAB/MONITOR: ICD-10-PCS | Mod: S$GLB,,, | Performed by: INTERNAL MEDICINE

## 2023-05-16 PROCEDURE — 99999 PR PBB SHADOW E&M-EST. PATIENT-LVL III: ICD-10-PCS | Mod: PBBFAC,,,

## 2023-05-16 NOTE — PROGRESS NOTES
Exit   Cardiac Rehab Individual Treatment Plan - Discharge Assessment      Patient Name: Nai Boo MRN: [id number]   : 1960   Age: 62 y.o.   Primary Diagnosis: s/p PTCA/stent, CAD, HTN, HLD, ICM, h/o STEMI    Nutrition Assessment:     Anthropometrics Pre Post   Height 65 inches 65 inches   Weight 111 lbs 111 lbs   BMI 18.5 18.5   Abdominal Girth 28.5 28   Body Composition 15.5 14       Labs:  Patient confirms she is taking nothing for cholesterol control.    Lab Results   Component Value Date    CHOL 154 2023    CHOL 140 2023    CHOL 174 2023     Lab Results   Component Value Date    HDL 37 (L) 2023    HDL 37 (L) 2023    HDL 45 2023     Lab Results   Component Value Date    LDLCALC 100.2 2023    LDLCALC 92.4 2023    LDLCALC 113.6 2023     Lab Results   Component Value Date    TRIG 84 2023    TRIG 53 2023    TRIG 77 2023     Lab Results   Component Value Date    CHOLHDL 24.0 2023    CHOLHDL 26.4 2023    CHOLHDL 25.9 2023       Lab Results   Component Value Date    GLUF 94 2023     Lab Results   Component Value Date    HGBA1C 5.2 2022       Nutrition/Diet History:  Patient eats 2-3 meals daily.    Seasons food with garlic/herb blend, salt subtitute.  Patient denies use of a salt shaker at the table on prepared foods.   Dines out 3 per week.  Chooses fried foods 1-2 time(s) per month.    Chooses fish 4-5 time(s) per week.   Beverages:  water and diet tea  Alcohol: social drinker  Current Exercise: See Exercise Physiologist Note  Food Safety/Food Preparation:  shares with   Living Arrangements/Family Support: Lives with spouse  Stage of Change Related to Diet Habits: Maintenance  Recent Changes to Diet: No  Food Diary: Completed      Nutrition Plan:   Goals:  2 gram sodium, Mediterranean diet: Met  Increase fish intake (non-fried varieties) to a goal of 2-3 servings per week: Met  Increase fruit  and vegetable intake: Met    Comments of Goal Progression:  Pt states she feels much better  with improved energy and is more conscientious of diet choices and sodium intake    Interventions/Recommendations:  Reviewed Anthropometric Progress  Reviewed Pre and Post Labs  Dietitian Consult: No  Nutrition Recommendations Provided: Verbal and Written, Reviewed  Discussed follow up plan for ongoing self-management support    Education:  Cholesterol and Fat; verbalizes understanding; Date: 4/12/23  Sodium; verbalizes understanding; Date: 5/3/23  Pre/Probiotics; verbalizes understanding; Date: 4/19/23    Comments:   Discussed ways to continue to incorporate healthy snacks, eating on a schedule, and monitoring sodium intake for heart health.      Diabetes  Is the patient diabetic? NO    Allison Barcenas MS, RDN/LDN

## 2023-05-16 NOTE — PROGRESS NOTES
Session: Exit   Cardiac Rehab Individual Treatment Plan - Discharge Assessment      Patient Name: Nai Boo MRN: [id number]   : 1960   Age: 62 y.o.   Diagnosis: S/P PTCA/Stent (22)    Psychosocial Assessment:   Outcome Survey Tools:    FAUSTO SCORES:   PRE POST   Anxiety 7 5   Depression 2 1   Somatic 7 4   Hostility 3 0     SF-36 SCORES:   PRE POST   Physical Function 25 28   Social Function 7 11   Mental Health 18 25   Pain 6 10   Change in Health 1 2   Physical Role Limitation 0 4   Mental Role Limitation 2 3   Energy/Fatigue 9 15   Health Perceptions 19 21   Total Score 87 119     PHQ 9:  PHQ-9 Depression Patient Health Questionnaire 2023   Over the last two weeks how often have you been bothered by little interest or pleasure in doing things 1 0   Over the last two weeks how often have you been bothered by feeling down, depressed or hopeless 1 0   Over the last two weeks how often have you been bothered by trouble falling or staying asleep, or sleeping too much 2 2   Over the last two weeks how often have you been bothered by feeling tired or having little energy 2 1   Over the last two weeks how often have you been bothered by a poor appetite or overeating 1 0   Over the last two weeks how often have you been bothered by feeling bad about yourself - or that you are a failure or have let yourself or your family down 0 0   Over the last two weeks how often have you been bothered by trouble concentrating on things, such as reading the newspaper or watching television 1 1   Over the last two weeks how often have you been bothered by moving or speaking so slowly that other people could have noticed. 1 0   Over the last two weeks how often have you been bothered by thoughts that you would be better off dead, or of hurting yourself 0 0   If you checked off any problems, how difficult have these problems made it for you to do your work, take care of things at home or get along  with other people? Very difficult Not difficult at all   Total Score 9 4                  Family Support: family and spouse  Self Reported: Effective Coping Skills  Displays: happiness and calmness    Psychosocial Plan:   Goals:  Improved psychosocial coping strategies: Met  Verbalizes coping mechanisms: Met  Reduce manifestation of anxiety: Met  Maintain positive support system: Met  Maintain positive outlook: Met  Improve overall quality of life: Met    Comments on Goal Progression:  Patient has made tremendous progress toward her goals and accomplishing it.    Interventions/Recommendations:  Discussed Results of Surveys: Yes  Notify MD: No  Program Referral: No  Pharmaceutical Intervention/Therapy: No  Physical Activity: Yes  Continue Patient Education: Yes  Other Needs: not applicable  Stage of Readiness to Change: Action    Education:  Relaxation Techniques; verbalizes understanding; Date: 2/13/23  Stress; verbalizes understanding; Date: 3/24/23  Risk Factor; verbalizes understanding; Date: 3/17/23    Comments:  Discussed screening tools and results. Scores are improved since starting. She reports she is in a better place and frame of mind. Patient has been instructed to seek attention via PCP/Psychiatry in the event that circumstances change. Patient verbalizes understanding.     Other Core Components/Risk Factors Assessment:   RISK FACTORS:  hyperlipidemia, positive family history, sedentary lifestyle, stress, anxiety    Learning Barriers: None    Education Level:  Attended College/Technical School    Pre-Test Score Post-Test Score   70 100     Medication Compliance: has been compliant with taking medications    Other Core Components/Risk Factors Plan:   Goals:  Decrease cholesterol level: Met  Increase exercise tolerance: Met  Increase knowledge of CAD: Met  Learn more about healthy eating: Met    Interventions/Recommendations:  Individual Education/Counseling: Yes  Physician Referral: Yes    Education:     blood pressure meds, verbalizes understanding; Date: 2/17/23  cholesterol, verbalizes understanding; Date: 4/12/23  cholesterol meds, verbalizes understanding; Date: 2/17/23  diabetes, verbalizes understanding; Date: 3/6/23  fluid overload/CHF, verbalizes understanding; Date: 4/14/23  hypertension, verbalizes understanding; Date: 4/28/23  nutrition, verbalizes understanding; Date: 3/1/23  physical activity, verbalizes understanding; Date: 4/3/23  risk factors, verbalizes understanding; Date: 3/17/23  sodium, verbalizes understanding; Date: 5/3/23  stress, verbalizes understanding; Date: 3/24/23         Comments:  Patient is exercising at home on her elliptical and continues to watch her diet.     Other Core Components/Hypertension Assessment:   BP Range: /50-76  Patient reported symptoms: none    Other Core Components/Hypertension Plan:   Goals:  Blood Pressure <130/80: Met    Interventions/Recommendations:  Med Card Reconciled: Yes  Encourage medication compliance  Encourage sodium reduction  Encourage weight loss  Recommend physical activity  Educate on contributory factors  Reduce stress, anxiety, anger, depression, and/or chronic pain  Encourage home blood pressure monitoring    Education:    Hypertension; verbalizes understanding; Date: 4/28/23  Congestive Heart Failure; verbalizes understanding; Date: 4/14/23  Risk Factors; verbalizes understanding; Date: 4/21/23  Medication I; verbalizes understanding; Date: 2/17/23  Medication II; verbalizes understanding; Date: 2/24/23  Stress; verbalizes understanding; Date: 3/24/23         Comments:  See monthly report in Epic for blood pressure trends  Information given to patient for [facility]: No  Patient is currently exercising at home on elliptical.     Does the patient have Heart Failure? No    Other Core Components/Tobacco Cessation Assessment:   Smoking Status: lifetime non-smoker  Smoking Cessation Barriers:  NA  Stage of Readiness  to Change: Maintenance    Other Core Components/Tobacco Cessation Plan:   Goals:  Maintain non-smoking status: Met    Interventions/Recommendations:  Maintains non-smoking status    Education:    Heart and Lung Anatomy; verbalizes understanding; Date: 3/31/23  Risk Factors; verbalizes understanding; Date: 4/21/23  Hypertension; verbalizes understanding; Date: 4/28/23  Medications I; verbalizes understanding; Date: 2/17/23  Medications II; verbalizes understanding; Date: 2/24/23         Comments:  Patient is motivated to continue her efforts with risk modifications and continue improved cardiac health. She reports she is in a better frame of mind these days and plans to continue. Patient has been instructed to follow up with Cardiologist for any further cardiac issues. Information on [facility] at [facility] given to patient.       Lenard Lozada RN  Cardiac Rehab Nurse

## 2023-05-23 ENCOUNTER — SPECIALTY PHARMACY (OUTPATIENT)
Dept: PHARMACY | Facility: CLINIC | Age: 63
End: 2023-05-23
Payer: COMMERCIAL

## 2023-05-23 NOTE — TELEPHONE ENCOUNTER
Referral complete. Staff message sent to [facility] to inform provider of [facility] denials and patient not being eligible for [facility] PAP.

## 2023-05-30 ENCOUNTER — PATIENT MESSAGE (OUTPATIENT)
Dept: CARDIOLOGY | Facility: CLINIC | Age: 63
End: 2023-05-30
Payer: COMMERCIAL

## 2023-05-30 DIAGNOSIS — E78.2 MIXED HYPERLIPIDEMIA: Primary | ICD-10-CM

## 2023-05-30 RX ORDER — ROSUVASTATIN CALCIUM 20 MG/1
20 TABLET, COATED ORAL DAILY
Qty: 90 TABLET | Refills: 3 | Status: SHIPPED | OUTPATIENT
Start: 2023-05-30 | End: 2023-07-13

## 2023-05-31 ENCOUNTER — LAB VISIT (OUTPATIENT)
Dept: LAB | Facility: HOSPITAL | Age: 63
End: 2023-05-31
Attending: INTERNAL MEDICINE
Payer: COMMERCIAL

## 2023-05-31 DIAGNOSIS — E78.2 MIXED HYPERLIPIDEMIA: ICD-10-CM

## 2023-05-31 LAB
ALBUMIN SERPL BCP-MCNC: 4.1 G/DL (ref 3.5–5.2)
ALP SERPL-CCNC: 75 U/L (ref 55–135)
ALT SERPL W/O P-5'-P-CCNC: 25 U/L (ref 10–44)
ANION GAP SERPL CALC-SCNC: 7 MMOL/L (ref 8–16)
AST SERPL-CCNC: 26 U/L (ref 10–40)
BILIRUB SERPL-MCNC: 0.8 MG/DL (ref 0.1–1)
BUN SERPL-MCNC: 15 MG/DL (ref 8–23)
CALCIUM SERPL-MCNC: 9.9 MG/DL (ref 8.7–10.5)
CHLORIDE SERPL-SCNC: 103 MMOL/L (ref 95–110)
CHOLEST SERPL-MCNC: 159 MG/DL (ref 120–199)
CHOLEST/HDLC SERPL: 4.1 {RATIO} (ref 2–5)
CO2 SERPL-SCNC: 30 MMOL/L (ref 23–29)
CREAT SERPL-MCNC: 0.9 MG/DL (ref 0.5–1.4)
EST. GFR  (NO RACE VARIABLE): >60 ML/MIN/1.73 M^2
GLUCOSE SERPL-MCNC: 98 MG/DL (ref 70–110)
HDLC SERPL-MCNC: 39 MG/DL (ref 40–75)
HDLC SERPL: 24.5 % (ref 20–50)
LDLC SERPL CALC-MCNC: 100.8 MG/DL (ref 63–159)
NONHDLC SERPL-MCNC: 120 MG/DL
POTASSIUM SERPL-SCNC: 4.6 MMOL/L (ref 3.5–5.1)
PROT SERPL-MCNC: 7 G/DL (ref 6–8.4)
SODIUM SERPL-SCNC: 140 MMOL/L (ref 136–145)
TRIGL SERPL-MCNC: 96 MG/DL (ref 30–150)

## 2023-05-31 PROCEDURE — 80061 LIPID PANEL: CPT | Performed by: INTERNAL MEDICINE

## 2023-05-31 PROCEDURE — 80053 COMPREHEN METABOLIC PANEL: CPT | Performed by: INTERNAL MEDICINE

## 2023-05-31 PROCEDURE — 36415 COLL VENOUS BLD VENIPUNCTURE: CPT | Performed by: INTERNAL MEDICINE

## 2023-07-12 ENCOUNTER — PATIENT MESSAGE (OUTPATIENT)
Dept: CARDIOLOGY | Facility: CLINIC | Age: 63
End: 2023-07-12
Payer: COMMERCIAL

## 2023-07-12 ENCOUNTER — LAB VISIT (OUTPATIENT)
Dept: LAB | Facility: HOSPITAL | Age: 63
End: 2023-07-12
Attending: INTERNAL MEDICINE
Payer: COMMERCIAL

## 2023-07-12 DIAGNOSIS — E78.2 MIXED HYPERLIPIDEMIA: ICD-10-CM

## 2023-07-12 LAB
ALBUMIN SERPL BCP-MCNC: 4.1 G/DL (ref 3.5–5.2)
ALP SERPL-CCNC: 113 U/L (ref 55–135)
ALT SERPL W/O P-5'-P-CCNC: 15 U/L (ref 10–44)
ANION GAP SERPL CALC-SCNC: 6 MMOL/L (ref 8–16)
AST SERPL-CCNC: 21 U/L (ref 10–40)
BILIRUB SERPL-MCNC: 0.8 MG/DL (ref 0.1–1)
BUN SERPL-MCNC: 16 MG/DL (ref 8–23)
CALCIUM SERPL-MCNC: 9.8 MG/DL (ref 8.7–10.5)
CHLORIDE SERPL-SCNC: 104 MMOL/L (ref 95–110)
CHOLEST SERPL-MCNC: 121 MG/DL (ref 120–199)
CHOLEST/HDLC SERPL: 3 {RATIO} (ref 2–5)
CO2 SERPL-SCNC: 30 MMOL/L (ref 23–29)
CREAT SERPL-MCNC: 0.8 MG/DL (ref 0.5–1.4)
EST. GFR  (NO RACE VARIABLE): >60 ML/MIN/1.73 M^2
GLUCOSE SERPL-MCNC: 97 MG/DL (ref 70–110)
HDLC SERPL-MCNC: 41 MG/DL (ref 40–75)
HDLC SERPL: 33.9 % (ref 20–50)
LDLC SERPL CALC-MCNC: 69.2 MG/DL (ref 63–159)
NONHDLC SERPL-MCNC: 80 MG/DL
POTASSIUM SERPL-SCNC: 4.4 MMOL/L (ref 3.5–5.1)
PROT SERPL-MCNC: 7 G/DL (ref 6–8.4)
SODIUM SERPL-SCNC: 140 MMOL/L (ref 136–145)
TRIGL SERPL-MCNC: 54 MG/DL (ref 30–150)

## 2023-07-12 PROCEDURE — 80053 COMPREHEN METABOLIC PANEL: CPT | Performed by: INTERNAL MEDICINE

## 2023-07-12 PROCEDURE — 80061 LIPID PANEL: CPT | Performed by: INTERNAL MEDICINE

## 2023-07-12 PROCEDURE — 36415 COLL VENOUS BLD VENIPUNCTURE: CPT | Performed by: INTERNAL MEDICINE

## 2023-07-13 DIAGNOSIS — E78.2 MIXED HYPERLIPIDEMIA: ICD-10-CM

## 2023-07-13 RX ORDER — ROSUVASTATIN CALCIUM 40 MG/1
40 TABLET, COATED ORAL DAILY
Qty: 30 TABLET | Refills: 11 | Status: SHIPPED | OUTPATIENT
Start: 2023-07-13 | End: 2024-07-12

## 2023-08-18 ENCOUNTER — LAB VISIT (OUTPATIENT)
Dept: LAB | Facility: HOSPITAL | Age: 63
End: 2023-08-18
Attending: INTERNAL MEDICINE
Payer: COMMERCIAL

## 2023-08-18 DIAGNOSIS — E78.2 MIXED HYPERLIPIDEMIA: ICD-10-CM

## 2023-08-18 LAB
ALBUMIN SERPL BCP-MCNC: 4.1 G/DL (ref 3.5–5.2)
ALP SERPL-CCNC: 81 U/L (ref 55–135)
ALT SERPL W/O P-5'-P-CCNC: 26 U/L (ref 10–44)
ANION GAP SERPL CALC-SCNC: 6 MMOL/L (ref 8–16)
AST SERPL-CCNC: 30 U/L (ref 10–40)
BILIRUB SERPL-MCNC: 0.7 MG/DL (ref 0.1–1)
BUN SERPL-MCNC: 15 MG/DL (ref 8–23)
CALCIUM SERPL-MCNC: 9.8 MG/DL (ref 8.7–10.5)
CHLORIDE SERPL-SCNC: 105 MMOL/L (ref 95–110)
CHOLEST SERPL-MCNC: 109 MG/DL (ref 120–199)
CHOLEST/HDLC SERPL: 2.7 {RATIO} (ref 2–5)
CO2 SERPL-SCNC: 29 MMOL/L (ref 23–29)
CREAT SERPL-MCNC: 0.9 MG/DL (ref 0.5–1.4)
EST. GFR  (NO RACE VARIABLE): >60 ML/MIN/1.73 M^2
GLUCOSE SERPL-MCNC: 90 MG/DL (ref 70–110)
HDLC SERPL-MCNC: 40 MG/DL (ref 40–75)
HDLC SERPL: 36.7 % (ref 20–50)
LDLC SERPL CALC-MCNC: 58 MG/DL (ref 63–159)
NONHDLC SERPL-MCNC: 69 MG/DL
POTASSIUM SERPL-SCNC: 4.3 MMOL/L (ref 3.5–5.1)
PROT SERPL-MCNC: 7 G/DL (ref 6–8.4)
SODIUM SERPL-SCNC: 140 MMOL/L (ref 136–145)
TRIGL SERPL-MCNC: 55 MG/DL (ref 30–150)

## 2023-08-18 PROCEDURE — 80053 COMPREHEN METABOLIC PANEL: CPT | Performed by: INTERNAL MEDICINE

## 2023-08-18 PROCEDURE — 36415 COLL VENOUS BLD VENIPUNCTURE: CPT | Performed by: INTERNAL MEDICINE

## 2023-08-18 PROCEDURE — 80061 LIPID PANEL: CPT | Performed by: INTERNAL MEDICINE

## 2023-10-19 DIAGNOSIS — I21.3 STEMI (ST ELEVATION MYOCARDIAL INFARCTION): ICD-10-CM

## 2023-10-19 PROBLEM — E78.5 HYPERLIPIDEMIA: Status: ACTIVE | Noted: 2022-12-26

## 2023-10-19 RX ORDER — METOPROLOL SUCCINATE 50 MG/1
50 TABLET, EXTENDED RELEASE ORAL DAILY
Qty: 30 TABLET | Refills: 11 | Status: SHIPPED | OUTPATIENT
Start: 2023-10-19 | End: 2024-10-18

## 2023-10-20 ENCOUNTER — OFFICE VISIT (OUTPATIENT)
Dept: CARDIOLOGY | Facility: CLINIC | Age: 63
End: 2023-10-20
Payer: COMMERCIAL

## 2023-10-20 ENCOUNTER — LAB VISIT (OUTPATIENT)
Dept: LAB | Facility: HOSPITAL | Age: 63
End: 2023-10-20
Attending: INTERNAL MEDICINE
Payer: COMMERCIAL

## 2023-10-20 VITALS
WEIGHT: 109.81 LBS | DIASTOLIC BLOOD PRESSURE: 61 MMHG | HEIGHT: 65 IN | SYSTOLIC BLOOD PRESSURE: 112 MMHG | OXYGEN SATURATION: 98 % | BODY MASS INDEX: 18.3 KG/M2 | HEART RATE: 66 BPM

## 2023-10-20 DIAGNOSIS — I25.2 HISTORY OF ST ELEVATION MYOCARDIAL INFARCTION (STEMI): ICD-10-CM

## 2023-10-20 DIAGNOSIS — I73.00 RAYNAUD'S PHENOMENON WITHOUT GANGRENE: ICD-10-CM

## 2023-10-20 DIAGNOSIS — I21.3 STEMI (ST ELEVATION MYOCARDIAL INFARCTION): ICD-10-CM

## 2023-10-20 DIAGNOSIS — E78.5 HYPERLIPIDEMIA, UNSPECIFIED HYPERLIPIDEMIA TYPE: ICD-10-CM

## 2023-10-20 DIAGNOSIS — I10 ESSENTIAL HYPERTENSION: ICD-10-CM

## 2023-10-20 DIAGNOSIS — E78.5 HYPERLIPIDEMIA, UNSPECIFIED HYPERLIPIDEMIA TYPE: Primary | ICD-10-CM

## 2023-10-20 DIAGNOSIS — I25.10 ATHEROSCLEROSIS OF NATIVE CORONARY ARTERY OF NATIVE HEART WITHOUT ANGINA PECTORIS: ICD-10-CM

## 2023-10-20 LAB
ALBUMIN SERPL BCP-MCNC: 4.1 G/DL (ref 3.5–5.2)
ALP SERPL-CCNC: 81 U/L (ref 55–135)
ALT SERPL W/O P-5'-P-CCNC: 25 U/L (ref 10–44)
ANION GAP SERPL CALC-SCNC: 10 MMOL/L (ref 8–16)
AST SERPL-CCNC: 29 U/L (ref 10–40)
BILIRUB SERPL-MCNC: 0.6 MG/DL (ref 0.1–1)
BUN SERPL-MCNC: 17 MG/DL (ref 8–23)
CALCIUM SERPL-MCNC: 10 MG/DL (ref 8.7–10.5)
CHLORIDE SERPL-SCNC: 103 MMOL/L (ref 95–110)
CO2 SERPL-SCNC: 27 MMOL/L (ref 23–29)
CREAT SERPL-MCNC: 1 MG/DL (ref 0.5–1.4)
EST. GFR  (NO RACE VARIABLE): >60 ML/MIN/1.73 M^2
GLUCOSE SERPL-MCNC: 84 MG/DL (ref 70–110)
POTASSIUM SERPL-SCNC: 3.9 MMOL/L (ref 3.5–5.1)
PROT SERPL-MCNC: 6.9 G/DL (ref 6–8.4)
SODIUM SERPL-SCNC: 140 MMOL/L (ref 136–145)

## 2023-10-20 PROCEDURE — 3008F PR BODY MASS INDEX (BMI) DOCUMENTED: ICD-10-PCS | Mod: CPTII,S$GLB,, | Performed by: INTERNAL MEDICINE

## 2023-10-20 PROCEDURE — 4010F ACE/ARB THERAPY RXD/TAKEN: CPT | Mod: CPTII,S$GLB,, | Performed by: INTERNAL MEDICINE

## 2023-10-20 PROCEDURE — 99214 PR OFFICE/OUTPT VISIT, EST, LEVL IV, 30-39 MIN: ICD-10-PCS | Mod: S$GLB,,, | Performed by: INTERNAL MEDICINE

## 2023-10-20 PROCEDURE — 1159F MED LIST DOCD IN RCRD: CPT | Mod: CPTII,S$GLB,, | Performed by: INTERNAL MEDICINE

## 2023-10-20 PROCEDURE — 80053 COMPREHEN METABOLIC PANEL: CPT | Performed by: INTERNAL MEDICINE

## 2023-10-20 PROCEDURE — 99999 PR PBB SHADOW E&M-EST. PATIENT-LVL IV: CPT | Mod: PBBFAC,,, | Performed by: INTERNAL MEDICINE

## 2023-10-20 PROCEDURE — 4010F PR ACE/ARB THEARPY RXD/TAKEN: ICD-10-PCS | Mod: CPTII,S$GLB,, | Performed by: INTERNAL MEDICINE

## 2023-10-20 PROCEDURE — 1159F PR MEDICATION LIST DOCUMENTED IN MEDICAL RECORD: ICD-10-PCS | Mod: CPTII,S$GLB,, | Performed by: INTERNAL MEDICINE

## 2023-10-20 PROCEDURE — 1160F PR REVIEW ALL MEDS BY PRESCRIBER/CLIN PHARMACIST DOCUMENTED: ICD-10-PCS | Mod: CPTII,S$GLB,, | Performed by: INTERNAL MEDICINE

## 2023-10-20 PROCEDURE — 99214 OFFICE O/P EST MOD 30 MIN: CPT | Mod: S$GLB,,, | Performed by: INTERNAL MEDICINE

## 2023-10-20 PROCEDURE — 3074F SYST BP LT 130 MM HG: CPT | Mod: CPTII,S$GLB,, | Performed by: INTERNAL MEDICINE

## 2023-10-20 PROCEDURE — 36415 COLL VENOUS BLD VENIPUNCTURE: CPT | Performed by: INTERNAL MEDICINE

## 2023-10-20 PROCEDURE — 99999 PR PBB SHADOW E&M-EST. PATIENT-LVL IV: ICD-10-PCS | Mod: PBBFAC,,, | Performed by: INTERNAL MEDICINE

## 2023-10-20 PROCEDURE — 3078F DIAST BP <80 MM HG: CPT | Mod: CPTII,S$GLB,, | Performed by: INTERNAL MEDICINE

## 2023-10-20 PROCEDURE — 3078F PR MOST RECENT DIASTOLIC BLOOD PRESSURE < 80 MM HG: ICD-10-PCS | Mod: CPTII,S$GLB,, | Performed by: INTERNAL MEDICINE

## 2023-10-20 PROCEDURE — 3074F PR MOST RECENT SYSTOLIC BLOOD PRESSURE < 130 MM HG: ICD-10-PCS | Mod: CPTII,S$GLB,, | Performed by: INTERNAL MEDICINE

## 2023-10-20 PROCEDURE — 1160F RVW MEDS BY RX/DR IN RCRD: CPT | Mod: CPTII,S$GLB,, | Performed by: INTERNAL MEDICINE

## 2023-10-20 PROCEDURE — 3008F BODY MASS INDEX DOCD: CPT | Mod: CPTII,S$GLB,, | Performed by: INTERNAL MEDICINE

## 2023-10-20 RX ORDER — ASPIRIN 81 MG/1
81 TABLET ORAL DAILY
Qty: 30 TABLET | Refills: 11 | Status: SHIPPED | OUTPATIENT
Start: 2023-10-20 | End: 2024-10-19

## 2023-10-20 NOTE — PROGRESS NOTES
Chart has been dictated using voice recognition software.  It is not been reviewed carefully for any transcriptional errors due to this technology.   Subjective:   Patient ID:  Nai Boo is a 63 y.o. female who presents for follow-up of Follow-up      HPI: Patient with hypertension admitted 25-28-Dec-2023 with RCA STEMI.  Patient had emmergency PCI of RCA which was a large dominant vessel extending to the lateral wall.  Patient needed an IABP because of poor flow following the stent placement.  Patient successfully weaned the following day and discharged 3 days later.  EF following PCI showed an EF of 35-40% but improved to 60% on an echocardiogram 3 months later with no wall motion abnormalities.    Patient denies any chest discomfort on exertion or at rest.  Patient denies any dyspnea at rest or on exertion, orthopnea, PND, or edema.  Patient denies any palpitations or syncope. Has occasional orthostatic lightheadedness.      Past Medical History:   Diagnosis Date    Hypertension     Trigger finger        Outpatient Medications Prior to Visit   Medication Sig Dispense Refill    clopidogreL (PLAVIX) 75 mg tablet Take 1 tablet (75 mg total) by mouth once daily. 30 tablet 11    levothyroxine (SYNTHROID) 50 MCG tablet Take 50 mcg by mouth before breakfast.      metoprolol succinate (TOPROL-XL) 50 MG 24 hr tablet Take 1 tablet (50 mg total) by mouth once daily. 30 tablet 11    metoprolol succinate 50 mg CSpX 1 capsule.      multivitamin capsule Take 1 capsule by mouth once daily.      rosuvastatin (CRESTOR) 40 MG Tab Take 1 tablet (40 mg total) by mouth once daily. 30 tablet 11    spironolactone (ALDACTONE) 25 MG tablet Take 1 tablet (25 mg total) by mouth once daily. 30 tablet 11    valsartan (DIOVAN) 40 MG tablet Take 1 tablet (40 mg total) by mouth once daily. 90 tablet 3    aspirin (ASPIR-81 ORAL) Aspir-81      aspirin (ECOTRIN) 81 MG EC tablet Take 1 tablet (81 mg total) by mouth once daily. 30  "tablet 11    carboxymethylcellulose sodium (THERATEARS OPHT) Apply to eye.      evolocumab (REPATHA SURECLICK) 140 mg/mL PnIj Inject 1 mL (140 mg total) into the skin every 14 (fourteen) days. 6 each 3     No facility-administered medications prior to visit.       Review of Systems   HENT:  Positive for nosebleeds.    Hematologic/Lymphatic: Negative for bleeding problem. Bruises/bleeds easily.      Objective:   Physical Exam  Constitutional:       Appearance: She is well-developed.      Comments: /61   Pulse 66   Ht 5' 5" (1.651 m)   Wt 49.8 kg (109 lb 12.6 oz)   LMP 03/11/2021   SpO2 98%   BMI 18.27 kg/m²    Neck:      Thyroid: No thyromegaly.      Vascular: No carotid bruit or JVD.   Cardiovascular:      Rate and Rhythm: Normal rate and regular rhythm.      Pulses: Intact distal pulses.      Heart sounds: S1 normal and S2 normal. No murmur heard.     No friction rub. Gallop present. S4 sounds present.   Pulmonary:      Effort: Pulmonary effort is normal.      Breath sounds: Normal breath sounds. No wheezing or rales.   Abdominal:      General: Bowel sounds are normal.      Palpations: Abdomen is soft.      Tenderness: There is no abdominal tenderness.   Musculoskeletal:      Cervical back: Neck supple.      Right lower leg: No edema.      Left lower leg: No edema.   Skin:     Nails: There is no clubbing.   Neurological:      Mental Status: She is alert and oriented to person, place, and time.           Lab Results   Component Value Date     10/20/2023    K 3.9 10/20/2023    BUN 17 10/20/2023    CREATININE 1.0 10/20/2023    GLU 84 10/20/2023    HGBA1C 5.2 12/26/2022     (H) 01/22/2023    CHOL 109 (L) 08/18/2023    HDL 40 08/18/2023    LDLCALC 58.0 (L) 08/18/2023    TRIG 55 08/18/2023    CHOLHDL 36.7 08/18/2023    HGB 15.2 04/26/2023    HCT 46.7 04/26/2023     (L) 04/26/2023       Assessment:     1. Hyperlipidemia, unspecified hyperlipidemia type    2. STEMI (ST elevation myocardial " infarction)    3. History of ST elevation myocardial infarction (STEMI)    4. Atherosclerosis of native coronary artery of native heart without angina pectoris    5. Essential hypertension    6. Raynaud's phenomenon without gangrene      Patient has no symptoms of cardiac ischemia, heart failure, or significant arrhythmias.  Her LDL is well controlled.  Her blood pressure is well controlled.  Patient is concerned about possible effect of statins on her liver so a CMP was obtained and all results were within normal limits.  Therefore, the patient should continue on her current lipid lowering regimen.  Additionally, numbness her other medications will be changed at this time.    Unless there are intervening problems, patient should return for re-evaluation in 1 year.       Plan:     Nai was seen today for follow-up.    Diagnoses and all orders for this visit:    Hyperlipidemia, unspecified hyperlipidemia type  -     Comprehensive Metabolic Panel; Future; Expected date: 10/20/2023    STEMI (ST elevation myocardial infarction)  -     aspirin (ECOTRIN) 81 MG EC tablet; Take 1 tablet (81 mg total) by mouth once daily.    History of ST elevation myocardial infarction (STEMI)    Atherosclerosis of native coronary artery of native heart without angina pectoris    Essential hypertension    Raynaud's phenomenon without gangrene    Other orders  -     metoprolol succinate 50 mg CSpX; 1 capsule.  -     Discontinue: aspirin (ASPIR-81 ORAL); Aspir-81          Agustin Helms MD  Consultative Cardiology

## 2023-12-20 DIAGNOSIS — I50.20 HFREF (HEART FAILURE WITH REDUCED EJECTION FRACTION): ICD-10-CM

## 2023-12-21 RX ORDER — SPIRONOLACTONE 25 MG/1
25 TABLET ORAL
Qty: 30 TABLET | Refills: 11 | Status: SHIPPED | OUTPATIENT
Start: 2023-12-21

## 2023-12-21 RX ORDER — VALSARTAN 40 MG/1
40 TABLET ORAL
Qty: 90 TABLET | Refills: 3 | Status: SHIPPED | OUTPATIENT
Start: 2023-12-21

## 2024-02-15 ENCOUNTER — PATIENT MESSAGE (OUTPATIENT)
Dept: CARDIOLOGY | Facility: CLINIC | Age: 64
End: 2024-02-15
Payer: COMMERCIAL

## 2024-02-23 DIAGNOSIS — I25.2 HISTORY OF ST ELEVATION MYOCARDIAL INFARCTION (STEMI): Primary | ICD-10-CM

## 2024-02-28 ENCOUNTER — LAB VISIT (OUTPATIENT)
Dept: LAB | Facility: HOSPITAL | Age: 64
End: 2024-02-28
Attending: INTERNAL MEDICINE
Payer: COMMERCIAL

## 2024-02-28 DIAGNOSIS — I25.2 HISTORY OF ST ELEVATION MYOCARDIAL INFARCTION (STEMI): ICD-10-CM

## 2024-02-28 LAB — PLATELET RESPONSE PLAVIX: 168 PRU (ref 194–418)

## 2024-02-28 PROCEDURE — 85576 BLOOD PLATELET AGGREGATION: CPT | Performed by: INTERNAL MEDICINE

## 2024-02-28 PROCEDURE — 36415 COLL VENOUS BLD VENIPUNCTURE: CPT | Performed by: INTERNAL MEDICINE

## 2024-04-04 RX ORDER — CLOPIDOGREL BISULFATE 75 MG/1
75 TABLET ORAL
Qty: 30 TABLET | Refills: 11 | Status: SHIPPED | OUTPATIENT
Start: 2024-04-04

## 2024-07-22 DIAGNOSIS — E78.2 MIXED HYPERLIPIDEMIA: ICD-10-CM

## 2024-07-22 RX ORDER — ROSUVASTATIN CALCIUM 40 MG/1
40 TABLET, COATED ORAL
Qty: 30 TABLET | Refills: 11 | Status: SHIPPED | OUTPATIENT
Start: 2024-07-22

## 2024-09-10 DIAGNOSIS — I50.20 HFREF (HEART FAILURE WITH REDUCED EJECTION FRACTION): ICD-10-CM

## 2024-09-10 RX ORDER — VALSARTAN 40 MG/1
40 TABLET ORAL
Qty: 90 TABLET | Refills: 3 | Status: SHIPPED | OUTPATIENT
Start: 2024-09-10

## 2024-09-12 RX ORDER — VALSARTAN 40 MG/1
40 TABLET ORAL
Qty: 90 TABLET | Refills: 3 | OUTPATIENT
Start: 2024-09-12

## 2024-09-13 ENCOUNTER — TELEPHONE (OUTPATIENT)
Dept: CARDIOLOGY | Facility: CLINIC | Age: 64
End: 2024-09-13
Payer: COMMERCIAL

## 2024-09-13 NOTE — TELEPHONE ENCOUNTER
Contacted patient regarding book out 10/11. Contacted patient to let her know the appointment will be changed due to bookout.

## 2024-10-04 ENCOUNTER — PATIENT MESSAGE (OUTPATIENT)
Dept: CARDIOLOGY | Facility: CLINIC | Age: 64
End: 2024-10-04
Payer: COMMERCIAL

## 2024-10-10 DIAGNOSIS — I21.3 STEMI (ST ELEVATION MYOCARDIAL INFARCTION): ICD-10-CM

## 2024-10-11 RX ORDER — METOPROLOL SUCCINATE 50 MG/1
50 TABLET, EXTENDED RELEASE ORAL
Qty: 30 TABLET | Refills: 11 | Status: SHIPPED | OUTPATIENT
Start: 2024-10-11

## 2024-10-12 DIAGNOSIS — I21.3 STEMI (ST ELEVATION MYOCARDIAL INFARCTION): ICD-10-CM

## 2024-10-14 RX ORDER — ASPIRIN 81 MG/1
81 TABLET ORAL
Qty: 30 TABLET | Refills: 11 | Status: SHIPPED | OUTPATIENT
Start: 2024-10-14

## 2024-10-20 PROBLEM — Z95.5 S/P PRIMARY ANGIOPLASTY WITH CORONARY STENT: Status: ACTIVE | Noted: 2024-10-20

## 2024-10-21 ENCOUNTER — OFFICE VISIT (OUTPATIENT)
Dept: CARDIOLOGY | Facility: CLINIC | Age: 64
End: 2024-10-21
Payer: COMMERCIAL

## 2024-10-21 VITALS
HEIGHT: 64 IN | DIASTOLIC BLOOD PRESSURE: 65 MMHG | BODY MASS INDEX: 19.79 KG/M2 | HEART RATE: 65 BPM | OXYGEN SATURATION: 100 % | SYSTOLIC BLOOD PRESSURE: 120 MMHG | WEIGHT: 115.94 LBS

## 2024-10-21 DIAGNOSIS — R01.1 HEART MURMUR, SYSTOLIC: ICD-10-CM

## 2024-10-21 DIAGNOSIS — Z95.5 S/P PRIMARY ANGIOPLASTY WITH CORONARY STENT: ICD-10-CM

## 2024-10-21 DIAGNOSIS — I10 ESSENTIAL HYPERTENSION: ICD-10-CM

## 2024-10-21 DIAGNOSIS — E78.5 HYPERLIPIDEMIA, UNSPECIFIED HYPERLIPIDEMIA TYPE: ICD-10-CM

## 2024-10-21 DIAGNOSIS — I25.10 ATHEROSCLEROSIS OF NATIVE CORONARY ARTERY OF NATIVE HEART WITHOUT ANGINA PECTORIS: ICD-10-CM

## 2024-10-21 DIAGNOSIS — I73.00 RAYNAUD'S PHENOMENON WITHOUT GANGRENE: ICD-10-CM

## 2024-10-21 DIAGNOSIS — I25.2 HISTORY OF ST ELEVATION MYOCARDIAL INFARCTION (STEMI): Primary | ICD-10-CM

## 2024-10-21 PROCEDURE — 3074F SYST BP LT 130 MM HG: CPT | Mod: CPTII,S$GLB,, | Performed by: INTERNAL MEDICINE

## 2024-10-21 PROCEDURE — 1160F RVW MEDS BY RX/DR IN RCRD: CPT | Mod: CPTII,S$GLB,, | Performed by: INTERNAL MEDICINE

## 2024-10-21 PROCEDURE — 99214 OFFICE O/P EST MOD 30 MIN: CPT | Mod: S$GLB,,, | Performed by: INTERNAL MEDICINE

## 2024-10-21 PROCEDURE — 3008F BODY MASS INDEX DOCD: CPT | Mod: CPTII,S$GLB,, | Performed by: INTERNAL MEDICINE

## 2024-10-21 PROCEDURE — 99999 PR PBB SHADOW E&M-EST. PATIENT-LVL IV: CPT | Mod: PBBFAC,,, | Performed by: INTERNAL MEDICINE

## 2024-10-21 PROCEDURE — 1159F MED LIST DOCD IN RCRD: CPT | Mod: CPTII,S$GLB,, | Performed by: INTERNAL MEDICINE

## 2024-10-21 PROCEDURE — 4010F ACE/ARB THERAPY RXD/TAKEN: CPT | Mod: CPTII,S$GLB,, | Performed by: INTERNAL MEDICINE

## 2024-10-21 PROCEDURE — 3078F DIAST BP <80 MM HG: CPT | Mod: CPTII,S$GLB,, | Performed by: INTERNAL MEDICINE

## 2024-10-21 NOTE — PROGRESS NOTES
Chart has been dictated using voice recognition software.  It is not been reviewed carefully for any transcriptional errors due to this technology.   Subjective:   Patient ID:  Nai Boo is a 64 y.o. female who presents for follow-up of No chief complaint on file.      HPI:  Patient with hypertension admitted 25-28-Dec-2023 with RCA STEMI.  Patient had emergency PCI of RCA which was a large dominant vessel extending to the lateral wall.  Patient needed an IABP because of poor flow following the stent placement.  Patient successfully weaned the following day and discharged 3 days later.  EF following PCI showed an EF of 35-40% but improved to 60% on an echocardiogram 3 months later with no wall motion abnormalities.     Patient not have a lot of energy although she is active.  After standing for 10 min she gets pain in the middle of her back without dyspnea.  Also gets pain in her hips with walking about 15 min. Patient denies any chest discomfort on exertion or at rest.  Patient denies any dyspnea at rest or on exertion, orthopnea, PND, or edema.  Patient denies any palpitations, or syncope. Will get occasional orthostatic lightheadedness,      Past Medical History:   Diagnosis Date    Hypertension     Trigger finger        Outpatient Medications Prior to Visit   Medication Sig Dispense Refill    aspirin (ECOTRIN) 81 MG EC tablet TAKE 1 TABLET BY MOUTH ONCE DAILY 30 tablet 11    carboxymethylcellulose sodium (THERATEARS OPHT) Apply to eye.      clopidogreL (PLAVIX) 75 mg tablet TAKE 1 TABLET(75 MG) BY MOUTH EVERY DAY 30 tablet 11    evolocumab (REPATHA SURECLICK) 140 mg/mL PnIj Inject 1 mL (140 mg total) into the skin every 14 (fourteen) days. 6 each 3    levothyroxine (SYNTHROID) 50 MCG tablet Take 50 mcg by mouth before breakfast.      metoprolol succinate (TOPROL-XL) 50 MG 24 hr tablet TAKE 1 TABLET(50 MG) BY MOUTH EVERY DAY 30 tablet 11    metoprolol succinate 50 mg CSpX 1 capsule.       "multivitamin capsule Take 1 capsule by mouth once daily.      rosuvastatin (CRESTOR) 40 MG Tab TAKE 1 TABLET(40 MG) BY MOUTH EVERY DAY 30 tablet 11    spironolactone (ALDACTONE) 25 MG tablet TAKE 1 TABLET BY MOUTH ONCE DAILY 30 tablet 11    valsartan (DIOVAN) 40 MG tablet TAKE 1 TABLET BY MOUTH EVERY DAY 90 tablet 3     No facility-administered medications prior to visit.       Review of Systems   Constitutional: Negative for weight gain and weight loss.   HENT:  Negative for nosebleeds.    Respiratory:  Negative for hemoptysis.    Hematologic/Lymphatic: Negative for bleeding problem. Does not bruise/bleed easily.   Musculoskeletal:  Positive for back pain and joint pain (hips).   Gastrointestinal:  Negative for hematemesis and hematochezia.   Genitourinary:  Negative for hematuria.   Neurological:  Positive for weakness. Negative for focal weakness, loss of balance and numbness.      Objective:   Physical Exam  Constitutional:       Appearance: She is well-developed.      Comments: /65 (Patient Position: Sitting)   Pulse 65   Ht 5' 4" (1.626 m)   Wt 52.6 kg (115 lb 15.4 oz)   LMP 03/11/2021   SpO2 100%   BMI 19.90 kg/m²      Neck:      Vascular: No carotid bruit or JVD.   Cardiovascular:      Rate and Rhythm: Normal rate and regular rhythm.      Pulses: Intact distal pulses.      Heart sounds: Murmur heard.      High-pitched blowing holosystolic murmur is present with a grade of 2/6 at the apex. Starts shortly after S1 suggesting mitral valve prolapse.      No friction rub. No gallop.   Pulmonary:      Effort: Pulmonary effort is normal.      Breath sounds: Normal breath sounds. No wheezing or rales.   Abdominal:      General: Bowel sounds are normal. There is no abdominal bruit.      Palpations: Abdomen is soft. There is no hepatomegaly.      Tenderness: There is no abdominal tenderness.   Musculoskeletal:      Cervical back: Neck supple.      Right lower leg: No edema.      Left lower leg: No edema. "   Skin:     Nails: There is no clubbing.   Neurological:      Mental Status: She is alert and oriented to person, place, and time.           Lab Results   Component Value Date     10/20/2023    K 3.9 10/20/2023    BUN 17 10/20/2023    CREATININE 1.0 10/20/2023    EGFRNORACEVR >60.0 10/20/2023    GLU 84 10/20/2023    HGBA1C 5.2 12/26/2022    CHOL 109 (L) 08/18/2023    TRIG 55 08/18/2023    HDL 40 08/18/2023    LDLCALC 58.0 (L) 08/18/2023     (H) 01/22/2023    HGB 15.2 04/26/2023    HCT 46.7 04/26/2023    WBC 6.86 04/26/2023     (L) 04/26/2023       Assessment:     1. History of ST elevation myocardial infarction (STEMI)    2. Atherosclerosis of native coronary artery of native heart without angina pectoris    3. Essential hypertension    4. Hyperlipidemia, unspecified hyperlipidemia type    5. Raynaud's phenomenon without gangrene    6. S/P primary angioplasty with coronary stent      The patient does not classic symptoms for cardiac ischemia heart failure or heart easily fatigued and has episodes shortness of breath when she is standing still.  On physical exam she has a new murmur compatible with mitral valve regurgitation due to a prolapsed leaflet.  The patient has been profile a year ago shows her LDL cholesterol well controlled.  However the patient needs a repeat lipid profile.  Patient should also have her A1c rechecked.  Patient's blood pressure is adequately controlled.  As it is more than a year since the patient's stent was placed, she will be switched to single antiplatelet therapy.  As she has shown previously that she response to clopidogrel, aspirin will be discontinued and clopidogrel continued.    Unless there are intervening problems, patient should return for re-evaluation in 1 year.       Plan:     Diagnoses and all orders for this visit:    History of ST elevation myocardial infarction (STEMI)    Atherosclerosis of native coronary artery of native heart without angina  pectoris    Essential hypertension    Hyperlipidemia, unspecified hyperlipidemia type    Raynaud's phenomenon without gangrene    S/P primary angioplasty with coronary stent          Agustin Helms MD  Consultative Cardiology

## 2024-10-21 NOTE — PATIENT INSTRUCTIONS
You need to get your cholesterol checked after a 12 hour fast.   No food or drink other than water, and any medication that needs to be taken, for 12 hours prior to the blood test.  You can eat as soon as you want after the blood sample is taken.

## 2024-11-04 ENCOUNTER — HOSPITAL ENCOUNTER (OUTPATIENT)
Dept: CARDIOLOGY | Facility: HOSPITAL | Age: 64
Discharge: HOME | End: 2024-11-04
Attending: INTERNAL MEDICINE
Payer: COMMERCIAL

## 2024-11-04 VITALS
WEIGHT: 115 LBS | SYSTOLIC BLOOD PRESSURE: 120 MMHG | DIASTOLIC BLOOD PRESSURE: 65 MMHG | HEART RATE: 50 BPM | BODY MASS INDEX: 19.63 KG/M2 | HEIGHT: 64 IN

## 2024-11-04 DIAGNOSIS — R01.1 HEART MURMUR, SYSTOLIC: ICD-10-CM

## 2024-11-04 LAB
ASCENDING AORTA: 2.91 CM
AV AREA BY CONTINUOUS VTI: 2.3 CM2
AV INDEX (PROSTH): 0.75
AV LVOT MEAN GRADIENT: 1 MMHG
AV LVOT PEAK GRADIENT: 3 MMHG
AV MEAN GRADIENT: 2.2 MMHG
AV PEAK GRADIENT: 4.8 MMHG
AV VALVE AREA BY VELOCITY RATIO: 2.6 CM²
AV VALVE AREA: 2.3 CM2
AV VELOCITY RATIO: 0.82
BSA FOR ECHO PROCEDURE: 1.53 M2
CV ECHO LV RWT: 0.44 CM
DOP CALC AO PEAK VEL: 1.1 M/S
DOP CALC AO VTI: 27.5 CM
DOP CALC LVOT AREA: 3.1 CM2
DOP CALC LVOT DIAMETER: 2 CM
DOP CALC LVOT PEAK VEL: 0.9 M/S
DOP CALC LVOT STROKE VOLUME: 64.4 CM3
DOP CALCLVOT PEAK VEL VTI: 20.5 CM
E WAVE DECELERATION TIME: 119.74 MS
E/A RATIO: 2.32
E/E' RATIO: 9.56 M/S
ECHO EF ESTIMATED: 63 %
ECHO LV POSTERIOR WALL: 0.8 CM (ref 0.6–1.1)
FRACTIONAL SHORTENING: 33.3 % (ref 28–44)
INTERVENTRICULAR SEPTUM: 0.7 CM (ref 0.6–1.1)
IVC DIAMETER: 1.31 CM
IVRT: 98.95 MS
LA MAJOR: 3.94 CM
LA MINOR: 4.13 CM
LA WIDTH: 3.27 CM
LEFT ATRIUM SIZE: 2.39 CM
LEFT ATRIUM VOLUME INDEX MOD: 26.9 ML/M2
LEFT ATRIUM VOLUME INDEX: 17.3 ML/M2
LEFT ATRIUM VOLUME MOD: 41.62 ML
LEFT ATRIUM VOLUME: 26.79 CM3
LEFT INTERNAL DIMENSION IN SYSTOLE: 2.4 CM (ref 2.1–4)
LEFT VENTRICLE DIASTOLIC VOLUME INDEX: 34.26 ML/M2
LEFT VENTRICLE DIASTOLIC VOLUME: 53.1 ML
LEFT VENTRICLE MASS INDEX: 46.5 G/M2
LEFT VENTRICLE SYSTOLIC VOLUME INDEX: 12.5 ML/M2
LEFT VENTRICLE SYSTOLIC VOLUME: 19.39 ML
LEFT VENTRICULAR INTERNAL DIMENSION IN DIASTOLE: 3.6 CM (ref 3.5–6)
LEFT VENTRICULAR MASS: 72.1 G
LV LATERAL E/E' RATIO: 7.82
LV SEPTAL E/E' RATIO: 12.29
MV A" WAVE DURATION": 188.39 MS
MV PEAK A VEL: 0.37 M/S
MV PEAK E VEL: 0.86 M/S
OHS CV RV/LV RATIO: 0.67 CM
PISA TR MAX VEL: 2.26 M/S
PULM VEIN A" WAVE DURATION": 188.39 MS
PULM VEIN S/D RATIO: 0.84
PULMONIC VEIN PEAK A VELOCITY: 0.4 M/S
PV PEAK D VEL: 0.69 M/S
PV PEAK S VEL: 0.58 M/S
RA MAJOR: 3.5 CM
RA PRESSURE ESTIMATED: 3 MMHG
RA WIDTH: 2.58 CM
RIGHT ATRIAL AREA: 8 CM2
RIGHT VENTRICLE DIASTOLIC BASEL DIMENSION: 2.4 CM
RV TB RVSP: 5 MMHG
SINUS: 3.43 CM
STJ: 3 CM
TDI LATERAL: 0.11 M/S
TDI SEPTAL: 0.07 M/S
TDI: 0.09 M/S
TR MAX PG: 20 MMHG
TV PEAK GRADIENT: 20 MMHG
TV REST PULMONARY ARTERY PRESSURE: 23 MMHG
Z-SCORE OF LEFT VENTRICULAR DIMENSION IN END DIASTOLE: -2.2
Z-SCORE OF LEFT VENTRICULAR DIMENSION IN END SYSTOLE: -1.17

## 2024-11-04 PROCEDURE — 93306 TTE W/DOPPLER COMPLETE: CPT | Mod: 26,,, | Performed by: INTERNAL MEDICINE

## 2024-11-04 PROCEDURE — 93306 TTE W/DOPPLER COMPLETE: CPT

## 2024-12-05 RX ORDER — SPIRONOLACTONE 25 MG/1
25 TABLET ORAL DAILY
Qty: 90 TABLET | Refills: 3 | Status: SHIPPED | OUTPATIENT
Start: 2024-12-05

## 2025-04-03 NOTE — TELEPHONE ENCOUNTER
Please see the attached refill request.  
Refill Routing Note   Medication(s) are not appropriate for processing by [facility] for the following reason(s):        Required labs outdated    [facility] action(s):  Defer             Appointments  past 12m or future 3m with PCP    Date Provider   Last Visit   Visit date not found Bonilla Riggins MD   Next Visit   Visit date not found Bonilla Riggins MD   ED visits in past 90 days: 0        Note composed:5:57 PM 04/02/2025                
Home

## 2025-04-04 ENCOUNTER — PATIENT MESSAGE (OUTPATIENT)
Dept: CARDIOLOGY | Facility: CLINIC | Age: 65
End: 2025-04-04
Payer: COMMERCIAL

## 2025-04-04 RX ORDER — CLOPIDOGREL BISULFATE 75 MG/1
75 TABLET ORAL
Qty: 30 TABLET | Refills: 11 | Status: SHIPPED | OUTPATIENT
Start: 2025-04-04

## 2025-07-16 DIAGNOSIS — E78.2 MIXED HYPERLIPIDEMIA: ICD-10-CM

## 2025-07-16 RX ORDER — ROSUVASTATIN CALCIUM 40 MG/1
40 TABLET, COATED ORAL
Qty: 90 TABLET | Refills: 0 | Status: SHIPPED | OUTPATIENT
Start: 2025-07-16

## 2025-07-16 NOTE — TELEPHONE ENCOUNTER
Refill Routing Note   Medication(s) are not appropriate for processing by [facility] for the following reason(s):        Required labs outdated    [facility] action(s):  Defer             Appointments  past 12m or future 3m with PCP    Date Provider   Last Visit   10/21/2024 Agustin Helms MD   Next Visit   Visit date not found Agustin Helms MD   ED visits in past 90 days: 0        Note composed:11:26 AM 07/16/2025

## 2025-09-04 DIAGNOSIS — I50.20 HFREF (HEART FAILURE WITH REDUCED EJECTION FRACTION): ICD-10-CM

## 2025-09-05 RX ORDER — VALSARTAN 40 MG/1
40 TABLET ORAL
Qty: 90 TABLET | Refills: 0 | Status: SHIPPED | OUTPATIENT
Start: 2025-09-05

## (undated) DEVICE — DEVICE PERCLOSE SUT CLSR 6FR

## (undated) DEVICE — SPONGE SUPER KERLIX 6X6.75IN

## (undated) DEVICE — SPONGE COTTON TRAY 4X4IN

## (undated) DEVICE — SET EXTENSION 30 IN W/LL ROLLE

## (undated) DEVICE — STENT ONYXNG35018UX ONYX 3.50X18RX
Type: IMPLANTABLE DEVICE | Site: HEART | Status: NON-FUNCTIONAL
Brand: ONYX FRONTIER™

## (undated) DEVICE — KIT CUSTOM MANIFOLD

## (undated) DEVICE — BANDAGE ELASTIC 2X5 VELCRO ST

## (undated) DEVICE — GUIDE WIRE BMW 014 X190

## (undated) DEVICE — TRANSDUCER ADULT DISP

## (undated) DEVICE — KIT COPILOT VALVE HEMO TOOL

## (undated) DEVICE — TAPE ZONAS POROUS 2X10YD/RL

## (undated) DEVICE — INFLATOR ENCORE 26 BLLN INFL

## (undated) DEVICE — GLOVE BIOGEL SKINSENSE PI 6.5

## (undated) DEVICE — CLOSURE SKIN STERI STRIP 1/2X4

## (undated) DEVICE — SPIKE CONTRAST CONTROLLER

## (undated) DEVICE — SHEATH PINNACLE 8FR

## (undated) DEVICE — BANDAGE SOFFORM STER 2IN

## (undated) DEVICE — KIT MICROINTRO 4F .018X40X7CM

## (undated) DEVICE — SUT ETHILON 5-0 P3 18IN BLK

## (undated) DEVICE — SYR 50CC LL

## (undated) DEVICE — SPONGE GAUZE 16PLY 4X4

## (undated) DEVICE — OMNIPAQUE 350 200ML

## (undated) DEVICE — GUIDE LAUNCHER 6FR JR 4.0

## (undated) DEVICE — Device

## (undated) DEVICE — CATH INFINITI 4F JL4 .042X100

## (undated) DEVICE — CATH EMERGE MR 20 X 2.00

## (undated) DEVICE — CORD BIPOLAR 12 FOOT

## (undated) DEVICE — SHEATH INTRODUCER 4FR 11CM

## (undated) DEVICE — HOLDER CATH IAB ADH STATLOCK

## (undated) DEVICE — TOURNIQUET SB QC DP 18X4IN

## (undated) DEVICE — NDL SAFETY HYPO BVL 22G 1IN

## (undated) DEVICE — PROTECTION STATION PLUS

## (undated) DEVICE — CHLORAPREP W TINT 26ML APPL

## (undated) DEVICE — SCRUB HIBICLENS 4% CHG 4OZ

## (undated) DEVICE — SUT VICRYL 3-0 27 SH

## (undated) DEVICE — SHEATH INTRODUCER 6FR 11CM

## (undated) DEVICE — ALCOHOL ISOPROPYL BLU 70% 16OZ

## (undated) DEVICE — BUCKET PLASTER DISPOSABLE

## (undated) DEVICE — DRESSING N ADH OIL EMUL 3X3

## (undated) DEVICE — GUIDEWIRE SAFE T .035IN 180CM

## (undated) DEVICE — STOCKINETTE 2IN ORTHO

## (undated) DEVICE — SYR B-D DISP CONTROL 10CC100/C

## (undated) DEVICE — SEE MEDLINE ITEM 157173

## (undated) DEVICE — SOL 9P NACL IRR PIC IL

## (undated) DEVICE — SPLINT PLASTER FAST SET 5X30IN

## (undated) DEVICE — SUT ETHILON 5-0 PS-2 18IN

## (undated) DEVICE — KIT CATH INSERT LINEAR 7.5F

## (undated) DEVICE — TRAY CATH LAB OMC

## (undated) DEVICE — PAD UNDERPAD 30X30

## (undated) DEVICE — ELECTRODE REM PLYHSV RETURN 9